# Patient Record
Sex: FEMALE | Race: WHITE | Employment: OTHER | URBAN - NONMETROPOLITAN AREA
[De-identification: names, ages, dates, MRNs, and addresses within clinical notes are randomized per-mention and may not be internally consistent; named-entity substitution may affect disease eponyms.]

---

## 2018-12-27 ENCOUNTER — PROCEDURE VISIT (OUTPATIENT)
Dept: NEUROLOGY | Age: 68
End: 2018-12-27
Payer: MEDICARE

## 2018-12-27 DIAGNOSIS — M54.89 BACK PAIN WITHOUT SCIATICA: ICD-10-CM

## 2018-12-27 DIAGNOSIS — R20.0 BILATERAL LEG NUMBNESS: Primary | ICD-10-CM

## 2018-12-27 DIAGNOSIS — G62.9 NEUROPATHY: ICD-10-CM

## 2018-12-27 PROCEDURE — 95910 NRV CNDJ TEST 7-8 STUDIES: CPT | Performed by: PSYCHIATRY & NEUROLOGY

## 2018-12-27 PROCEDURE — 95886 MUSC TEST DONE W/N TEST COMP: CPT | Performed by: PSYCHIATRY & NEUROLOGY

## 2019-03-18 ENCOUNTER — HOSPITAL ENCOUNTER (EMERGENCY)
Age: 69
Discharge: HOME | End: 2019-03-18
Attending: NURSE PRACTITIONER
Payer: MEDICARE

## 2019-03-18 VITALS
DIASTOLIC BLOOD PRESSURE: 97 MMHG | HEIGHT: 64 IN | RESPIRATION RATE: 20 BRPM | BODY MASS INDEX: 24.41 KG/M2 | TEMPERATURE: 99.4 F | OXYGEN SATURATION: 97 % | HEART RATE: 114 BPM | WEIGHT: 143 LBS | SYSTOLIC BLOOD PRESSURE: 168 MMHG

## 2019-03-18 DIAGNOSIS — J20.9 ACUTE BRONCHITIS, UNSPECIFIED ORGANISM: Primary | ICD-10-CM

## 2019-03-18 PROCEDURE — 99212 OFFICE O/P EST SF 10 MIN: CPT

## 2019-03-18 PROCEDURE — 99202 OFFICE O/P NEW SF 15 MIN: CPT | Performed by: NURSE PRACTITIONER

## 2019-03-18 RX ORDER — PREDNISONE 20 MG/1
20 TABLET ORAL 2 TIMES DAILY
Qty: 10 TABLET | Refills: 0 | Status: SHIPPED | OUTPATIENT
Start: 2019-03-18 | End: 2019-03-23

## 2019-03-18 RX ORDER — GLIMEPIRIDE 2 MG/1
2 TABLET ORAL 2 TIMES DAILY
COMMUNITY
End: 2019-10-21 | Stop reason: SDUPTHER

## 2019-03-18 RX ORDER — CEFDINIR 300 MG/1
300 CAPSULE ORAL 2 TIMES DAILY
Qty: 14 CAPSULE | Refills: 0 | Status: SHIPPED | OUTPATIENT
Start: 2019-03-18 | End: 2019-03-25

## 2019-03-18 RX ORDER — LISINOPRIL 20 MG/1
20 TABLET ORAL DAILY
COMMUNITY
End: 2019-10-21 | Stop reason: SDUPTHER

## 2019-03-18 RX ORDER — RANITIDINE 150 MG/1
150 CAPSULE ORAL DAILY
COMMUNITY
End: 2019-10-21 | Stop reason: ALTCHOICE

## 2019-03-18 RX ORDER — DEXTROMETHORPHAN HYDROBROMIDE AND PROMETHAZINE HYDROCHLORIDE 15; 6.25 MG/5ML; MG/5ML
5 SYRUP ORAL 4 TIMES DAILY PRN
Qty: 120 ML | Refills: 0 | Status: SHIPPED | OUTPATIENT
Start: 2019-03-18 | End: 2019-03-25

## 2019-03-18 ASSESSMENT — ENCOUNTER SYMPTOMS
NAUSEA: 0
CHEST TIGHTNESS: 0
VOMITING: 0
SHORTNESS OF BREATH: 0
DIARRHEA: 0
ABDOMINAL PAIN: 0
SINUS PAIN: 0
COUGH: 1
SORE THROAT: 0
SINUS PRESSURE: 0
WHEEZING: 0

## 2019-03-18 ASSESSMENT — PAIN DESCRIPTION - ORIENTATION: ORIENTATION: MID

## 2019-03-18 ASSESSMENT — PAIN SCALES - GENERAL: PAINLEVEL_OUTOF10: 3

## 2019-03-18 ASSESSMENT — PAIN DESCRIPTION - DESCRIPTORS: DESCRIPTORS: ACHING

## 2019-03-18 ASSESSMENT — PAIN DESCRIPTION - LOCATION: LOCATION: STERNUM

## 2019-03-18 ASSESSMENT — PAIN DESCRIPTION - ONSET: ONSET: ON-GOING

## 2019-03-18 ASSESSMENT — PAIN - FUNCTIONAL ASSESSMENT: PAIN_FUNCTIONAL_ASSESSMENT: PREVENTS OR INTERFERES WITH ALL ACTIVE AND SOME PASSIVE ACTIVITIES

## 2019-03-18 ASSESSMENT — PAIN DESCRIPTION - FREQUENCY: FREQUENCY: INTERMITTENT

## 2019-03-18 ASSESSMENT — PAIN DESCRIPTION - PAIN TYPE: TYPE: ACUTE PAIN

## 2019-03-18 ASSESSMENT — PAIN DESCRIPTION - PROGRESSION: CLINICAL_PROGRESSION: NOT CHANGED

## 2019-08-20 ENCOUNTER — OFFICE VISIT (OUTPATIENT)
Dept: PHYSICAL MEDICINE AND REHAB | Age: 69
End: 2019-08-20
Payer: MEDICARE

## 2019-08-20 VITALS
WEIGHT: 147 LBS | DIASTOLIC BLOOD PRESSURE: 70 MMHG | HEIGHT: 64 IN | SYSTOLIC BLOOD PRESSURE: 124 MMHG | HEART RATE: 72 BPM | BODY MASS INDEX: 25.1 KG/M2

## 2019-08-20 DIAGNOSIS — M54.5 CHRONIC BILATERAL LOW BACK PAIN, WITH SCIATICA PRESENCE UNSPECIFIED: ICD-10-CM

## 2019-08-20 DIAGNOSIS — M79.7 FIBROMYALGIA: ICD-10-CM

## 2019-08-20 DIAGNOSIS — G62.9 NEUROPATHY: Primary | ICD-10-CM

## 2019-08-20 DIAGNOSIS — G89.29 CHRONIC BILATERAL LOW BACK PAIN, WITH SCIATICA PRESENCE UNSPECIFIED: ICD-10-CM

## 2019-08-20 PROCEDURE — 99205 OFFICE O/P NEW HI 60 MIN: CPT | Performed by: NURSE PRACTITIONER

## 2019-08-20 RX ORDER — GABAPENTIN 800 MG/1
800 TABLET ORAL 2 TIMES DAILY
Qty: 60 TABLET | Refills: 1 | Status: SHIPPED | OUTPATIENT
Start: 2019-08-20 | End: 2019-09-24 | Stop reason: DRUGHIGH

## 2019-08-20 ASSESSMENT — ENCOUNTER SYMPTOMS
CHEST TIGHTNESS: 0
ABDOMINAL PAIN: 0
COUGH: 0
VOMITING: 0
DIARRHEA: 0
NAUSEA: 0
CONSTIPATION: 0
WHEEZING: 0
BACK PAIN: 0
EYE PAIN: 0
RHINORRHEA: 0
SINUS PRESSURE: 0
PHOTOPHOBIA: 0
SORE THROAT: 0
SHORTNESS OF BREATH: 0
COLOR CHANGE: 0

## 2019-08-20 NOTE — PROGRESS NOTES
well-developed and well-nourished. No distress. HENT:   Head: Normocephalic and atraumatic. Right Ear: External ear normal.   Left Ear: External ear normal.   Nose: Nose normal.   Mouth/Throat: Oropharynx is clear and moist. No oropharyngeal exudate. Eyes: Pupils are equal, round, and reactive to light. Conjunctivae and EOM are normal. Right eye exhibits no discharge. Left eye exhibits no discharge. No scleral icterus. Neck: Normal range of motion and full passive range of motion without pain. Neck supple. No muscular tenderness present. No neck rigidity. No edema, no erythema and normal range of motion present. No thyromegaly present. Cardiovascular: Normal rate, regular rhythm, normal heart sounds and intact distal pulses. Exam reveals no gallop and no friction rub. No murmur heard. Pulmonary/Chest: Effort normal and breath sounds normal. No respiratory distress. She has no wheezes. She has no rales. She exhibits no tenderness. Abdominal: Soft. Bowel sounds are normal. She exhibits no distension. There is no tenderness. There is no rebound and no guarding. Musculoskeletal: She exhibits tenderness. Right shoulder: She exhibits tenderness and bony tenderness. Left shoulder: She exhibits tenderness and bony tenderness. Right hip: She exhibits tenderness and bony tenderness. Left hip: She exhibits tenderness and bony tenderness. Right knee: She exhibits bony tenderness. Tenderness found. Left knee: Tenderness found. Right ankle: Tenderness. Left ankle: Tenderness. Cervical back: She exhibits tenderness and bony tenderness. Thoracic back: She exhibits tenderness and bony tenderness. Lumbar back: She exhibits tenderness and bony tenderness. Right lower leg: She exhibits tenderness. Left lower leg: She exhibits tenderness. Right foot: There is tenderness. Left foot: There is tenderness.

## 2019-09-24 ENCOUNTER — OFFICE VISIT (OUTPATIENT)
Dept: PHYSICAL MEDICINE AND REHAB | Age: 69
End: 2019-09-24
Payer: MEDICARE

## 2019-09-24 VITALS
HEIGHT: 64 IN | DIASTOLIC BLOOD PRESSURE: 66 MMHG | SYSTOLIC BLOOD PRESSURE: 118 MMHG | WEIGHT: 145.5 LBS | HEART RATE: 68 BPM | BODY MASS INDEX: 24.84 KG/M2

## 2019-09-24 DIAGNOSIS — G62.9 NEUROPATHY: ICD-10-CM

## 2019-09-24 DIAGNOSIS — M79.7 FIBROMYALGIA: ICD-10-CM

## 2019-09-24 DIAGNOSIS — M54.5 CHRONIC BILATERAL LOW BACK PAIN, WITH SCIATICA PRESENCE UNSPECIFIED: ICD-10-CM

## 2019-09-24 DIAGNOSIS — G62.9 NEUROPATHY: Primary | ICD-10-CM

## 2019-09-24 DIAGNOSIS — G89.29 CHRONIC BILATERAL LOW BACK PAIN, WITH SCIATICA PRESENCE UNSPECIFIED: ICD-10-CM

## 2019-09-24 PROCEDURE — 99213 OFFICE O/P EST LOW 20 MIN: CPT | Performed by: NURSE PRACTITIONER

## 2019-09-24 ASSESSMENT — ENCOUNTER SYMPTOMS
NAUSEA: 0
SORE THROAT: 0
SINUS PRESSURE: 0
CONSTIPATION: 0
BACK PAIN: 1
VOMITING: 0
ABDOMINAL PAIN: 0
WHEEZING: 0
COUGH: 0
CHEST TIGHTNESS: 0
DIARRHEA: 0
SHORTNESS OF BREATH: 0
EYE PAIN: 0
PHOTOPHOBIA: 0
COLOR CHANGE: 0
RHINORRHEA: 0

## 2019-09-24 NOTE — PROGRESS NOTES
FINDINGS:     Normal lumbar lordosis.  There is no substantial scoliosis. Feliz Simmonds is a     normal alignment of the vertebrae.          There is multilevel endplate spondylosis of the lumbar vertebrae.  Normal     disc space heights.  There is no demonstrated fracture.  Mild facet     arthropathy at L4-L5 and L5-S1.          There is atherosclerotic calcification of the abdominal aorta without a     demonstrated aneurysm.     ___________________________________          IMPRESSION:     Mild spondylosis.  Mild facet arthropathy.          EMG did not show Lumbar radiculopathy      She denies any ER visits since last visit. Pain scale with out pain medications or at its worst is 8/10. Pain scale with pain medications or at its best is 3/10. LAST UDS   8/20/2019  + THC she uses medical marijuana. She uses a vape pin every hs and brownies couple times a month. The patientis allergic to codeine and sulfa antibiotics. Past Medical History  Diana Silva  has a past medical history of Anxiety, Arthritis, Fibromyalgia, GERD (gastroesophageal reflux disease), GERD (gastroesophageal reflux disease), Hyperlipidemia, Hypertension, Hypothyroidism, Neuropathy, Osteoporosis, Type 2 diabetes mellitus without complication ([address] and Type II or unspecified type diabetes mellitus without mention of complication, not stated as uncontrolled. Past Surgical History  The patient  has a past surgical history that includes Hysterectomy; cyst removal; Cholecystectomy; Appendectomy; bladder suspension; Colonoscopy (2010, 2013); Tubal ligation; Carpal tunnel release (Right); Bladder surgery; Upper gastrointestinal endoscopy (4215,4682); and Upper gastrointestinal endoscopy.     Family History  This patient's family history includes Arthritis in her mother; Cancer (age of onset: 67) in her sister; Diabetes in her paternal aunt; Esophageal Cancer (age of onset: 61) in her brother; Esophageal Cancer (age of onset: 72) in her brother; pallor. Psychiatric: She has a normal mood and affect. Her behavior is normal. Judgment and thought content normal. Her mood appears not anxious. Her affect is not angry, not blunt, not labile and not inappropriate. Her speech is not rapid and/or pressured, not delayed, not tangential and not slurred. She is not agitated, not aggressive, not hyperactive, not slowed, not withdrawn, not actively hallucinating and not combative. Thought content is not paranoid and not delusional. Cognition and memory are not impaired. She does not express impulsivity or inappropriate judgment. She does not exhibit a depressed mood. She expresses no homicidal and no suicidal ideation. She expresses no suicidal plans and no homicidal plans. She is communicative. She exhibits normal recent memory and normal remote memory. She is attentive. Nursing note and vitals reviewed. ROSA test-  Ye[place]ans test:-  [place] test: -     Assessment:     1. Neuropathy    2. Fibromyalgia    3. Chronic bilateral low back pain, with sciatica presence unspecified            Plan:      · OARRS reviewed. Current MED: 6  · Patient was not offered naloxone for home. · Discussed long term side effects of medications, tolerance, dependency and addiction. · Previous UDS reviewed  · UDS preformed today for compliance. · Patient told can not receive any pain medications from any other source. · No evidence of abuse, diversion or aberrant behavior.  Medications and/or procedures to improve function and quality of life- patient understanding with this and that may not be pain free   Discussed with patient about safe storage of medications at home   Discussed possible weaning of medication dosing dependent on treatment/procedure results.     Testing: reviewed EMG and Lumbar XR, refuses to go to PT for Lumbar MRI to see if has spinal stenosis   Procedures: none   Discussed with patient about risks with procedure including infection, reaction to

## 2019-09-30 ENCOUNTER — TELEPHONE (OUTPATIENT)
Dept: PHYSICAL MEDICINE AND REHAB | Age: 69
End: 2019-09-30

## 2019-10-21 ENCOUNTER — OFFICE VISIT (OUTPATIENT)
Dept: FAMILY MEDICINE CLINIC | Age: 69
End: 2019-10-21
Payer: MEDICARE

## 2019-10-21 VITALS
WEIGHT: 144 LBS | SYSTOLIC BLOOD PRESSURE: 118 MMHG | HEIGHT: 64 IN | HEART RATE: 64 BPM | DIASTOLIC BLOOD PRESSURE: 72 MMHG | BODY MASS INDEX: 24.59 KG/M2

## 2019-10-21 DIAGNOSIS — B96.89 ACUTE BACTERIAL SINUSITIS: ICD-10-CM

## 2019-10-21 DIAGNOSIS — I10 ESSENTIAL HYPERTENSION: ICD-10-CM

## 2019-10-21 DIAGNOSIS — E03.9 ACQUIRED HYPOTHYROIDISM: ICD-10-CM

## 2019-10-21 DIAGNOSIS — J01.90 ACUTE BACTERIAL SINUSITIS: ICD-10-CM

## 2019-10-21 DIAGNOSIS — E11.42 TYPE 2 DIABETES MELLITUS WITH DIABETIC POLYNEUROPATHY, WITHOUT LONG-TERM CURRENT USE OF INSULIN (HCC): Primary | Chronic | ICD-10-CM

## 2019-10-21 DIAGNOSIS — Z12.31 ENCOUNTER FOR SCREENING MAMMOGRAM FOR BREAST CANCER: ICD-10-CM

## 2019-10-21 DIAGNOSIS — E55.9 VITAMIN D DEFICIENCY: ICD-10-CM

## 2019-10-21 LAB — HBA1C MFR BLD: 6.5 %

## 2019-10-21 PROCEDURE — 99214 OFFICE O/P EST MOD 30 MIN: CPT | Performed by: FAMILY MEDICINE

## 2019-10-21 PROCEDURE — 83036 HEMOGLOBIN GLYCOSYLATED A1C: CPT | Performed by: FAMILY MEDICINE

## 2019-10-21 RX ORDER — ERGOCALCIFEROL 1.25 MG/1
50000 CAPSULE ORAL WEEKLY
Qty: 16 CAPSULE | Refills: 1 | Status: SHIPPED | OUTPATIENT
Start: 2019-10-21 | End: 2020-12-30

## 2019-10-21 RX ORDER — METOPROLOL SUCCINATE 50 MG/1
50 TABLET, EXTENDED RELEASE ORAL DAILY
COMMUNITY
End: 2019-10-21 | Stop reason: SDUPTHER

## 2019-10-21 RX ORDER — LISINOPRIL 20 MG/1
20 TABLET ORAL DAILY
Qty: 90 TABLET | Refills: 1 | Status: SHIPPED | OUTPATIENT
Start: 2019-10-21 | End: 2020-05-19 | Stop reason: SDUPTHER

## 2019-10-21 RX ORDER — ERGOCALCIFEROL 1.25 MG/1
50000 CAPSULE ORAL WEEKLY
COMMUNITY
End: 2019-10-21 | Stop reason: SDUPTHER

## 2019-10-21 RX ORDER — LEVOTHYROXINE SODIUM 0.05 MG/1
50 TABLET ORAL DAILY
Qty: 90 TABLET | Refills: 1 | Status: SHIPPED | OUTPATIENT
Start: 2019-10-21 | End: 2020-05-19 | Stop reason: SDUPTHER

## 2019-10-21 RX ORDER — GLIMEPIRIDE 2 MG/1
2 TABLET ORAL 2 TIMES DAILY
Qty: 180 TABLET | Refills: 1 | Status: SHIPPED | OUTPATIENT
Start: 2019-10-21 | End: 2020-04-20 | Stop reason: SDUPTHER

## 2019-10-21 RX ORDER — CLONAZEPAM 1 MG/1
1 TABLET ORAL 3 TIMES DAILY PRN
Qty: 270 TABLET | Refills: 1 | Status: CANCELLED | OUTPATIENT
Start: 2019-10-21 | End: 2020-01-21

## 2019-10-21 RX ORDER — GABAPENTIN 600 MG/1
600 TABLET ORAL 4 TIMES DAILY
Qty: 360 TABLET | Refills: 1 | Status: SHIPPED | OUTPATIENT
Start: 2019-10-21 | End: 2020-05-19 | Stop reason: SDUPTHER

## 2019-10-21 RX ORDER — AMOXICILLIN AND CLAVULANATE POTASSIUM 875; 125 MG/1; MG/1
1 TABLET, FILM COATED ORAL 2 TIMES DAILY
Qty: 20 TABLET | Refills: 0 | Status: SHIPPED | OUTPATIENT
Start: 2019-10-21 | End: 2019-10-31

## 2019-10-21 RX ORDER — GABAPENTIN 600 MG/1
600 TABLET ORAL 4 TIMES DAILY
COMMUNITY
End: 2019-10-21 | Stop reason: SDUPTHER

## 2019-10-21 RX ORDER — METOPROLOL SUCCINATE 50 MG/1
50 TABLET, EXTENDED RELEASE ORAL DAILY
Qty: 90 TABLET | Refills: 1 | Status: SHIPPED | OUTPATIENT
Start: 2019-10-21 | End: 2020-04-07 | Stop reason: SDUPTHER

## 2019-10-21 ASSESSMENT — PATIENT HEALTH QUESTIONNAIRE - PHQ9
SUM OF ALL RESPONSES TO PHQ9 QUESTIONS 1 & 2: 0
2. FEELING DOWN, DEPRESSED OR HOPELESS: 0
1. LITTLE INTEREST OR PLEASURE IN DOING THINGS: 0
SUM OF ALL RESPONSES TO PHQ QUESTIONS 1-9: 0
SUM OF ALL RESPONSES TO PHQ QUESTIONS 1-9: 0

## 2019-10-21 ASSESSMENT — ENCOUNTER SYMPTOMS
NAUSEA: 0
COUGH: 1
SHORTNESS OF BREATH: 0
VOMITING: 0
EYE REDNESS: 0
EYE DISCHARGE: 0
RHINORRHEA: 1
SORE THROAT: 1

## 2019-11-07 ENCOUNTER — TELEPHONE (OUTPATIENT)
Dept: FAMILY MEDICINE CLINIC | Age: 69
End: 2019-11-07

## 2019-11-07 LAB
A/G RATIO: 1.5 (ref 1.5–2.5)
ALBUMIN SERPL-MCNC: 4.1 GM/DL (ref 3.5–5)
ALP BLD-CCNC: 39 IU/L (ref 39–118)
ALT SERPL-CCNC: 11 IU/L (ref 10–40)
ANION GAP SERPL CALCULATED.3IONS-SCNC: 9 MMOL/L (ref 4–12)
AST SERPL-CCNC: 14 IU/L (ref 15–41)
BILIRUB SERPL-MCNC: 0.3 MG/DL (ref 0.2–1)
BUN BLDV-MCNC: 19 MG/DL (ref 7–20)
CALCIUM SERPL-MCNC: 9.4 MG/DL (ref 8.8–10.5)
CHLORIDE BLD-SCNC: 104 MEQ/L (ref 101–111)
CHOLESTEROL/HDL RELATIVE RISK: 4.8 (ref 4–4.4)
CHOLESTEROL: 184 MG/DL
CO2: 28 MEQ/L (ref 21–32)
CREAT SERPL-MCNC: 1.19 MG/DL (ref 0.6–1.3)
CREATININE CLEARANCE: 45
CREATININE, RANDOM URINE: 380.1 MG/DL
DIRECT-LDL / HDL RISK: 3.1
GLUCOSE: 97 MG/DL (ref 70–110)
HDLC SERPL-MCNC: 38 MG/DL
LDL CHOLESTEROL DIRECT: 121 MG/DL
MICROALBUMIN UR-MCNC: 19.1 MG/L
MICROALBUMIN/CREAT UR-RTO: 5 MG/GM (ref 0–30)
POTASSIUM SERPL-SCNC: 4.9 MEQ/L (ref 3.6–5)
SODIUM BLD-SCNC: 141 MEQ/L (ref 135–145)
TOTAL PROTEIN: 6.9 G/DL (ref 6.2–8)
TRIGL SERPL-MCNC: 262 MG/DL
TSH SERPL DL<=0.05 MIU/L-ACNC: 1.58 MCIU/ML (ref 0.49–4.67)
VITAMIN D 25-HYDROXY: 52.5 NG/ML (ref 30–100)
VLDLC SERPL CALC-MCNC: 52 MG/DL

## 2019-12-03 DIAGNOSIS — F41.9 ANXIETY: Primary | ICD-10-CM

## 2019-12-03 RX ORDER — CLONAZEPAM 1 MG/1
1 TABLET ORAL 3 TIMES DAILY PRN
Qty: 270 TABLET | Refills: 0 | Status: SHIPPED | OUTPATIENT
Start: 2019-12-03 | End: 2020-04-07 | Stop reason: SDUPTHER

## 2019-12-13 RX ORDER — GABAPENTIN 800 MG/1
800 TABLET ORAL 2 TIMES DAILY
Qty: 60 TABLET | Refills: 0 | OUTPATIENT
Start: 2019-12-13 | End: 2020-01-12

## 2020-03-18 RX ORDER — SITAGLIPTIN AND METFORMIN HYDROCHLORIDE 1000; 50 MG/1; MG/1
1 TABLET, FILM COATED ORAL 2 TIMES DAILY WITH MEALS
Qty: 180 TABLET | Refills: 1 | Status: SHIPPED | OUTPATIENT
Start: 2020-03-18 | End: 2020-05-19 | Stop reason: SDUPTHER

## 2020-03-18 RX ORDER — LANSOPRAZOLE 30 MG/1
30 CAPSULE, DELAYED RELEASE ORAL DAILY
Qty: 90 CAPSULE | Refills: 1 | Status: SHIPPED | OUTPATIENT
Start: 2020-03-18 | End: 2020-05-19 | Stop reason: SDUPTHER

## 2020-04-01 ENCOUNTER — TELEPHONE (OUTPATIENT)
Dept: FAMILY MEDICINE CLINIC | Age: 70
End: 2020-04-01

## 2020-04-01 NOTE — TELEPHONE ENCOUNTER
----- Message from Anh Valerio MD sent at 3/31/2020  4:42 PM EDT -----  Please contact patient to set up Medicare AWV via [employer]. me for her and her  Tunde Mark on 4/1/2020 if possible.

## 2020-04-07 RX ORDER — METOPROLOL SUCCINATE 50 MG/1
50 TABLET, EXTENDED RELEASE ORAL DAILY
Qty: 90 TABLET | Refills: 1 | Status: SHIPPED | OUTPATIENT
Start: 2020-04-07 | End: 2020-05-19 | Stop reason: SDUPTHER

## 2020-04-07 RX ORDER — CLONAZEPAM 1 MG/1
1 TABLET ORAL 3 TIMES DAILY PRN
Qty: 270 TABLET | Refills: 0 | Status: SHIPPED | OUTPATIENT
Start: 2020-04-07 | End: 2020-05-19 | Stop reason: SDUPTHER

## 2020-04-07 NOTE — TELEPHONE ENCOUNTER
Steven Heron called requesting a refill on the following medications:  Requested Prescriptions     Pending Prescriptions Disp Refills    clonazePAM (KLONOPIN) 1 MG tablet 270 tablet 0     Sig: Take 1 tablet by mouth 3 times daily as needed for Anxiety for up to 90 days.     metoprolol succinate (TOPROL XL) 50 MG extended release tablet 90 tablet 1     Sig: Take 1 tablet by mouth daily     Pharmacy verified:  [facility]      Date of last visit: 10-21-19  Date of next visit (if applicable): 5/24/2674 (rs from 04-20)

## 2020-04-20 RX ORDER — GLIMEPIRIDE 2 MG/1
2 TABLET ORAL 2 TIMES DAILY
Qty: 180 TABLET | Refills: 1 | Status: SHIPPED | OUTPATIENT
Start: 2020-04-20 | End: 2020-05-19 | Stop reason: SDUPTHER

## 2020-05-18 ENCOUNTER — NURSE TRIAGE (OUTPATIENT)
Dept: OTHER | Facility: CLINIC | Age: 70
End: 2020-05-18

## 2020-05-18 NOTE — PROGRESS NOTES
Palpations: Abdomen is soft. Tenderness: There is no abdominal tenderness. Skin:     General: Skin is warm and dry. Findings: No erythema or rash. Neurological:      Mental Status: She is alert and oriented to person, place, and time. Psychiatric:         Behavior: Behavior normal.       Assessment/Plan:     Xu Ríos was seen today for medicare awv, generalized body aches and cough. Diagnoses and all orders for this visit:    Generalized body aches  -     COVID-19 Ambulatory; Future  -     predniSONE (DELTASONE) 20 MG tablet; Take 2 tablets by mouth daily    Dry cough  -     COVID-19 Ambulatory; Future  -     predniSONE (DELTASONE) 20 MG tablet; Take 2 tablets by mouth daily    Type 2 diabetes mellitus with diabetic polyneuropathy, without long-term current use of insulin (HCC)  -     gabapentin (NEURONTIN) 600 MG tablet; Take 1 tablet by mouth 4 times daily for 152 days. One tablet in AM, one tablet at lunch time, 2 tablets at bedtime  -     glimepiride (AMARYL) 2 MG tablet; Take 1 tablet by mouth 2 times daily    Acquired hypothyroidism  -     levothyroxine (SYNTHROID) 50 MCG tablet; Take 1 tablet by mouth Daily    Essential hypertension  -     Blood pressure is controlled so will continue on current medication.   -     lisinopril (PRINIVIL;ZESTRIL) 20 MG tablet; Take 1 tablet by mouth daily  -     metoprolol succinate (TOPROL XL) 50 MG extended release tablet; Take 1 tablet by mouth daily    Anxiety/Tremors        -     Controlled substances monitoring: possible medication side effects, risk of tolerance and/or dependence, and alternative treatments discussed, no signs of potential drug abuse or diversion identified and OARRS report reviewed today- activity consistent with treatment plan. -     clonazePAM (KLONOPIN) 1 MG tablet; Take 1 tablet by mouth 3 times daily as needed for Anxiety for up to 90 days.     Return if symptoms worsen or fail to improve, for Medicare Annual Wellness Visit in 1 year.    Electronically signed by Cammie Martinez MD on 5/19/2020 at 12:34 PM

## 2020-05-18 NOTE — PROGRESS NOTES
[address] [place] [address] [address], [address], [place]  Phone:  [phone]  Fax:  [phone]        Medicare Annual Wellness Visit    Name: Emilia Davis Date: 2020   MRN: [id number] Sex: Female   Age: 79 y.o. Ethnicity: Non-/Non    : 1950 Race: Monique Trujillo is here for Medicare AWV; Generalized Body Aches (after doing yardwork); and Cough (dry, worse at night )    Screenings for behavioral, psychosocial and functional/safety risks, and cognitive dysfunction are all negative except as indicated below. These results, as well as other patient data from the [address] form, are documented in Flowsheets linked to this Encounter. Allergies   Allergen Reactions    Codeine     Sulfa Antibiotics        Prior to Visit Medications    Medication Sig Taking? Authorizing Provider   glimepiride (AMARYL) 2 MG tablet Take 1 tablet by mouth 2 times daily Yes Nils Clifford MD   clonazePAM (KLONOPIN) 1 MG tablet Take 1 tablet by mouth 3 times daily as needed for Anxiety for up to 90 days. Yes Nils Clifford MD   metoprolol succinate (TOPROL XL) 50 MG extended release tablet Take 1 tablet by mouth daily Yes Nils Clifford MD   sitaGLIPtan-metformin (JANUMET)  MG per tablet Take 1 tablet by mouth 2 times daily (with meals) Yes Nils Clifford MD   lansoprazole (PREVACID) 30 MG delayed release capsule Take 1 capsule by mouth daily Yes Nils Clifford MD   lisinopril (PRINIVIL;ZESTRIL) 20 MG tablet Take 1 tablet by mouth daily Yes Nils Clifford MD   levothyroxine (SYNTHROID) 50 MCG tablet Take 1 tablet by mouth Daily Yes Nils Clifford MD   vitamin D (ERGOCALCIFEROL) 60396 units CAPS capsule Take 1 capsule by mouth once a week  Patient not taking: Reported on 2020  Nils Clifford MD   gabapentin (NEURONTIN) 600 MG tablet Take 1 tablet by mouth 4 times daily for 152 days.  One tablet in AM, one today and where indicated follow up appointments were made and/or referrals ordered. Positive Risk Factor Screenings with Interventions:     Fall Risk:  Timed Up and Go Test > 12 seconds? (Complete if either Fall Risk answers are Yes): no  2 or more falls in past year?: (!) yes  Fall with injury in past year?: no  Fall Risk Interventions:    · Home safety tips provided    Personalized Preventive Plan   Current Health Maintenance Status    There is no immunization history on file for this patient. Health Maintenance   Topic Date Due    Hepatitis C screen  1950    DTaP/Tdap/Td vaccine (1 - Tdap) 03/20/1969    Shingles Vaccine (1 of 2) 03/20/2000    DEXA (modify frequency per FRAX score)  03/20/2005    Pneumococcal 65+ years Vaccine (1 of 1 - PPSV23) 03/20/2015    Diabetic foot exam  07/15/2016    Annual Wellness Visit (AWV)  06/23/2019    Flu vaccine (Season Ended) 09/01/2020    A1C test (Diabetic or Prediabetic)  10/21/2020    Diabetic retinal exam  10/23/2020    Diabetic microalbuminuria test  11/07/2020    Lipid screen  11/07/2020    Potassium monitoring  11/07/2020    Creatinine monitoring  11/07/2020    Breast cancer screen  11/07/2021    Colon cancer screen colonoscopy  04/30/2025    Hepatitis A vaccine  Aged Out    Hib vaccine  Aged Out    Meningococcal (ACWY) vaccine  Aged Out     Recommendations for [place] Due: see orders and patient instructions/AVS.    Recommended screening schedule for the next 5-10 years is provided to the patient in written form: see Patient Instructions/AVS.    Steffani Goltz was seen today for medicare awv, generalized body aches and cough. Diagnoses and all orders for this visit:    Encounter for Medicare annual wellness exam        -     Routine health maintenance screening was reviewed as above. Electronically signed by Mary Anne King MD on 5/19/2020.

## 2020-05-19 ENCOUNTER — HOSPITAL ENCOUNTER (OUTPATIENT)
Age: 70
Discharge: HOME | End: 2020-05-19
Payer: MEDICARE

## 2020-05-19 ENCOUNTER — OFFICE VISIT (OUTPATIENT)
Dept: FAMILY MEDICINE CLINIC | Age: 70
End: 2020-05-19
Payer: MEDICARE

## 2020-05-19 VITALS
RESPIRATION RATE: 14 BRPM | OXYGEN SATURATION: 98 % | TEMPERATURE: 96.7 F | HEART RATE: 68 BPM | HEIGHT: 64 IN | SYSTOLIC BLOOD PRESSURE: 122 MMHG | BODY MASS INDEX: 24.48 KG/M2 | WEIGHT: 143.4 LBS | DIASTOLIC BLOOD PRESSURE: 72 MMHG

## 2020-05-19 PROCEDURE — G0444 DEPRESSION SCREEN ANNUAL: HCPCS | Performed by: FAMILY MEDICINE

## 2020-05-19 PROCEDURE — U0003 INFECTIOUS AGENT DETECTION BY NUCLEIC ACID (DNA OR RNA); SEVERE ACUTE RESPIRATORY SYNDROME CORONAVIRUS 2 (SARS-COV-2) (CORONAVIRUS DISEASE [COVID-19]), AMPLIFIED PROBE TECHNIQUE, MAKING USE OF HIGH THROUGHPUT TECHNOLOGIES AS DESCRIBED BY CMS-2020-01-R: HCPCS

## 2020-05-19 PROCEDURE — G0439 PPPS, SUBSEQ VISIT: HCPCS | Performed by: FAMILY MEDICINE

## 2020-05-19 PROCEDURE — 99213 OFFICE O/P EST LOW 20 MIN: CPT | Performed by: FAMILY MEDICINE

## 2020-05-19 PROCEDURE — 99211 OFF/OP EST MAY X REQ PHY/QHP: CPT

## 2020-05-19 PROCEDURE — U0002 COVID-19 LAB TEST NON-CDC: HCPCS

## 2020-05-19 RX ORDER — GABAPENTIN 600 MG/1
600 TABLET ORAL 4 TIMES DAILY
Qty: 360 TABLET | Refills: 1 | Status: SHIPPED | OUTPATIENT
Start: 2020-05-19 | End: 2020-12-30

## 2020-05-19 RX ORDER — GLIMEPIRIDE 2 MG/1
2 TABLET ORAL 2 TIMES DAILY
Qty: 180 TABLET | Refills: 1 | Status: SHIPPED | OUTPATIENT
Start: 2020-05-19 | End: 2020-12-30 | Stop reason: ALTCHOICE

## 2020-05-19 RX ORDER — SITAGLIPTIN AND METFORMIN HYDROCHLORIDE 1000; 50 MG/1; MG/1
1 TABLET, FILM COATED ORAL 2 TIMES DAILY WITH MEALS
Qty: 180 TABLET | Refills: 1 | Status: SHIPPED | OUTPATIENT
Start: 2020-05-19 | End: 2020-12-07

## 2020-05-19 RX ORDER — CLONAZEPAM 1 MG/1
1 TABLET ORAL 3 TIMES DAILY PRN
Qty: 270 TABLET | Refills: 0 | Status: SHIPPED | OUTPATIENT
Start: 2020-05-19 | End: 2021-01-06 | Stop reason: SDUPTHER

## 2020-05-19 RX ORDER — METOPROLOL SUCCINATE 50 MG/1
50 TABLET, EXTENDED RELEASE ORAL DAILY
Qty: 90 TABLET | Refills: 1 | Status: SHIPPED | OUTPATIENT
Start: 2020-05-19 | End: 2020-12-17 | Stop reason: SDUPTHER

## 2020-05-19 RX ORDER — LEVOTHYROXINE SODIUM 0.05 MG/1
50 TABLET ORAL DAILY
Qty: 90 TABLET | Refills: 1 | Status: SHIPPED | OUTPATIENT
Start: 2020-05-19 | End: 2020-12-15

## 2020-05-19 RX ORDER — PREDNISONE 20 MG/1
40 TABLET ORAL DAILY
Qty: 10 TABLET | Refills: 0 | Status: SHIPPED | OUTPATIENT
Start: 2020-05-19 | End: 2020-12-30

## 2020-05-19 RX ORDER — LISINOPRIL 20 MG/1
20 TABLET ORAL DAILY
Qty: 90 TABLET | Refills: 1 | Status: SHIPPED | OUTPATIENT
Start: 2020-05-19 | End: 2020-12-17 | Stop reason: SDUPTHER

## 2020-05-19 RX ORDER — LANSOPRAZOLE 30 MG/1
30 CAPSULE, DELAYED RELEASE ORAL DAILY
Qty: 90 CAPSULE | Refills: 1 | Status: SHIPPED | OUTPATIENT
Start: 2020-05-19 | End: 2021-04-12 | Stop reason: SDUPTHER

## 2020-05-19 ASSESSMENT — ENCOUNTER SYMPTOMS
NAUSEA: 0
WHEEZING: 0
EYE DISCHARGE: 0
RHINORRHEA: 0
SHORTNESS OF BREATH: 1
CHEST TIGHTNESS: 0
VOMITING: 0
COUGH: 1
EYE REDNESS: 0
COLOR CHANGE: 0

## 2020-05-19 ASSESSMENT — LIFESTYLE VARIABLES
HOW OFTEN DURING THE LAST YEAR HAVE YOU FAILED TO DO WHAT WAS NORMALLY EXPECTED FROM YOU BECAUSE OF DRINKING: 0
HOW OFTEN DURING THE LAST YEAR HAVE YOU FOUND THAT YOU WERE NOT ABLE TO STOP DRINKING ONCE YOU HAD STARTED: 0
HAVE YOU OR SOMEONE ELSE BEEN INJURED AS A RESULT OF YOUR DRINKING: 0
HOW MANY STANDARD DRINKS CONTAINING ALCOHOL DO YOU HAVE ON A TYPICAL DAY: 0
HAS A RELATIVE, FRIEND, DOCTOR, OR ANOTHER HEALTH PROFESSIONAL EXPRESSED CONCERN ABOUT YOUR DRINKING OR SUGGESTED YOU CUT DOWN: 0
HOW OFTEN DO YOU HAVE A DRINK CONTAINING ALCOHOL: 1
HOW OFTEN DURING THE LAST YEAR HAVE YOU NEEDED AN ALCOHOLIC DRINK FIRST THING IN THE MORNING TO GET YOURSELF GOING AFTER A NIGHT OF HEAVY DRINKING: 0
AUDIT TOTAL SCORE: 1
HOW OFTEN DO YOU HAVE SIX OR MORE DRINKS ON ONE OCCASION: 0
HOW OFTEN DURING THE LAST YEAR HAVE YOU HAD A FEELING OF GUILT OR REMORSE AFTER DRINKING: 0
AUDIT-C TOTAL SCORE: 1
HOW OFTEN DURING THE LAST YEAR HAVE YOU BEEN UNABLE TO REMEMBER WHAT HAPPENED THE NIGHT BEFORE BECAUSE YOU HAD BEEN DRINKING: 0

## 2020-05-19 ASSESSMENT — PATIENT HEALTH QUESTIONNAIRE - PHQ9: SUM OF ALL RESPONSES TO PHQ QUESTIONS 1-9: 10

## 2020-05-19 NOTE — PROGRESS NOTES
Oropharyngeal covid test obtained without difficulty.;  Pt tolerated well and released without complaints.

## 2020-05-19 NOTE — PATIENT INSTRUCTIONS
Personalized Preventive Plan for Farhan Harvey - 5/19/2020  Medicare offers a range of preventive health benefits. Some of the tests and screenings are paid in full while other may be subject to a deductible, co-insurance, and/or copay. Some of these benefits include a comprehensive review of your medical history including lifestyle, illnesses that may run in your family, and various assessments and screenings as appropriate. After reviewing your medical record and screening and assessments performed today your provider may have ordered immunizations, labs, imaging, and/or referrals for you. A list of these orders (if applicable) as well as your Preventive Care list are included within your After Visit Summary for your review. Other Preventive Recommendations:    · A preventive eye exam performed by an eye specialist is recommended every 1-2 years to screen for glaucoma; cataracts, macular degeneration, and other eye disorders. · A preventive dental visit is recommended every 6 months. · Try to get at least 150 minutes of exercise per week or 10,000 steps per day on a pedometer . · Order or download the FREE \"Exercise & Physical Activity: Your Everyday Guide\" from [employer]. Call 7-572.878.7986 or search [employer] online. · You need 5863-1977 mg of calcium and 3814-8694 IU of vitamin D per day. It is possible to meet your calcium requirement with diet alone, but a vitamin D supplement is usually necessary to meet this goal.  · When exposed to the sun, use a sunscreen that protects against both UVA and UVB radiation with an SPF of 30 or greater. Reapply every 2 to 3 hours or after sweating, drying off with a towel, or swimming. · Always wear a seat belt when traveling in a car. Always wear a helmet when riding a bicycle or motorcycle.

## 2020-05-21 LAB — SARS-COV-2: NOT DETECTED

## 2020-05-22 ENCOUNTER — TELEPHONE (OUTPATIENT)
Dept: FAMILY MEDICINE CLINIC | Age: 70
End: 2020-05-22

## 2020-11-03 ENCOUNTER — NURSE ONLY (OUTPATIENT)
Dept: FAMILY MEDICINE CLINIC | Age: 70
End: 2020-11-03
Payer: MEDICARE

## 2020-11-03 PROCEDURE — 90694 VACC AIIV4 NO PRSRV 0.5ML IM: CPT | Performed by: FAMILY MEDICINE

## 2020-11-03 PROCEDURE — G0008 ADMIN INFLUENZA VIRUS VAC: HCPCS | Performed by: FAMILY MEDICINE

## 2020-12-07 RX ORDER — SITAGLIPTIN AND METFORMIN HYDROCHLORIDE 1000; 50 MG/1; MG/1
1 TABLET, FILM COATED ORAL 2 TIMES DAILY WITH MEALS
Qty: 60 TABLET | Refills: 0 | Status: SHIPPED | OUTPATIENT
Start: 2020-12-07 | Stop reason: SDUPTHER

## 2020-12-07 NOTE — TELEPHONE ENCOUNTER
Yan Cruz called requesting a refill on the following medications:  Requested Prescriptions     Pending Prescriptions Disp Refills    JANUMET  MG per tablet [Pharmacy Med Name: JANUMET 50-1,000 MG TABLET] 60 tablet 0     Sig: Take 1 tablet by mouth 2 times daily (with meals)       Date of last visit: 5/19/2020  Date of next visit (if applicable):N/A  Date of last refill: 05/19/2020  [facility]

## 2020-12-14 NOTE — TELEPHONE ENCOUNTER
Vaughn  called requesting a refill on the following medications:  Requested Prescriptions     Pending Prescriptions Disp Refills    levothyroxine (SYNTHROID) 50 MCG tablet [Pharmacy Med Name: LEVOTHYROXINE 50 MCG TABLET] 90 tablet 0     Sig: Take 1 tablet by mouth Daily       Date of last visit: 5/19/2020  Date of next visit (if applicable):Visit date not found  Date of last refill: 05/19/2020  Pharmacy Name: [facility] Thanks, Dola Lesch, LPN

## 2020-12-15 RX ORDER — LEVOTHYROXINE SODIUM 0.05 MG/1
50 TABLET ORAL DAILY
Qty: 90 TABLET | Refills: 0 | Status: SHIPPED | OUTPATIENT
Start: 2020-12-15 | End: 2021-03-19

## 2020-12-17 RX ORDER — LISINOPRIL 10 MG/1
1 TABLET ORAL DAILY
COMMUNITY
Start: 2020-11-23 | End: 2020-12-17 | Stop reason: SDUPTHER

## 2020-12-17 RX ORDER — METOPROLOL SUCCINATE 50 MG/1
50 TABLET, EXTENDED RELEASE ORAL DAILY
Qty: 30 TABLET | Refills: 0 | Status: SHIPPED | OUTPATIENT
Start: 2020-12-17 | End: 2021-01-06 | Stop reason: SDUPTHER

## 2020-12-17 RX ORDER — METOPROLOL SUCCINATE 25 MG/1
1 TABLET, EXTENDED RELEASE ORAL DAILY
COMMUNITY
Start: 2020-11-11 | End: 2020-12-17 | Stop reason: SDUPTHER

## 2020-12-17 RX ORDER — LISINOPRIL 20 MG/1
20 TABLET ORAL DAILY
Qty: 30 TABLET | Refills: 0 | Status: SHIPPED | OUTPATIENT
Start: 2020-12-17 | End: 2021-01-06 | Stop reason: SDUPTHER

## 2020-12-17 RX ORDER — AMIODARONE HYDROCHLORIDE 200 MG/1
1 TABLET ORAL DAILY
COMMUNITY
Start: 2020-11-11 | End: 2020-12-17 | Stop reason: SDUPTHER

## 2020-12-17 RX ORDER — AMIODARONE HYDROCHLORIDE 200 MG/1
200 TABLET ORAL DAILY
Qty: 30 TABLET | Refills: 0 | Status: SHIPPED | OUTPATIENT
Start: 2020-12-17 | End: 2021-01-06 | Stop reason: SDUPTHER

## 2020-12-28 RX ORDER — LISINOPRIL 20 MG/1
20 TABLET ORAL DAILY
Qty: 30 TABLET | Refills: 0 | OUTPATIENT
Start: 2020-12-28

## 2020-12-28 RX ORDER — LANSOPRAZOLE 30 MG/1
30 CAPSULE, DELAYED RELEASE ORAL DAILY
Qty: 90 CAPSULE | Refills: 1 | OUTPATIENT
Start: 2020-12-28

## 2020-12-28 RX ORDER — AMIODARONE HYDROCHLORIDE 200 MG/1
200 TABLET ORAL DAILY
Qty: 30 TABLET | Refills: 0 | OUTPATIENT
Start: 2020-12-28

## 2020-12-28 RX ORDER — CLONAZEPAM 1 MG/1
1 TABLET ORAL 3 TIMES DAILY PRN
Qty: 270 TABLET | Refills: 0 | OUTPATIENT
Start: 2020-12-28 | End: 2021-03-28

## 2020-12-28 RX ORDER — GABAPENTIN 600 MG/1
600 TABLET ORAL 4 TIMES DAILY
Qty: 360 TABLET | Refills: 1 | OUTPATIENT
Start: 2020-12-28 | End: 2021-05-29

## 2020-12-28 RX ORDER — SITAGLIPTIN AND METFORMIN HYDROCHLORIDE 1000; 50 MG/1; MG/1
1 TABLET, FILM COATED ORAL 2 TIMES DAILY WITH MEALS
Qty: 60 TABLET | Refills: 0 | OUTPATIENT
Start: 2020-12-28

## 2020-12-28 ASSESSMENT — ENCOUNTER SYMPTOMS
EYE DISCHARGE: 0
NAUSEA: 0
EYE REDNESS: 0
VOMITING: 0
RHINORRHEA: 0

## 2020-12-28 NOTE — PROGRESS NOTES
[address] [place] [address] Rd, [address]  Phone:  [phone]  Fax:  [phone]         Name: Delmy Shipley  : 1950         Chief Complaint:     Chief Complaint   Patient presents with    6 Month Follow-Up     recently had pacemaker insertion  by Dr Farooq Mclaughlin Diabetes       History of Present Illness:     Eric Robertson is a 78 yo female who presents today for follow-up of T2DM, hypertension, hypothyroidism, and anxiety. She had a pacemaker placed by Dr. Yahir Teresa in September after which she's had a collapsed lung. Since that time she's had left lower chest wall/rib pain. She's had CXRs which were unremarkable. She did have sudden left-sided hearing loss and disequilibrium for which she's following with Dr. Jena Hunt at [facility]. She will be going for an MRI brain tomorrow for further evaluation. Diabetes  She has type 2 diabetes mellitus. The initial diagnosis of diabetes was made 18 years ago. Hypoglycemia symptoms include dizziness. Pertinent negatives for diabetes include no chest pain and no weakness. Risk factors for coronary artery disease include hypertension and diabetes mellitus. Current diabetic treatment includes oral agent (monotherapy). She is compliant with treatment all of the time. Her weight is stable. She is following a generally healthy diet. Hypertension  This is a chronic problem. The current episode started more than 1 year ago. Pertinent negatives include no chest pain or palpitations. Hypothyroidism  Recent symptoms: anxiety. She denies weight gain and weight loss. Patient is  taking her medication consistently on an empty stomach.     Lab Results   Component Value Date    TSHREFLEX 1.396 2020     Lab Results   Component Value Date    TSH 1.581 2019    TSH 2.380 2016    TSH 1.930 2015     Health Maintenance  Last mammogram:  At [facility]  Last colon CA screening:  With Dr. Marie Guzman  Last pneumococcal vaccine:  Never; declines      PastMedical History:     Past Medical History:   Diagnosis Date    Anxiety     Arthritis     Atrial fibrillation (HCC)     Fibromyalgia     GERD (gastroesophageal reflux disease)     Hyperlipidemia     Hypertension     Hypothyroidism     Neuropathy     arms and legs    Osteoporosis     Type 2 diabetes mellitus without complication (HCC)         Past Surgical History:     Past Surgical History:   Procedure Laterality Date    APPENDECTOMY      BLADDER SURGERY      nerve block placed per pt x 3    BLADDER SUSPENSION      CARPAL TUNNEL RELEASE Right     CHOLECYSTECTOMY      COLONOSCOPY  ,     CYST REMOVAL      HYSTERECTOMY      PACEMAKER INSERTION  2020    Dr Autumn Gomez UPPER GASTROINTESTINAL ENDOSCOPY  [id number]    UPPER GASTROINTESTINAL ENDOSCOPY          Family History:     Family History   Problem Relation Age of Onset    Arthritis Mother     Miscarriages / Stillbirths Mother     Lung Cancer Mother 80    Hearing Loss Father     Heart Disease Father     High Blood Pressure Father     Cancer Sister 67        tongue ca'    Esophageal Cancer Brother 61    Diabetes Paternal Aunt     Esophageal Cancer Brother 72    Esophageal Cancer Brother 79    Uterine Cancer Sister        Social History:     Social:    Social History     Socioeconomic History    Marital status:      Spouse name: Not on file    Number of children: Not on file    Years of education: Not on file    Highest education level: Not on file   Occupational History    Not on file   Social Needs    Financial resource strain: Not on file    Food insecurity     Worry: Not on file     Inability: Not on file    Transportation needs     Medical: Not on file     Non-medical: Not on file   Tobacco Use    Smoking status: Former Smoker     Packs/day: 0.25     Years: 4.00     Pack years: 1.00     Types: Cigarettes     Quit date: 1989     Years since quittin.0    90 days. (Patient taking differently: Take 1 mg by mouth 3 times daily as needed for Anxiety. ) 270 tablet 0    lansoprazole (PREVACID) 30 MG delayed release capsule Take 1 capsule by mouth daily 90 capsule 1     No current facility-administered medications for this visit. Review of Systems:      Review of Systems   Constitutional: Negative for fever. HENT: Positive for hearing loss. Negative for rhinorrhea. Eyes: Negative for discharge and redness. Cardiovascular: Negative for chest pain and palpitations. Gastrointestinal: Negative for nausea and vomiting. Genitourinary: Negative for dysuria and frequency. Musculoskeletal: Positive for arthralgias, back pain and myalgias. Allergic/Immunologic: Negative for environmental allergies and food allergies. Neurological: Positive for dizziness and numbness (bilateral feet). Negative for weakness. Psychiatric/Behavioral: Negative for decreased concentration and dysphoric mood. Physical Exam:     Vitals:  Blood pressure 128/68, pulse 64, temperature 97.1 °F (36.2 °C), height 5' 4\" (1.626 m), weight 142 lb (64.4 kg), SpO2 98 %. Physical Exam  Vitals signs and nursing note reviewed. Constitutional:       General: She is not in acute distress. Appearance: She is well-developed. HENT:      Head: Normocephalic and atraumatic. Nose: Nose normal.   Eyes:      Conjunctiva/sclera: Conjunctivae normal.   Neck:      Musculoskeletal: Normal range of motion and neck supple. Cardiovascular:      Rate and Rhythm: Normal rate and regular rhythm. Pulmonary:      Effort: Pulmonary effort is normal. No respiratory distress. Breath sounds: Normal breath sounds. No wheezing. Abdominal:      General: Bowel sounds are normal. There is no distension. Palpations: Abdomen is soft. Tenderness: There is no abdominal tenderness. Skin:     General: Skin is warm and dry. Findings: No erythema or rash.    Neurological:      Mental Status: She is alert and oriented to person, place, and time. Psychiatric:         Behavior: Behavior normal.       Assessment/Plan:     Yamilex Edmond was seen today for 6 month follow-up and diabetes. Diagnoses and all orders for this visit:    Type 2 diabetes mellitus with diabetic polyneuropathy, without long-term current use of insulin (HCC)  -     HbA1c has decreased from 6.5% to 5.5% so will discontinue Amaryl and continue on Janumet. A healthy low-carb diet and routine physical activity encouraged. -     POCT microalbumin  -     POCT glycosylated hemoglobin (Hb A1C)  -     Lipid Panel; Future  -     Comprehensive Metabolic Panel; Future    Essential hypertension        -     BP is controlled so will continue on current medications. Acquired hypothyroidism        -     Last TSH was WNL so will continue on current medication. Anxiety        -     Mood has been stable so will continue on Clonazpeam.    At high risk for falls        -     On the basis of positive falls risk screening, assessment and plan is as follows: home safety tips provided. Left-sided chest wall pain  -      Her left lower chest wall tenderness may still be from her pacemaker insertion/collapsed lung but will check imaging for further evaluation. -      CT CHEST WO CONTRAST;  Future      Electronically signed by Cheryle Kaiser, MD on 12/30/2020 at 9:53 AM

## 2020-12-30 ENCOUNTER — OFFICE VISIT (OUTPATIENT)
Dept: FAMILY MEDICINE CLINIC | Age: 70
End: 2020-12-30
Payer: MEDICARE

## 2020-12-30 VITALS
DIASTOLIC BLOOD PRESSURE: 68 MMHG | SYSTOLIC BLOOD PRESSURE: 128 MMHG | WEIGHT: 142 LBS | OXYGEN SATURATION: 98 % | HEIGHT: 64 IN | BODY MASS INDEX: 24.24 KG/M2 | TEMPERATURE: 97.1 F | HEART RATE: 64 BPM

## 2020-12-30 LAB
BILIRUBIN, POC: NEGATIVE
BLOOD URINE, POC: NEGATIVE
CLARITY, POC: CLEAR
COLOR, POC: YELLOW
CREATININE URINE POCT: 200
GLUCOSE URINE, POC: NEGATIVE
HBA1C MFR BLD: 5.5 %
KETONES, POC: NEGATIVE
LEUKOCYTE EST, POC: NEGATIVE
MICROALBUMIN/CREAT 24H UR: 10 MG/G{CREAT}
MICROALBUMIN/CREAT UR-RTO: 30
NITRITE, POC: POSITIVE
PH, POC: 5.5
PROTEIN, POC: NEGATIVE
SPECIFIC GRAVITY, POC: 1.03
UROBILINOGEN, POC: 0.2

## 2020-12-30 PROCEDURE — 83036 HEMOGLOBIN GLYCOSYLATED A1C: CPT | Performed by: FAMILY MEDICINE

## 2020-12-30 PROCEDURE — 81002 URINALYSIS NONAUTO W/O SCOPE: CPT | Performed by: FAMILY MEDICINE

## 2020-12-30 PROCEDURE — 99214 OFFICE O/P EST MOD 30 MIN: CPT | Performed by: FAMILY MEDICINE

## 2020-12-30 PROCEDURE — 82044 UR ALBUMIN SEMIQUANTITATIVE: CPT | Performed by: FAMILY MEDICINE

## 2020-12-30 RX ORDER — PROPAFENONE HYDROCHLORIDE 150 MG/1
150 TABLET, FILM COATED ORAL 2 TIMES DAILY
Status: ON HOLD | COMMUNITY
End: 2021-03-02

## 2020-12-30 RX ORDER — CEPHALEXIN 500 MG/1
500 CAPSULE ORAL 2 TIMES DAILY
Qty: 14 CAPSULE | Refills: 0 | Status: SHIPPED | OUTPATIENT
Start: 2020-12-30 | End: 2021-01-06

## 2020-12-30 ASSESSMENT — ENCOUNTER SYMPTOMS: BACK PAIN: 1

## 2020-12-31 LAB
A/G RATIO: 1.4 (ref 1.5–2.5)
ALBUMIN SERPL-MCNC: 4.2 GM/DL (ref 3.5–5)
ALP BLD-CCNC: 40 IU/L (ref 39–118)
ALT SERPL-CCNC: 11 IU/L (ref 10–40)
ANION GAP SERPL CALCULATED.3IONS-SCNC: 6 MMOL/L (ref 4–12)
AST SERPL-CCNC: 13 IU/L (ref 15–41)
BILIRUB SERPL-MCNC: 0.2 MG/DL (ref 0.2–1)
BUN BLDV-MCNC: 18 MG/DL (ref 7–20)
CALCIUM SERPL-MCNC: 9.1 MG/DL (ref 8.8–10.5)
CHLORIDE BLD-SCNC: 106 MEQ/L (ref 101–111)
CHOLESTEROL/HDL RELATIVE RISK: 5.1 (ref 4–4.4)
CHOLESTEROL: 246 MG/DL
CO2: 28 MEQ/L (ref 21–32)
CREAT SERPL-MCNC: 0.85 MG/DL (ref 0.6–1.3)
CREATININE CLEARANCE: >60
DIRECT-LDL / HDL RISK: 3.3
GLUCOSE: 89 MG/DL (ref 70–110)
HDLC SERPL-MCNC: 48 MG/DL
LDL CHOLESTEROL DIRECT: 159 MG/DL
POTASSIUM SERPL-SCNC: 4.8 MEQ/L (ref 3.6–5)
SODIUM BLD-SCNC: 140 MEQ/L (ref 135–145)
TOTAL PROTEIN: 7.2 G/DL (ref 6.2–8)
TRIGL SERPL-MCNC: 415 MG/DL
VLDLC SERPL CALC-MCNC: ABNORMAL MG/DL

## 2021-01-04 DIAGNOSIS — F41.9 ANXIETY: ICD-10-CM

## 2021-01-04 DIAGNOSIS — I10 ESSENTIAL HYPERTENSION: ICD-10-CM

## 2021-01-04 RX ORDER — LISINOPRIL 20 MG/1
20 TABLET ORAL DAILY
Qty: 30 TABLET | Refills: 0 | OUTPATIENT
Start: 2021-01-04

## 2021-01-04 RX ORDER — METOPROLOL SUCCINATE 50 MG/1
50 TABLET, EXTENDED RELEASE ORAL DAILY
Qty: 30 TABLET | Refills: 0 | OUTPATIENT
Start: 2021-01-04

## 2021-01-04 RX ORDER — AMIODARONE HYDROCHLORIDE 200 MG/1
200 TABLET ORAL DAILY
Qty: 30 TABLET | Refills: 0 | OUTPATIENT
Start: 2021-01-04

## 2021-01-04 RX ORDER — CLONAZEPAM 1 MG/1
1 TABLET ORAL 3 TIMES DAILY PRN
Qty: 270 TABLET | Refills: 0 | OUTPATIENT
Start: 2021-01-04 | End: 2021-04-04

## 2021-01-04 NOTE — TELEPHONE ENCOUNTER
Flaquito Palomino called requesting a refill on the following medications:  Requested Prescriptions     Pending Prescriptions Disp Refills    metoprolol succinate (TOPROL XL) 50 MG extended release tablet 30 tablet 0     Sig: Take 1 tablet by mouth daily    amiodarone (CORDARONE) 200 MG tablet 30 tablet 0     Sig: Take 1 tablet by mouth daily    clonazePAM (KLONOPIN) 1 MG tablet 270 tablet 0     Sig: Take 1 tablet by mouth 3 times daily as needed for Anxiety for up to 90 days.     lisinopril (PRINIVIL;ZESTRIL) 20 MG tablet 30 tablet 0     Sig: Take 1 tablet by mouth daily     Pharmacy verified:  [email or web address]      Date of last visit: 12/30/20  Date of next visit (if applicable): 9/97/8916

## 2021-01-04 NOTE — TELEPHONE ENCOUNTER
----- Message from Arabella Aleman MD sent at 12/31/2020  5:15 PM EST -----  Cholesterol is elevated so I recommend a healthy low fat diet, increased physical activity, and a statin.

## 2021-01-05 RX ORDER — SITAGLIPTIN AND METFORMIN HYDROCHLORIDE 1000; 50 MG/1; MG/1
1 TABLET, FILM COATED ORAL 2 TIMES DAILY WITH MEALS
Qty: 60 TABLET | Refills: 0 | Status: SHIPPED | OUTPATIENT
Start: 2021-01-05 | End: 2021-01-07

## 2021-01-06 DIAGNOSIS — F41.9 ANXIETY: ICD-10-CM

## 2021-01-06 DIAGNOSIS — I10 ESSENTIAL HYPERTENSION: ICD-10-CM

## 2021-01-06 RX ORDER — CLONAZEPAM 1 MG/1
1 TABLET ORAL 3 TIMES DAILY PRN
Qty: 270 TABLET | Refills: 0 | Status: SHIPPED | OUTPATIENT
Start: 2021-01-06 | End: 2021-04-12 | Stop reason: SDUPTHER

## 2021-01-06 RX ORDER — LISINOPRIL 20 MG/1
20 TABLET ORAL DAILY
Qty: 90 TABLET | Refills: 1 | Status: ON HOLD
Start: 2021-01-06 | End: 2021-03-03 | Stop reason: HOSPADM

## 2021-01-06 RX ORDER — AMIODARONE HYDROCHLORIDE 200 MG/1
200 TABLET ORAL DAILY
Qty: 90 TABLET | Refills: 1 | Status: SHIPPED | OUTPATIENT
Start: 2021-01-06

## 2021-01-06 RX ORDER — METOPROLOL SUCCINATE 50 MG/1
50 TABLET, EXTENDED RELEASE ORAL DAILY
Qty: 90 TABLET | Refills: 1 | Status: SHIPPED | OUTPATIENT
Start: 2021-01-06

## 2021-01-06 NOTE — TELEPHONE ENCOUNTER
Moon Robles called requesting a refill on the following medications:  Requested Prescriptions     Pending Prescriptions Disp Refills    clonazePAM (KLONOPIN) 1 MG tablet 270 tablet 0     Sig: Take 1 tablet by mouth 3 times daily as needed for Anxiety for up to 90 days.     lisinopril (PRINIVIL;ZESTRIL) 20 MG tablet 90 tablet 1     Sig: Take 1 tablet by mouth daily    metoprolol succinate (TOPROL XL) 50 MG extended release tablet 90 tablet 1     Sig: Take 1 tablet by mouth daily    amiodarone (CORDARONE) 200 MG tablet 90 tablet 1     Sig: Take 1 tablet by mouth daily       Date of last visit: 12/30/2020  Date of next visit (if applicable):6/28/2021  Date of last refill: Klonipin last filled 05/19/20 #270 x NR  Pharmacy Name: [facility] ([address] [place] [address]) Problem: Adult Inpatient Plan of Care  Goal: Plan of Care Review  Outcome: Ongoing (interventions implemented as appropriate)  Pt AAOx4, VSS.  HTN meds on board.  Denies pain, SOB, or n/v.  No complaints or issues noted.  Tolerating diet without issues.  Blood sugars checked ACHS.  Meal insulin given as scheduled along with SSI PRN.  No BM for the last few days.  PRN suppository given.  Good urine output.  Kidney function monitored on am labs.  Fall precautions in place.  Bed lowered and locked. Call bell in reach.  Non-skid socks on feet. Pt instructed to call for assistance when needed.   No acute events/falls/injuries this shift. See flowsheet for further assessment findings. Will monitor.

## 2021-01-07 RX ORDER — SITAGLIPTIN AND METFORMIN HYDROCHLORIDE 1000; 50 MG/1; MG/1
1 TABLET, FILM COATED ORAL 2 TIMES DAILY WITH MEALS
Qty: 60 TABLET | Refills: 0 | Status: SHIPPED | OUTPATIENT
Start: 2021-01-07

## 2021-01-11 ENCOUNTER — TELEPHONE (OUTPATIENT)
Dept: FAMILY MEDICINE CLINIC | Age: 71
End: 2021-01-11

## 2021-01-12 ENCOUNTER — TELEPHONE (OUTPATIENT)
Dept: FAMILY MEDICINE CLINIC | Age: 71
End: 2021-01-12

## 2021-01-12 NOTE — TELEPHONE ENCOUNTER
Patient phoned with 2 concerns:    1. Patient went to [facility] and they did not have Amiodarone Rx that was sent 01/06/21, it shows receipt confirmed on our end. Told Stefania Guerrero that I would check into it and see what they need from us. I spoke with Paula Hernandez @ [facility] who confirmed receipt of the other prescriptions but not the Amiodarone. I gave verbal order to Paula Hernandez and she will get it filled for the patient. 2.  Stefania Guerrero is concerned that with her CT being ok, she is still having the pain in her side and abdomen. Wanting to know what the next step should be to determine the cause of her discomfort.  Please advise

## 2021-01-12 NOTE — TELEPHONE ENCOUNTER
I am honestly unsure of the cause of her discomfort. Has she followed up with Dr. Enrico Friedman since her pacemaker insertion/collapsed lung?

## 2021-01-13 NOTE — TELEPHONE ENCOUNTER
Called and spoke with Ronnie Monterroso , she is upset that no source of her pain is being found. She did follow up with Dr. Treva Lyon and he says with her traumatic procedure it will take a while for the pain to subside. She says she can't afford to get 1 test at a time. Also she tells me she is looking for a new PCP because of the run a round with refilling her medications. Suggested she talk to  our  and she refused.

## 2021-01-25 ENCOUNTER — HOSPITAL ENCOUNTER (OUTPATIENT)
Dept: CT IMAGING | Age: 71
Discharge: HOME | End: 2021-01-25
Payer: MEDICARE

## 2021-01-25 DIAGNOSIS — R10.32 ABDOMINAL PAIN, LEFT LOWER QUADRANT: ICD-10-CM

## 2021-01-25 PROCEDURE — 74177 CT ABD & PELVIS W/CONTRAST: CPT

## 2021-01-25 PROCEDURE — 6360000004 HC RX CONTRAST MEDICATION: Performed by: NURSE PRACTITIONER

## 2021-01-25 RX ADMIN — IOHEXOL 50 ML: 240 INJECTION, SOLUTION INTRATHECAL; INTRAVASCULAR; INTRAVENOUS; ORAL at 10:10

## 2021-01-25 RX ADMIN — IOPAMIDOL 85 ML: 755 INJECTION, SOLUTION INTRAVENOUS at 10:10

## 2021-03-02 ENCOUNTER — HOSPITAL ENCOUNTER (OUTPATIENT)
Age: 71
Setting detail: OBSERVATION
Discharge: HOME | End: 2021-03-03
Attending: INTERNAL MEDICINE | Admitting: INTERNAL MEDICINE
Payer: MEDICARE

## 2021-03-02 ENCOUNTER — APPOINTMENT (OUTPATIENT)
Dept: CT IMAGING | Age: 71
End: 2021-03-02
Payer: MEDICARE

## 2021-03-02 DIAGNOSIS — R10.12 LEFT UPPER QUADRANT ABDOMINAL PAIN: Primary | ICD-10-CM

## 2021-03-02 DIAGNOSIS — R79.89 HIGH SERUM LACTIC ACID: ICD-10-CM

## 2021-03-02 DIAGNOSIS — R77.8 ELEVATED TROPONIN: ICD-10-CM

## 2021-03-02 DIAGNOSIS — E11.65 UNCONTROLLED TYPE 2 DIABETES MELLITUS WITH HYPERGLYCEMIA (HCC): ICD-10-CM

## 2021-03-02 PROBLEM — E87.20 LACTIC ACIDOSIS: Status: ACTIVE | Noted: 2021-03-02

## 2021-03-02 PROBLEM — R10.11 RUQ ABDOMINAL PAIN: Status: ACTIVE | Noted: 2021-03-02

## 2021-03-02 PROBLEM — N17.9 AKI (ACUTE KIDNEY INJURY) (HCC): Status: ACTIVE | Noted: 2021-03-02

## 2021-03-02 PROBLEM — E03.9 HYPOTHYROIDISM: Status: ACTIVE | Noted: 2021-03-02

## 2021-03-02 LAB
ALBUMIN SERPL-MCNC: 3.9 G/DL (ref 3.5–5.1)
ALP BLD-CCNC: 41 U/L (ref 38–126)
ALT SERPL-CCNC: 12 U/L (ref 11–66)
ANION GAP SERPL CALCULATED.3IONS-SCNC: 18 MEQ/L (ref 8–16)
AST SERPL-CCNC: 14 U/L (ref 5–40)
AVERAGE GLUCOSE: 111 MG/DL (ref 70–126)
BASOPHILS # BLD: 0.6 %
BASOPHILS ABSOLUTE: 0 THOU/MM3 (ref 0–0.1)
BILIRUB SERPL-MCNC: < 0.2 MG/DL (ref 0.3–1.2)
BILIRUBIN URINE: NEGATIVE
BLOOD, URINE: NEGATIVE
BUN BLDV-MCNC: 24 MG/DL (ref 7–22)
CALCIUM SERPL-MCNC: 9.1 MG/DL (ref 8.5–10.5)
CHARACTER, URINE: CLEAR
CHLORIDE BLD-SCNC: 102 MEQ/L (ref 98–111)
CO2: 20 MEQ/L (ref 23–33)
COLOR: YELLOW
CREAT SERPL-MCNC: 1.2 MG/DL (ref 0.4–1.2)
EKG ATRIAL RATE: 67 BPM
EKG P AXIS: 28 DEGREES
EKG P-R INTERVAL: 234 MS
EKG Q-T INTERVAL: 454 MS
EKG QRS DURATION: 78 MS
EKG QTC CALCULATION (BAZETT): 479 MS
EKG R AXIS: 39 DEGREES
EKG T AXIS: 63 DEGREES
EKG VENTRICULAR RATE: 67 BPM
EOSINOPHIL # BLD: 1.7 %
EOSINOPHILS ABSOLUTE: 0.1 THOU/MM3 (ref 0–0.4)
ERYTHROCYTE [DISTWIDTH] IN BLOOD BY AUTOMATED COUNT: 13.6 % (ref 11.5–14.5)
ERYTHROCYTE [DISTWIDTH] IN BLOOD BY AUTOMATED COUNT: 44.6 FL (ref 35–45)
GFR SERPL CREATININE-BSD FRML MDRD: 44 ML/MIN/1.73M2
GLUCOSE BLD-MCNC: 140 MG/DL (ref 70–108)
GLUCOSE BLD-MCNC: 154 MG/DL (ref 70–108)
GLUCOSE BLD-MCNC: 164 MG/DL (ref 70–108)
GLUCOSE BLD-MCNC: 241 MG/DL (ref 70–108)
GLUCOSE BLD-MCNC: 55 MG/DL (ref 70–108)
GLUCOSE BLD-MCNC: 68 MG/DL (ref 70–108)
GLUCOSE BLD-MCNC: 88 MG/DL (ref 70–108)
GLUCOSE URINE: NEGATIVE MG/DL
HBA1C MFR BLD: 5.7 % (ref 4.4–6.4)
HCT VFR BLD CALC: 38.1 % (ref 37–47)
HEMOGLOBIN: 12 GM/DL (ref 12–16)
IMMATURE GRANS (ABS): 0.02 THOU/MM3 (ref 0–0.07)
IMMATURE GRANULOCYTES: 0.2 %
KETONES, URINE: ABNORMAL
LACTIC ACID: 4.7 MMOL/L (ref 0.5–2.2)
LACTIC ACID: 7.8 MMOL/L (ref 0.5–2.2)
LEUKOCYTE ESTERASE, URINE: NEGATIVE
LIPASE: 47.2 U/L (ref 5.6–51.3)
LYMPHOCYTES # BLD: 29.6 %
LYMPHOCYTES ABSOLUTE: 2.4 THOU/MM3 (ref 1–4.8)
MCH RBC QN AUTO: 28.3 PG (ref 26–33)
MCHC RBC AUTO-ENTMCNC: 31.5 GM/DL (ref 32.2–35.5)
MCV RBC AUTO: 89.9 FL (ref 81–99)
MONOCYTES # BLD: 6.5 %
MONOCYTES ABSOLUTE: 0.5 THOU/MM3 (ref 0.4–1.3)
NITRITE, URINE: NEGATIVE
NUCLEATED RED BLOOD CELLS: 0 /100 WBC
OSMOLALITY CALCULATION: 291.4 MOSMOL/KG (ref 275–300)
PH UA: 5 (ref 5–9)
PLATELET # BLD: 221 THOU/MM3 (ref 130–400)
PMV BLD AUTO: 9.3 FL (ref 9.4–12.4)
POTASSIUM SERPL-SCNC: 4.2 MEQ/L (ref 3.5–5.2)
PROCALCITONIN: 0.07 NG/ML (ref 0.01–0.09)
PROTEIN UA: NEGATIVE
RBC # BLD: 4.24 MILL/MM3 (ref 4.2–5.4)
SEG NEUTROPHILS: 61.4 %
SEGMENTED NEUTROPHILS ABSOLUTE COUNT: 5 THOU/MM3 (ref 1.8–7.7)
SODIUM BLD-SCNC: 140 MEQ/L (ref 135–145)
SPECIFIC GRAVITY, URINE: 1.01 (ref 1–1.03)
TOTAL PROTEIN: 7.1 G/DL (ref 6.1–8)
TROPONIN T: 0.01 NG/ML
TROPONIN T: 0.01 NG/ML
TROPONIN T: < 0.01 NG/ML
TSH SERPL DL<=0.05 MIU/L-ACNC: 2.02 UIU/ML (ref 0.4–4.2)
UROBILINOGEN, URINE: 0.2 EU/DL (ref 0–1)
WBC # BLD: 8.1 THOU/MM3 (ref 4.8–10.8)

## 2021-03-02 PROCEDURE — 6370000000 HC RX 637 (ALT 250 FOR IP): Performed by: INTERNAL MEDICINE

## 2021-03-02 PROCEDURE — 83605 ASSAY OF LACTIC ACID: CPT

## 2021-03-02 PROCEDURE — 96375 TX/PRO/DX INJ NEW DRUG ADDON: CPT

## 2021-03-02 PROCEDURE — 99204 OFFICE O/P NEW MOD 45 MIN: CPT | Performed by: INTERNAL MEDICINE

## 2021-03-02 PROCEDURE — 6360000004 HC RX CONTRAST MEDICATION: Performed by: PHYSICIAN ASSISTANT

## 2021-03-02 PROCEDURE — 80053 COMPREHEN METABOLIC PANEL: CPT

## 2021-03-02 PROCEDURE — 96361 HYDRATE IV INFUSION ADD-ON: CPT

## 2021-03-02 PROCEDURE — 83690 ASSAY OF LIPASE: CPT

## 2021-03-02 PROCEDURE — 2580000003 HC RX 258

## 2021-03-02 PROCEDURE — 99285 EMERGENCY DEPT VISIT HI MDM: CPT

## 2021-03-02 PROCEDURE — 85025 COMPLETE CBC W/AUTO DIFF WBC: CPT

## 2021-03-02 PROCEDURE — 6360000002 HC RX W HCPCS: Performed by: PHYSICIAN ASSISTANT

## 2021-03-02 PROCEDURE — 2580000003 HC RX 258: Performed by: INTERNAL MEDICINE

## 2021-03-02 PROCEDURE — 81003 URINALYSIS AUTO W/O SCOPE: CPT

## 2021-03-02 PROCEDURE — 82948 REAGENT STRIP/BLOOD GLUCOSE: CPT

## 2021-03-02 PROCEDURE — 84145 PROCALCITONIN (PCT): CPT

## 2021-03-02 PROCEDURE — 84443 ASSAY THYROID STIM HORMONE: CPT

## 2021-03-02 PROCEDURE — 99220 PR INITIAL OBSERVATION CARE/DAY 70 MINUTES: CPT | Performed by: INTERNAL MEDICINE

## 2021-03-02 PROCEDURE — G0378 HOSPITAL OBSERVATION PER HR: HCPCS

## 2021-03-02 PROCEDURE — 96374 THER/PROPH/DIAG INJ IV PUSH: CPT

## 2021-03-02 PROCEDURE — 2580000003 HC RX 258: Performed by: PHYSICIAN ASSISTANT

## 2021-03-02 PROCEDURE — 74177 CT ABD & PELVIS W/CONTRAST: CPT

## 2021-03-02 PROCEDURE — 84484 ASSAY OF TROPONIN QUANT: CPT

## 2021-03-02 PROCEDURE — 83036 HEMOGLOBIN GLYCOSYLATED A1C: CPT

## 2021-03-02 PROCEDURE — 93005 ELECTROCARDIOGRAM TRACING: CPT | Performed by: PHYSICIAN ASSISTANT

## 2021-03-02 PROCEDURE — 36415 COLL VENOUS BLD VENIPUNCTURE: CPT

## 2021-03-02 RX ORDER — NICOTINE POLACRILEX 4 MG
15 LOZENGE BUCCAL PRN
Status: DISCONTINUED | OUTPATIENT
Start: 2021-03-02 | End: 2021-03-03 | Stop reason: HOSPADM

## 2021-03-02 RX ORDER — CLONAZEPAM 1 MG/1
1 TABLET ORAL 3 TIMES DAILY PRN
Status: DISCONTINUED | OUTPATIENT
Start: 2021-03-02 | End: 2021-03-03 | Stop reason: HOSPADM

## 2021-03-02 RX ORDER — PROMETHAZINE HYDROCHLORIDE 25 MG/1
12.5 TABLET ORAL EVERY 6 HOURS PRN
Status: DISCONTINUED | OUTPATIENT
Start: 2021-03-02 | End: 2021-03-03 | Stop reason: HOSPADM

## 2021-03-02 RX ORDER — SODIUM CHLORIDE 0.9 % (FLUSH) 0.9 %
10 SYRINGE (ML) INJECTION PRN
Status: DISCONTINUED | OUTPATIENT
Start: 2021-03-02 | End: 2021-03-03 | Stop reason: HOSPADM

## 2021-03-02 RX ORDER — METOPROLOL SUCCINATE 50 MG/1
50 TABLET, EXTENDED RELEASE ORAL DAILY
Status: DISCONTINUED | OUTPATIENT
Start: 2021-03-03 | End: 2021-03-03 | Stop reason: HOSPADM

## 2021-03-02 RX ORDER — 0.9 % SODIUM CHLORIDE 0.9 %
1000 INTRAVENOUS SOLUTION INTRAVENOUS ONCE
Status: COMPLETED | OUTPATIENT
Start: 2021-03-02 | End: 2021-03-02

## 2021-03-02 RX ORDER — AMIODARONE HYDROCHLORIDE 200 MG/1
200 TABLET ORAL DAILY
Status: DISCONTINUED | OUTPATIENT
Start: 2021-03-03 | End: 2021-03-03 | Stop reason: HOSPADM

## 2021-03-02 RX ORDER — SODIUM CHLORIDE 0.9 % (FLUSH) 0.9 %
10 SYRINGE (ML) INJECTION EVERY 12 HOURS SCHEDULED
Status: DISCONTINUED | OUTPATIENT
Start: 2021-03-02 | End: 2021-03-03 | Stop reason: HOSPADM

## 2021-03-02 RX ORDER — DEXTROSE MONOHYDRATE 50 MG/ML
100 INJECTION, SOLUTION INTRAVENOUS PRN
Status: DISCONTINUED | OUTPATIENT
Start: 2021-03-02 | End: 2021-03-03 | Stop reason: HOSPADM

## 2021-03-02 RX ORDER — ONDANSETRON 2 MG/ML
4 INJECTION INTRAMUSCULAR; INTRAVENOUS EVERY 6 HOURS PRN
Status: DISCONTINUED | OUTPATIENT
Start: 2021-03-02 | End: 2021-03-03 | Stop reason: HOSPADM

## 2021-03-02 RX ORDER — ACETAMINOPHEN 650 MG/1
650 SUPPOSITORY RECTAL EVERY 6 HOURS PRN
Status: DISCONTINUED | OUTPATIENT
Start: 2021-03-02 | End: 2021-03-03 | Stop reason: HOSPADM

## 2021-03-02 RX ORDER — POLYETHYLENE GLYCOL 3350 17 G/17G
17 POWDER, FOR SOLUTION ORAL DAILY
Status: DISCONTINUED | OUTPATIENT
Start: 2021-03-02 | End: 2021-03-02

## 2021-03-02 RX ORDER — POLYETHYLENE GLYCOL 3350 17 G/17G
17 POWDER, FOR SOLUTION ORAL 3 TIMES DAILY
Status: COMPLETED | OUTPATIENT
Start: 2021-03-02 | End: 2021-03-02

## 2021-03-02 RX ORDER — MAGNESIUM SULFATE IN WATER 40 MG/ML
2000 INJECTION, SOLUTION INTRAVENOUS PRN
Status: DISCONTINUED | OUTPATIENT
Start: 2021-03-02 | End: 2021-03-03 | Stop reason: HOSPADM

## 2021-03-02 RX ORDER — LEVOTHYROXINE SODIUM 0.05 MG/1
50 TABLET ORAL DAILY
Status: DISCONTINUED | OUTPATIENT
Start: 2021-03-03 | End: 2021-03-03 | Stop reason: HOSPADM

## 2021-03-02 RX ORDER — DEXTROSE MONOHYDRATE 25 G/50ML
12.5 INJECTION, SOLUTION INTRAVENOUS ONCE
Status: COMPLETED | OUTPATIENT
Start: 2021-03-02 | End: 2021-03-02

## 2021-03-02 RX ORDER — POTASSIUM CHLORIDE 20 MEQ/1
40 TABLET, EXTENDED RELEASE ORAL PRN
Status: DISCONTINUED | OUTPATIENT
Start: 2021-03-02 | End: 2021-03-03 | Stop reason: HOSPADM

## 2021-03-02 RX ORDER — PANTOPRAZOLE SODIUM 40 MG/1
40 TABLET, DELAYED RELEASE ORAL
Status: DISCONTINUED | OUTPATIENT
Start: 2021-03-03 | End: 2021-03-03 | Stop reason: HOSPADM

## 2021-03-02 RX ORDER — GABAPENTIN 600 MG/1
600 TABLET ORAL 2 TIMES DAILY
COMMUNITY

## 2021-03-02 RX ORDER — POTASSIUM CHLORIDE 7.45 MG/ML
10 INJECTION INTRAVENOUS PRN
Status: DISCONTINUED | OUTPATIENT
Start: 2021-03-02 | End: 2021-03-03 | Stop reason: HOSPADM

## 2021-03-02 RX ORDER — SODIUM CHLORIDE 9 MG/ML
INJECTION, SOLUTION INTRAVENOUS CONTINUOUS
Status: DISCONTINUED | OUTPATIENT
Start: 2021-03-02 | End: 2021-03-03 | Stop reason: HOSPADM

## 2021-03-02 RX ORDER — DEXTROSE MONOHYDRATE 25 G/50ML
INJECTION, SOLUTION INTRAVENOUS
Status: COMPLETED
Start: 2021-03-02 | End: 2021-03-02

## 2021-03-02 RX ORDER — KETOROLAC TROMETHAMINE 30 MG/ML
15 INJECTION, SOLUTION INTRAMUSCULAR; INTRAVENOUS ONCE
Status: COMPLETED | OUTPATIENT
Start: 2021-03-02 | End: 2021-03-02

## 2021-03-02 RX ORDER — ACETAMINOPHEN 325 MG/1
650 TABLET ORAL EVERY 6 HOURS PRN
Status: DISCONTINUED | OUTPATIENT
Start: 2021-03-02 | End: 2021-03-03 | Stop reason: HOSPADM

## 2021-03-02 RX ORDER — DEXTROSE MONOHYDRATE 25 G/50ML
12.5 INJECTION, SOLUTION INTRAVENOUS PRN
Status: DISCONTINUED | OUTPATIENT
Start: 2021-03-02 | End: 2021-03-03 | Stop reason: HOSPADM

## 2021-03-02 RX ADMIN — SODIUM CHLORIDE, PRESERVATIVE FREE 10 ML: 5 INJECTION INTRAVENOUS at 15:28

## 2021-03-02 RX ADMIN — POLYETHYLENE GLYCOL 3350 17 G: 17 POWDER, FOR SOLUTION ORAL at 20:50

## 2021-03-02 RX ADMIN — SODIUM CHLORIDE 1000 ML: 9 INJECTION, SOLUTION INTRAVENOUS at 09:34

## 2021-03-02 RX ADMIN — IOPAMIDOL 80 ML: 755 INJECTION, SOLUTION INTRAVENOUS at 10:56

## 2021-03-02 RX ADMIN — DEXTROSE MONOHYDRATE 12.5 G: 25 INJECTION, SOLUTION INTRAVENOUS at 13:37

## 2021-03-02 RX ADMIN — KETOROLAC TROMETHAMINE 15 MG: 30 INJECTION, SOLUTION INTRAMUSCULAR; INTRAVENOUS at 09:34

## 2021-03-02 RX ADMIN — INSULIN LISPRO 1 UNITS: 100 INJECTION, SOLUTION INTRAVENOUS; SUBCUTANEOUS at 20:52

## 2021-03-02 RX ADMIN — SODIUM CHLORIDE: 9 INJECTION, SOLUTION INTRAVENOUS at 15:27

## 2021-03-02 ASSESSMENT — ENCOUNTER SYMPTOMS
DIARRHEA: 0
NAUSEA: 1
BACK PAIN: 0
PHOTOPHOBIA: 0
RHINORRHEA: 0
CONSTIPATION: 1
SHORTNESS OF BREATH: 0
VOMITING: 0
SORE THROAT: 0
COUGH: 0
ABDOMINAL PAIN: 1

## 2021-03-02 ASSESSMENT — PAIN DESCRIPTION - ORIENTATION: ORIENTATION: LEFT

## 2021-03-02 ASSESSMENT — PAIN DESCRIPTION - ONSET: ONSET: ON-GOING

## 2021-03-02 ASSESSMENT — PAIN DESCRIPTION - PAIN TYPE
TYPE: ACUTE PAIN

## 2021-03-02 ASSESSMENT — PAIN DESCRIPTION - LOCATION
LOCATION: ABDOMEN
LOCATION: ABDOMEN

## 2021-03-02 ASSESSMENT — PAIN - FUNCTIONAL ASSESSMENT: PAIN_FUNCTIONAL_ASSESSMENT: ACTIVITIES ARE NOT PREVENTED

## 2021-03-02 ASSESSMENT — PAIN SCALES - GENERAL
PAINLEVEL_OUTOF10: 6
PAINLEVEL_OUTOF10: 7
PAINLEVEL_OUTOF10: 6
PAINLEVEL_OUTOF10: 8

## 2021-03-02 ASSESSMENT — PAIN DESCRIPTION - DESCRIPTORS: DESCRIPTORS: ACHING

## 2021-03-02 NOTE — H&P
Hospitalist H+P      Patient:  Loni Hall    Unit/Bed:07/007A  YOB: 1950  MRN: [id number]   Acct: [de-identified]     PCP: Aracelis Blakely  Date of Admission: 3/2/2021        Assessment and Plan:        1. Abdominal pain left upper quadrant: We will place on a clear liquid diet, consult gastroenterology Dr. Laura Aguilar per the patient's request.  2. Lactic acidosis possibly due to ischemic bowel, Metformin with dehydration, will start gentle IV fluid rehydration and repeat lactic acid  3. Type 2 diabetes: Without long-term current use of insulin, will hold all home medications including Metformin, place on n.p.o. low-dose insulin sliding scale, will check a hemoglobin A1c and a urine microalbumin  4. Acute kidney injury on chronic kidney disease: We will hold all nephrotoxic drugs (Altase), will start gentle fluid rehydration will monitor blood pressure may need to make changes to her medication. 5. Essential hypertension we will continue with home medication  6. Atrial fibrillation paroxysmal we will continue with amiodarone and Xarelto  7. This slightly elevated troponin we will trend    CC: Nominal pain and constipation    HPI: 80-year-old white female presents to the emergency department concern for bowel blockage. In September patient had a pacemaker placed with poor outcome of a pneumothorax. She had a chest tube for 10 days. Since then she has had left upper quadrant pain described as soreness, it is consistent worsening with bending over and performing certain activities such as sweeping. It is worse over the last few months. Her PCP performed outpatient abdominal scan 2 months ago and was showing that she was full of stool. Patient admits to being constipated having bowel movements every 3 days and she just enough to really relieve the pressure. Patient has small bowel movement last night and prior to that about 2 to 3 days. She tried an enema yesterday with no relief.   Spouse reports that the patient is scared of getting a colostomy. Patient states she is passing gas but denies vomiting, chest pain, dyspnea, fever, chills, urinary symptoms or other complaints. Past medical history includes anxiety, atrial fibrillation, fibromyalgia, GERD, hyperlipidemia, hypertension, hypothyroidism, neuropathy, type 2 diabetes    ROS (14 point review of systems completed. Pertinent positives noted. Otherwise ROS is negative) :  Abdominal pain, constipation, nausea    PMH:  Per HPI and       Diagnosis Date    Anxiety     Arthritis     Atrial fibrillation (HCC)     Fibromyalgia     GERD (gastroesophageal reflux disease)     Hyperlipidemia     Hypertension     Hypothyroidism     Neuropathy     arms and legs    Osteoporosis     Type 2 diabetes mellitus without complication (HCC)      SHX:        Procedure Laterality Date    APPENDECTOMY      BLADDER SURGERY      nerve block placed per pt x 3    BLADDER SUSPENSION      CARPAL TUNNEL RELEASE Right     CHOLECYSTECTOMY      COLONOSCOPY  2010, 2013    CYST REMOVAL      HYSTERECTOMY      PACEMAKER INSERTION  09/2020    Dr Sheryle Pollard UPPER GASTROINTESTINAL ENDOSCOPY  [id number]    UPPER GASTROINTESTINAL ENDOSCOPY       FHX:       Problem Relation Age of Onset    Arthritis Mother     Miscarriages / Stillbirths Mother     Lung Cancer Mother 80    Hearing Loss Father     Heart Disease Father     High Blood Pressure Father     Cancer Sister 67        tongue ca'    Esophageal Cancer Brother 61    Diabetes Paternal Aunt     Esophageal Cancer Brother 72    Esophageal Cancer Brother 79    Uterine Cancer Sister      SOCHX:   Social History     Socioeconomic History    Marital status:      Spouse name: None    Number of children: None    Years of education: None    Highest education level: None   Occupational History    None   Social Needs    Financial resource strain: None    Food insecurity     Worry: None Inability: None    Transportation needs     Medical: None     Non-medical: None   Tobacco Use    Smoking status: Former Smoker     Packs/day: 0.25     Years: 4.00     Pack years: 1.00     Types: Cigarettes     Quit date: 1989     Years since quittin.1    Smokeless tobacco: Never Used    Tobacco comment: 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   Substance and Sexual Activity    Alcohol use: Yes     Comment: very rarely    Drug use: No    Sexual activity: None   Lifestyle    Physical activity     Days per week: None     Minutes per session: None    Stress: None   Relationships    Social connections     Talks on phone: None     Gets together: None     Attends Druze service: None     Active member of club or organization: None     Attends meetings of clubs or organizations: None     Relationship status: None    Intimate partner violence     Fear of current or ex partner: None     Emotionally abused: None     Physically abused: None     Forced sexual activity: None   Other Topics Concern    None   Social History Narrative    None      Allergies: Codeine and Sulfa antibiotics  Medications:        Prior to Admission medications    Medication Sig Start Date End Date Taking? Authorizing Provider   JANRAHEEM  MG per tablet Take 1 tablet by mouth 2 times daily (with meals) 21   Carlos Colbert MD   clonazePAM (KLONOPIN) 1 MG tablet Take 1 tablet by mouth 3 times daily as needed for Anxiety for up to 90 days.  21  Carlos Colbert MD   lisinopril (PRINIVIL;ZESTRIL) 20 MG tablet Take 1 tablet by mouth daily 21   Carlos Colbert MD   metoprolol succinate (TOPROL XL) 50 MG extended release tablet Take 1 tablet by mouth daily 21   Carlos Colbert MD   amiodarone (CORDARONE) 200 MG tablet Take 1 tablet by mouth daily 21   Carlos Colbert MD   rivaroxaban (XARELTO) 20 MG TABS tablet Take 20 mg by mouth Daily with supper    Historical Provider, MD propafenone (RYTHMOL) 150 MG tablet Take 150 mg by mouth 2 times daily    Historical Provider, MD   levothyroxine (SYNTHROID) 50 MCG tablet Take 1 tablet by mouth Daily 12/15/20   Macarena Torres MD   JANUMET  MG per tablet Take 1 tablet by mouth 2 times daily (with meals) 12/7/20   Macarena Torres MD   gabapentin (NEURONTIN) 600 MG tablet Take 1 tablet by mouth 4 times daily for 152 days. One tablet in AM, one tablet at lunch time, 2 tablets at bedtime 5/19/20 12/30/20  Macarena Torres MD   lansoprazole (PREVACID) 30 MG delayed release capsule Take 1 capsule by mouth daily 5/19/20   Macarena Torres MD      PHYSICAL EXAM:    /63   Pulse 67   Temp 98.4 °F (36.9 °C) (Oral)   Resp 16   Ht 5' 4\" (1.626 m)   Wt 145 lb (65.8 kg)   SpO2 96%   BMI 24.89 kg/m²     General appearance:  No apparent distress, appears stated age and cooperative. HEENT:  Normal cephalic, atraumatic without obvious deformity. Pupils equal, round, and reactive to light. Extra ocular muscles intact. Conjunctivae/corneas clear. Neck: Supple, with full range of motion. no jugular venous distention. Trachea midline. no carotid bruits  Respiratory:  Normal respiratory effort. Clear to auscultation, bilaterally without Rales/Wheezes/Rhonchi. Breath sounds equal bilaterally  Cardiovascular:  Regular rate and rhythm with normal S1/S2 without murmurs, rubs or gallops. PMI non displaced  Abdomen: Soft, non-tender, non-distended with normal bowel sounds. No guarding, rebound. Musculoskeletal:  No clubbing, cyanosis or edema bilaterally. Full range of motion without deformity. Skin: Skin color, texture, turgor normal.  No rashes or lesions, or suspicious lesions. Neurologic:  Neurovascularly intact without any focal sensory/motor deficits.  Cranial nerves: II-XII intact, grossly non-focal.  Psychiatric:  Alert and oriented, thought content appropriate, normal insight  Capillary Refill: Brisk,< 2 seconds Peripheral Pulses: +2 palpable, equal bilaterally upper and lower extremities  Lymphatics: no lymphadenopathy    Data: (All radiographs, tracings, PFTs, and imaging are personally viewed and interpreted unless otherwise noted).  Creatinine 1.2 (baseline 0.85)   Potassium 4.2   Lactic acid seven-point 8 repeat after fluids was 4.7   Serum osmolality 291   Troponin 0.015  Recent Labs     03/02/21  0917   WBC 8.1   HGB 12.0   HCT 38.1         Recent Labs     03/02/21  0917      K 4.2      CO2 20*   BUN 24*   CREATININE 1.2   CALCIUM 9.1      Recent Labs     03/02/21  0917   AST 14   ALT 12   BILITOT <0.2*   ALKPHOS 41       No results for input(s): INR in the last 72 hours.  No results for input(s): Tona Journey in the last 72 hours. Radiology reports-mages reviewed in PACS  Ct Abdomen Pelvis W Iv Contrast Additional Contrast? Oral    Result Date: 3/2/2021  PROCEDURE: CT ABDOMEN PELVIS W IV CONTRAST CLINICAL INFORMATION: ABD PAIN . COMPARISON: CT scan of the abdomen and pelvis dated 25 January 2021. TECHNIQUE: Axial 5 mm CT images were obtained through the abdomen and pelvis after the administration of intravenous contrast. Coronal and sagittal reconstructions were obtained. All CT scans at this facility use dose modulation, iterative reconstruction, and/or weight-based dosing when appropriate to reduce radiation dose to as low as reasonably achievable. FINDINGS: This a calcified granuloma in the left lower lobe. .  There is a pacemaker in place. . The liver is normal. This evidence for granulomatous disease in the spleen. The adrenals and pancreas are normal. The patient is status post cholecystectomy. There is no hydronephrosis or stones of either kidney. No renal masses are noted. There is mild mucosal thickening in small bowel loops in the pelvis. There is no evidence of a small bowel obstruction. There is atherosclerotic calcification in the abdominal aorta.  There is no

## 2021-03-02 NOTE — CONSULTS
Patient seen evaluated full consult , no plan for endoscopy now, will schedule EGD and colonoscopy as put patient after stopping the Xyralto .  Advance the diet , Milalax for constipation

## 2021-03-02 NOTE — CONSULTS
[facility]    [address], [address]  [facility]  3/2/2021 4:19 PM    Pt Name:    Josue Gonzalez  MRN:     [id number]   [id number]  YOB: 1950  Admit Date:    3/2/2021  8:38 AM  Primary Care Physician:  Austin Colbert        Reason for Consult:  BINA    History:   The patient is a 79 y.o. female with hx of DM, Afib, HTN, hypothyroidism, hyperlipidemia, arthritis presented with abdominal pain and concern for bowel blockage. This has apparently been going on since September after she had a pneumothorax after pacemaker placement. Associated with constipation. In ED patient was found to have a lactic acid of 7.8. Serum creatinine was 1.2 with prior creatinine 0.8 so nephrology was consulted. CT abdomen/pelvis showed mild mucosal thickening in small bowel loops, otherwise no acute process. UA was negative except for trace ketones. Denies nsaid use. On metformin at home and Lisinopril.     Past Medical History:  Past Medical History:   Diagnosis Date    Anxiety     Arthritis     Atrial fibrillation (HCC)     Fibromyalgia     GERD (gastroesophageal reflux disease)     Hyperlipidemia     Hypertension     Hypothyroidism     Neuropathy     arms and legs    Osteoporosis     Type 2 diabetes mellitus without complication (HCC)        Past Surgical History:  Past Surgical History:   Procedure Laterality Date    APPENDECTOMY      BLADDER SURGERY      nerve block placed per pt x 3    BLADDER SUSPENSION      CARPAL TUNNEL RELEASE Right     CHOLECYSTECTOMY      COLONOSCOPY  2010, 2013    CYST REMOVAL      HYSTERECTOMY      PACEMAKER INSERTION  09/2020    Dr Jonny Rodriguez GASTROINTESTINAL ENDOSCOPY  [id number]    UPPER GASTROINTESTINAL ENDOSCOPY         Family History:  Family History   Problem Relation Age of Onset    Arthritis Mother    Cheryle Asp / Charlane Ports Mother    Anna Simple Mother 80    Hearing Loss Father     Heart Disease Father     High Blood Pressure Father     Cancer Sister 67        tongue ca'    Esophageal Cancer Brother 61    Diabetes Paternal Aunt     Esophageal Cancer Brother 72    Esophageal Cancer Brother 79    Uterine Cancer Sister        Social History:  Social History     Socioeconomic History    Marital status:      Spouse name: Not on file    Number of children: Not on file    Years of education: Not on file    Highest education level: Not on file   Occupational History    Not on file   Social Needs    Financial resource strain: Not on file    Food insecurity     Worry: Not on file     Inability: Not on file    Transportation needs     Medical: Not on file     Non-medical: Not on file   Tobacco Use    Smoking status: Former Smoker     Packs/day: 0.25     Years: 4.00     Pack years: 1.00     Types: Cigarettes     Quit date: 1989     Years since quittin.1    Smokeless tobacco: Never Used    Tobacco comment: 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   Substance and Sexual Activity    Alcohol use: Yes     Comment: very rarely    Drug use: No    Sexual activity: Not on file   Lifestyle    Physical activity     Days per week: Not on file     Minutes per session: Not on file    Stress: Not on file   Relationships    Social connections     Talks on phone: Not on file     Gets together: Not on file     Attends Yazidi service: Not on file     Active member of club or organization: Not on file     Attends meetings of clubs or organizations: Not on file     Relationship status: Not on file    Intimate partner violence     Fear of current or ex partner: Not on file     Emotionally abused: Not on file     Physically abused: Not on file     Forced sexual activity: Not on file   Other Topics Concern    Not on file   Social History Narrative    Not on file       Home Meds:  Prior to Admission medications    Medication Sig Start Date End Date Taking? Authorizing Provider   gabapentin (NEURONTIN) 600 MG tablet Take 600 mg by mouth 2 times daily. Supposed to be 600mg QID. Patient takes 0.5 tablet BID   Yes Historical Provider, MD VALENZUELA  MG per tablet Take 1 tablet by mouth 2 times daily (with meals) 1/7/21  Yes Galen Ba MD   lisinopril (PRINIVIL;ZESTRIL) 20 MG tablet Take 1 tablet by mouth daily 1/6/21  Yes Galen Ba MD   metoprolol succinate (TOPROL XL) 50 MG extended release tablet Take 1 tablet by mouth daily 1/6/21  Yes Galen Ba MD   amiodarone (CORDARONE) 200 MG tablet Take 1 tablet by mouth daily 1/6/21  Yes Galen Ba MD   rivaroxaban (XARELTO) 20 MG TABS tablet Take 20 mg by mouth daily (with breakfast)    Yes Historical Provider, MD   levothyroxine (SYNTHROID) 50 MCG tablet Take 1 tablet by mouth Daily 12/15/20  Yes Galen Ba MD   lansoprazole (PREVACID) 30 MG delayed release capsule Take 1 capsule by mouth daily 5/19/20  Yes Galen Ba MD   clonazePAM (KLONOPIN) 1 MG tablet Take 1 tablet by mouth 3 times daily as needed for Anxiety for up to 90 days. 1/6/21 4/6/21  MD NICOLAS Puckett  MG per tablet Take 1 tablet by mouth 2 times daily (with meals) 12/7/20   Galen Ba MD   gabapentin (NEURONTIN) 600 MG tablet Take 1 tablet by mouth 4 times daily for 152 days.  One tablet in AM, one tablet at lunch time, 2 tablets at bedtime 5/19/20 12/30/20  Galen Ba MD       Review of Systems:  Constitutional: no fever or chills  Head: No headaches  Eyes: no blurry vision, no discharge  Ears: no ear pain or hearing changes  Nose: no runny nose or epistaxis  Respiratory: no shortness of breath or cough or sputum production  Cardiovascular: no chest pain, no edema  GI: abdominal pain, constipation  : denies any hematuria, no flank pain  Skin: no rash  Musculoskeletal: no joint pain, moves all ext  Neuro: no tremor, no slurred speech  Psychiatric: stable mood, no depression or insomnia    Current Meds:  Infusion:    dextrose      sodium chloride 100 mL/hr at 03/02/21 1527     Meds:    [START ON 3/3/2021] amiodarone  200 mg Oral Daily    [START ON 3/3/2021] pantoprazole  40 mg Oral QAM AC    [START ON 3/3/2021] levothyroxine  50 mcg Oral Daily    [START ON 3/3/2021] metoprolol succinate  50 mg Oral Daily    sodium chloride flush  10 mL Intravenous 2 times per day    polyethylene glycol  17 g Oral Daily    insulin lispro  0-6 Units Subcutaneous Q4H    [START ON 3/3/2021] rivaroxaban  15 mg Oral Daily with breakfast     Meds prn: clonazePAM, glucose, dextrose, glucagon (rDNA), dextrose, sodium chloride flush, promethazine **OR** ondansetron, acetaminophen **OR** acetaminophen, magnesium sulfate, potassium chloride **OR** potassium alternative oral replacement **OR** potassium chloride     Allergies/Intolerances: ALLERGIES: Adhesive tape, Codeine, and Sulfa antibiotics    24HR INTAKE/OUTPUT:      Intake/Output Summary (Last 24 hours) at 3/2/2021 1619  Last data filed at 3/2/2021 1550  Gross per 24 hour   Intake 30 ml   Output --   Net 30 ml     No intake/output data recorded. I/O this shift:  In: 30 [I.V.:30]  Out: -   Admission weight: 145 lb (65.8 kg)  Wt Readings from Last 3 Encounters:   03/02/21 145 lb (65.8 kg)   12/30/20 142 lb (64.4 kg)   05/19/20 143 lb 6.4 oz (65 kg)     Body mass index is 24.89 kg/m². Physical Examination:  VITALS:  BP (!) 120/59   Pulse 63   Temp 97.5 °F (36.4 °C) (Oral)   Resp 18   Ht 5' 4\" (1.626 m)   Wt 145 lb (65.8 kg)   SpO2 95%   BMI 24.89 kg/m²   Weight:   Wt Readings from Last 3 Encounters:   03/02/21 145 lb (65.8 kg)   12/30/20 142 lb (64.4 kg)   05/19/20 143 lb 6.4 oz (65 kg)     Constitutional and General Appearance: awake, alert, no distress  Eyes: no icteric sclera, no pallor conjunctiva, no discharge seen from either eye  Ears and Nose: normal external appearance of left and right ear and nose.   No active drainage from nose. Oral: moist oral mucus membranes  Neck: No jugular venous distention, appears symmetric, good ROM  Lungs: Air entry B/L, no crackles or rales, no use of accessory muscles  Heart: regular rate, S1, S2  Extremities: no LE edema  GI: soft, non-tender, no guarding  Skin: no rash seen on exposed extremities  Musculo: moves all extremities  Neuro: no slurred speech, no facial drooping, no asterixis  Psychiatric: Awake, alert, Oriented    Lab Data  CBC:   Recent Labs     03/02/21 0917   WBC 8.1   HGB 12.0   HCT 38.1        BMP:  Recent Labs     03/02/21 0917      K 4.2      CO2 20*   BUN 24*   CREATININE 1.2   GLUCOSE 241*   CALCIUM 9.1     PTH: @PTH@  TSH:   Recent Labs     03/02/21 0917   TSH 2.020     HgBa1c:   Recent Labs     03/02/21  1322   LABA1C 5.7     Hepatic:   Recent Labs     03/02/21 0917   LABALBU 3.9   AST 14   ALT 12   BILITOT <0.2*   ALKPHOS 41     ABGs: No results found for: PHART, PO2ART, [id number]  Troponin: No results for input(s): TROPONINI in the last 72 hours. BNP: No results for input(s): BNP in the last 72 hours. Old labs reviewed. Impression and Plan:  1. Mild BINA likely from volume depletion, anticipate improvement but she did get IV contrast so will monitor for contrast nephropathy. Hold lisinopril. Hold metformin. Continue IV fluids. Avoid nephrotoxic agents and hypotension. CT scan showed no hydronephrosis. UA is bland  2. Abd pain, constipation  3. Lactic acidosis  4. Afib s/p pacemaker  5. HTN; stable      Thank you for allowing me to participate in the care of this patient. Please feel free to call me if you have any questions.      Electronically signed by Aram Talley DO on 3/2/21 at 4:19 PM EST    [facility]

## 2021-03-02 NOTE — ED NOTES
Patient blood glucose and vitals stable. Patient transferred to 8B room 28 nurse notified.       Lakhwinder Jo  03/02/21 [id number]

## 2021-03-02 NOTE — ED TRIAGE NOTES
Pt presents to ER with abdominal pain that has gotten worse the last few days. Pt has history of bowel blockages and is concerned she has another one. Last bowel movement was yesterday but she states it was a small. Pain is rated 8 out of 10.

## 2021-03-03 VITALS
HEART RATE: 60 BPM | HEIGHT: 64 IN | BODY MASS INDEX: 24.75 KG/M2 | WEIGHT: 145 LBS | OXYGEN SATURATION: 98 % | RESPIRATION RATE: 18 BRPM | TEMPERATURE: 98.2 F | DIASTOLIC BLOOD PRESSURE: 65 MMHG | SYSTOLIC BLOOD PRESSURE: 135 MMHG

## 2021-03-03 LAB
ANION GAP SERPL CALCULATED.3IONS-SCNC: 6 MEQ/L (ref 8–16)
BASOPHILS # BLD: 0.4 %
BASOPHILS ABSOLUTE: 0 THOU/MM3 (ref 0–0.1)
BUN BLDV-MCNC: 23 MG/DL (ref 7–22)
CALCIUM SERPL-MCNC: 8.2 MG/DL (ref 8.5–10.5)
CHLORIDE BLD-SCNC: 106 MEQ/L (ref 98–111)
CO2: 26 MEQ/L (ref 23–33)
CREAT SERPL-MCNC: 1.3 MG/DL (ref 0.4–1.2)
EOSINOPHIL # BLD: 1 %
EOSINOPHILS ABSOLUTE: 0.1 THOU/MM3 (ref 0–0.4)
ERYTHROCYTE [DISTWIDTH] IN BLOOD BY AUTOMATED COUNT: 13.8 % (ref 11.5–14.5)
ERYTHROCYTE [DISTWIDTH] IN BLOOD BY AUTOMATED COUNT: 44.6 FL (ref 35–45)
GFR SERPL CREATININE-BSD FRML MDRD: 40 ML/MIN/1.73M2
GLUCOSE BLD-MCNC: 131 MG/DL (ref 70–108)
GLUCOSE BLD-MCNC: 81 MG/DL (ref 70–108)
HCT VFR BLD CALC: 33.6 % (ref 37–47)
HEMOGLOBIN: 10.7 GM/DL (ref 12–16)
IMMATURE GRANS (ABS): 0.01 THOU/MM3 (ref 0–0.07)
IMMATURE GRANULOCYTES: 0.1 %
LACTIC ACID: 1.9 MMOL/L (ref 0.5–2.2)
LYMPHOCYTES # BLD: 35.9 %
LYMPHOCYTES ABSOLUTE: 2.4 THOU/MM3 (ref 1–4.8)
MCH RBC QN AUTO: 28.4 PG (ref 26–33)
MCHC RBC AUTO-ENTMCNC: 31.8 GM/DL (ref 32.2–35.5)
MCV RBC AUTO: 89.1 FL (ref 81–99)
MONOCYTES # BLD: 8.4 %
MONOCYTES ABSOLUTE: 0.6 THOU/MM3 (ref 0.4–1.3)
NUCLEATED RED BLOOD CELLS: 0 /100 WBC
PHOSPHORUS: 3.6 MG/DL (ref 2.4–4.7)
PLATELET # BLD: 192 THOU/MM3 (ref 130–400)
PMV BLD AUTO: 9.4 FL (ref 9.4–12.4)
POTASSIUM REFLEX MAGNESIUM: 4.5 MEQ/L (ref 3.5–5.2)
RBC # BLD: 3.77 MILL/MM3 (ref 4.2–5.4)
SEG NEUTROPHILS: 54.2 %
SEGMENTED NEUTROPHILS ABSOLUTE COUNT: 3.7 THOU/MM3 (ref 1.8–7.7)
SODIUM BLD-SCNC: 138 MEQ/L (ref 135–145)
TROPONIN T: < 0.01 NG/ML
WBC # BLD: 6.8 THOU/MM3 (ref 4.8–10.8)

## 2021-03-03 PROCEDURE — 6370000000 HC RX 637 (ALT 250 FOR IP): Performed by: INTERNAL MEDICINE

## 2021-03-03 PROCEDURE — 99217 PR OBSERVATION CARE DISCHARGE MANAGEMENT: CPT | Performed by: NURSE PRACTITIONER

## 2021-03-03 PROCEDURE — 84484 ASSAY OF TROPONIN QUANT: CPT

## 2021-03-03 PROCEDURE — 82948 REAGENT STRIP/BLOOD GLUCOSE: CPT

## 2021-03-03 PROCEDURE — 80048 BASIC METABOLIC PNL TOTAL CA: CPT

## 2021-03-03 PROCEDURE — G0378 HOSPITAL OBSERVATION PER HR: HCPCS

## 2021-03-03 PROCEDURE — 84100 ASSAY OF PHOSPHORUS: CPT

## 2021-03-03 PROCEDURE — 85025 COMPLETE CBC W/AUTO DIFF WBC: CPT

## 2021-03-03 PROCEDURE — 83605 ASSAY OF LACTIC ACID: CPT

## 2021-03-03 PROCEDURE — 36415 COLL VENOUS BLD VENIPUNCTURE: CPT

## 2021-03-03 PROCEDURE — 2580000003 HC RX 258: Performed by: INTERNAL MEDICINE

## 2021-03-03 RX ORDER — POLYETHYLENE GLYCOL 3350 17 G/17G
17 POWDER, FOR SOLUTION ORAL DAILY
Qty: 510 G | Refills: 0 | Status: SHIPPED | OUTPATIENT
Start: 2021-03-04 | End: 2021-04-03

## 2021-03-03 RX ORDER — POLYETHYLENE GLYCOL 3350 17 G/17G
17 POWDER, FOR SOLUTION ORAL DAILY
Status: DISCONTINUED | OUTPATIENT
Start: 2021-03-03 | End: 2021-03-03 | Stop reason: HOSPADM

## 2021-03-03 RX ADMIN — METOPROLOL SUCCINATE 50 MG: 50 TABLET, FILM COATED, EXTENDED RELEASE ORAL at 07:45

## 2021-03-03 RX ADMIN — LEVOTHYROXINE SODIUM 50 MCG: 0.05 TABLET ORAL at 06:28

## 2021-03-03 RX ADMIN — RIVAROXABAN 15 MG: 15 TABLET, FILM COATED ORAL at 07:45

## 2021-03-03 RX ADMIN — SODIUM CHLORIDE: 9 INJECTION, SOLUTION INTRAVENOUS at 03:45

## 2021-03-03 RX ADMIN — AMIODARONE HYDROCHLORIDE 200 MG: 200 TABLET ORAL at 07:45

## 2021-03-03 RX ADMIN — PANTOPRAZOLE SODIUM 40 MG: 40 TABLET, DELAYED RELEASE ORAL at 06:28

## 2021-03-03 ASSESSMENT — PAIN DESCRIPTION - LOCATION: LOCATION: ABDOMEN

## 2021-03-03 ASSESSMENT — PAIN DESCRIPTION - ORIENTATION: ORIENTATION: LEFT;ANTERIOR

## 2021-03-03 ASSESSMENT — PAIN - FUNCTIONAL ASSESSMENT: PAIN_FUNCTIONAL_ASSESSMENT: ACTIVITIES ARE NOT PREVENTED

## 2021-03-03 ASSESSMENT — PAIN SCALES - GENERAL
PAINLEVEL_OUTOF10: 4
PAINLEVEL_OUTOF10: 0
PAINLEVEL_OUTOF10: 0

## 2021-03-03 NOTE — CONSULTS
[address] [place] [address]                                  CONSULTATION    PATIENT NAME: Sandra Michael                   :        1950  MED REC NO:   [id number]                           ROOM:       0028  ACCOUNT NO:   [de-identified]                           ADMIT DATE: 2021  PROVIDER:     YVETTE Lua DATE:  2021    HISTORY OF PRESENT ILLNESS:  The patient is known to the practice,  66-year-old female, seen in GI consult for evaluation of abdominal  discomfort, more in the left upper quadrant. The pain is severe. Pain  since 2020. She states she had pacemaker placement with some  complications of pneumothorax on the left side with chest tube  placement. Since then, she is having pain in the left upper quadrant. She said that the pain is not related to GI tract at this time. Does  not exacerbate with meal.  Admits to heartburn. She has no dysphagia or  odynophagia. She has no regurgitation. No loss of weight. Had more  constipation, which she takes MiraLax, doing fine. She has no blood in  the stool, no mucus in the stool. The pain, she said was less when she is stretching herself up, more when  she is bending herself. This is exacerbated with sneezing and deep  cough. More in the left upper quadrant. Pain had been also when she  touches her left flank area at the lower quadrant left ribcage. Certainly exacerbates with cough and sneezing . She has imaging studies  Excess stool in the area. She was given MiraLax, able to move her  bowel. Usually, she is constipated. Appetite is fine at this time. Imaging studies done in the past also showed constipation. She has  constipation with enema with no relief. She has tried having bowel  movement, and she is going to have a colonoscopy as out patient .   She  is due for colonoscopy as her last colonoscopy done was more than five  years ago. PAST MEDICAL HISTORY:  Significant for anxiety, arthritis, AFib,  pacemaker placement, hyperlipidemia, gastric reflux, hypertension,  hypothyroidism, neuropathy - more in lower extremities, osteoporosis,  diabetes. PAST SURGICAL HISTORY:  Significant for appendectomy, bladder surgery,  bladder suspension, carpal tunnel release, cholecystectomy, cyst  removal, hysterectomy, pacemaker placement, left pneumothorax with chest  tube placement after pacemaker placement, upper endoscopy, and  colonoscopy. SOCIAL HISTORY:  Quit smoking more than 30 years ago. No smokeless  tobacco.  Alcohol, not very heavy. No drug abuse. FAMILY HISTORY:  Arthritis, cancer, diabetes, history of cancer in more  than a member in the family, coronary artery disease. ALLERGIES:  ADHESIVE TAPE, SULFA ANTIBIOTIC, and intolerance to CODEINE. REVIEW OF SYSTEMS:  She is having no fever, no chills. Cough is  nonproductive. Constipation, polyuria. No dysuria. Weak lower  extremity arthritis pain. MEDICATIONS:  She is on amiodarone, Protonix, Synthroid, Toprol-XL. PHYSICAL EXAMINATION:  GENERAL:  Pleasant, appeared comfortable at the time of evaluation. VITAL SIGNS:  Blood pressure 104/59, pulse 97, respirations 18,  afebrile. HEENT:  Head is atraumatic. Sclerae anicteric. Pupils are round,  reactive to light and accommodation with normal extraocular muscular  movement. Conjunctivae normal.  Oral cavity, no lesions seen. NECK:  Supple. CHEST:  Good air entry bilaterally with no crepitations, no rales. CARDIOVASCULAR:  S1, S2. No murmur. ABDOMEN:  Soft, nontender. There is no rebound or guarding. No  organomegaly. With deep palpation,  left upper quadrant tenderness. She has more flank tenderness. She has tenderness over left side of the  ribcage. EXTREMITIES:  No clubbing. No cyanosis. LABORATORY DATA:  White blood cell is 8.1, H and H is 12 and 38.1, MCV  is 90.   ALT and AST are normal.  Sodium and potassium are normal.  BUN  and creatinine are normal.  Lactic acid 4.9. Lipase is normal.  INR  2.74. IMAGING STUDY:  CT scan of abdomen and pelvis showed mild mucosal  thickening in the small bowel loop and in the pelvic area status post  cholecystectomy. Granulomatous disease in the spleen. Calcified  granuloma at the left lung base. Pacemaker in place and features of  atherosclerosis. No other acute pathology seen. Other CT scans,  moderate retained stool. Her previous endoscopy findings, her upper endoscopy with dilation done  in 09/2017 due to dysphagia, suppository induced diarrhea. Last colonoscopy done in 2015, right diverticulosis, past history of  polyp. IMPRESSION:  1. Left upper quadrant discomfort, abdominal pain, GI related, can be  related to chest tube placement in the past, scarring of the area. 2.  Gastric reflux, dysphagia, stable at this time. 3.  History of cervical cancer. 4.  History of polyps, due for colonoscopy. She was sent a letter last  seen to recall colonoscopy, did not respond to us. PLAN:  1. MiraLax to help with the constipation. 2.  Upper endoscopy and colonoscopy, both to be done as an outpatient,  possibly proceed with dilations. 3.  Follow up in the GI clinic after discharge.         Li Almonte M.D.    D: 03/03/2021 1:20:37       T: 03/03/2021 4:52:06     [email or web address]  Job#: [id number]     Doc#: [id number]    CC:

## 2021-03-03 NOTE — DISCHARGE SUMMARY
Hospital Medicine Discharge Summary      Patient Identification:   Brandin Mccoy   : 1950  MRN: [id number]   Account: [de-identified]      Patient's PCP: Tania Hunt    Admit Date: 3/2/2021     Discharge Date: 3/3/2021      Admitting Physician: Yvan Lemus DO     Discharge Physician: Jessica Silva, APRN - CNP     Discharge Diagnoses:  1. LUQ abd pain  2. Lactic acidosis, resolved  3. DM, type 2  4. BINA superimposed on CKD  5. Essential HTN  6. Paroxysmal A-fib  7. Elevated troponin, resolved    The patient was seen and examined on day of discharge and this discharge summary is in conjunction with any daily progress note from day of discharge. Hospital Course: Brandin Mccoy is a 79 y.o. female admitted to [address] [place] [address] on 3/2/2021 for nominal pain and constipation. Pt presented to the ED d/t concern for bowel blockage. Pt was admitted in September patient had a pacemaker placed with poor outcome of a pneumothorax requiring chest tube x10 days. Since then pt has had LUQ pain described as soreness, it is gradually worsening exacerbated by bending over and performing certain activities such as sweeping. Her PCP performed abdominal scan 2 months ago showing she was full of stool. Patient endorses constipation w/ BM every 3 days just enough to relieve the pressure. No relief at home w/ enema. Pt admitted w/ GI consult who recommended outpatient EGD/colonoscopy (holding for [address] [place] [address] washout) and Miralax for further treatment. Pt was also consulted to nephrology d/t BINA on CKD, who okay'd to discharge w/ outpatient f/u and discontinuation of lisinopril. Of note, pt troponin in ED was slightly elevated, pt asymptomatic, and serial troponin trended down.         Exam:     Vitals:  Vitals:    21 2030 21 0009 21 0345 21 0738   BP: 118/61 (!) 104/59 118/61 135/65   Pulse: 67 97 67 60   Resp: 18 18 18 18   Temp: 97.7 °F (36.5 °C) 98.4 °F (36.9 °C) 97.7 °F (36.5 °C) 98.2 °F (36.8 °C)   TempSrc: Oral Oral Oral Oral   SpO2: 99% 97% 98% 98%   Weight:       Height:         Weight: Weight: 145 lb (65.8 kg)     24 hour intake/output:    Intake/Output Summary (Last 24 hours) at 3/3/2021 1801  Last data filed at 3/3/2021 0347  Gross per 24 hour   Intake 1168 ml   Output 814 ml   Net 354 ml         General appearance:  No apparent distress, appears stated age and cooperative. HEENT:  Normal cephalic, atraumatic without obvious deformity. Pupils equal, round, and reactive to light. Extra ocular muscles intact. Conjunctivae/corneas clear. Neck: Supple, with full range of motion. No jugular venous distention. Trachea midline. Respiratory:  Unlabored respiratory effort. Clear to auscultation, bilaterally without Rales/Wheezes/Rhonchi. Cardiovascular:  Regular rate and rhythm with S1/S2 without murmurs, rubs or gallops. Abdomen: Soft, LUQ TTP, non-distended with active bowel sounds. Musculoskeletal:  No clubbing, cyanosis or edema bilaterally. Full range of motion without deformity. Skin: Skin pink, non-tenting. No rashes or lesions. Neurologic:  Neurovascularly intact without any focal sensory/motor deficits. Cranial nerves: II-XII intact, grossly non-focal.  Psychiatric:  Alert and oriented, thought content appropriate  Capillary Refill: Brisk,< 3 seconds   Peripheral Pulses: +2 palpable, equal bilaterally       Labs:  For convenience and continuity at follow-up the following most recent labs are provided:      CBC:    Lab Results   Component Value Date    WBC 6.8 03/03/2021    HGB 10.7 03/03/2021    HCT 33.6 03/03/2021     03/03/2021       Renal:    Lab Results   Component Value Date     03/03/2021    K 4.5 03/03/2021     03/03/2021    CO2 26 03/03/2021    BUN 23 03/03/2021    CREATININE 1.3 03/03/2021    CALCIUM 8.2 03/03/2021    PHOS 3.6 03/03/2021         Significant Diagnostic Studies    Radiology:   CT ABDOMEN PELVIS W IV CONTRAST Additional Contrast? Oral   Final Result       1. Mild mucosal thickening in small bowel loops in the pelvis. 2. Status post cholecystectomy and hysterectomy. 3. Evidence for granulomatous disease in the spleen. 4. Small calcified granuloma at the left lung base. 5. Pacemaker in place. 6. Atherosclerotic calcification in the abdominal aorta. 7. Otherwise negative CT scan of the abdomen and pelvis. .               **This report has been created using voice recognition software. It may contain minor errors which are inherent in voice recognition technology. **      Final report electronically signed by DR Kike Haider on 3/2/2021 11:32 AM             Consults:     IP CONSULT TO GI  IP CONSULT TO NEPHROLOGY  IP CONSULT TO GI    Disposition: Home  Condition at Discharge: Stable    Code Status:  Prior     Patient Instructions:    Discharge lab work: BMP in 1 week prior to nephrology appt. Activity: activity as tolerated  Diet: No diet orders on file      Follow-up visits:   Judson Irving  [facility] [facility]  [phone]    Go on 3/15/2021  Post hospital follow-up. Arrive at 10:30am    Jared Garcia DO  [address]  [facility] [address]  [phone]    Go on 3/17/2021  Post hospital follow-up. Arrive at 10:20am    Mario Alberto Martines MD  [address] [place] [address]. [address]   [phone]    Schedule an appointment as soon as possible for a visit  Call and make an appointment, need to be seen for outpatient EGD and colonoscopy. Discharge Medications:      Richard Ayala   Home Medication Instructions IAB:[id number]    Printed on:03/03/21 5349   Medication Information                      amiodarone (CORDARONE) 200 MG tablet  Take 1 tablet by mouth daily             clonazePAM (KLONOPIN) 1 MG tablet  Take 1 tablet by mouth 3 times daily as needed for Anxiety for up to 90 days. gabapentin (NEURONTIN) 600 MG tablet  Take 1 tablet by mouth 4 times daily for 152 days.  One tablet in AM, one tablet at lunch time, 2 tablets at bedtime             gabapentin (NEURONTIN) 600 MG tablet  Take 600 mg by mouth 2 times daily. Supposed to be 600mg QID. Patient takes 0.5 tablet BID             JANUMET  MG per tablet  Take 1 tablet by mouth 2 times daily (with meals)             lansoprazole (PREVACID) 30 MG delayed release capsule  Take 1 capsule by mouth daily             levothyroxine (SYNTHROID) 50 MCG tablet  Take 1 tablet by mouth Daily             metoprolol succinate (TOPROL XL) 50 MG extended release tablet  Take 1 tablet by mouth daily             polyethylene glycol (GLYCOLAX) 17 GM/SCOOP powder  Take 17 g by mouth daily             rivaroxaban (XARELTO) 20 MG TABS tablet  Take 20 mg by mouth daily (with breakfast)                  Time Spent on discharge is more than 32 minutes in the examination, evaluation, counseling and review of medications and discharge plan. Signed: Thank you Nadene Boeck for the opportunity to be involved in this patient's care.     Electronically signed by GUICHO Mahan CNP on 3/3/2021 at 6:01 PM

## 2021-03-03 NOTE — PROGRESS NOTES
Renal Progress Note  [facility]    Patient :  Dimitrios Rodríguez; 79 y.o. MRN# [id number]  Location:  8B-28/028-A  Attending:  Tatum Lewis, *  Admit Date:  3/2/2021   Hospital Day: 0      Subjective:     Nephrology is following the patient for BINA. Patient seen and examined. Still has some abdominal discomfort but feeling better. Had a BM this AM.  She wants to go home. Outpatient Medications:     Medications Prior to Admission: gabapentin (NEURONTIN) 600 MG tablet, Take 600 mg by mouth 2 times daily. Supposed to be 600mg QID. Patient takes 0.5 tablet BID  JANUMET  MG per tablet, Take 1 tablet by mouth 2 times daily (with meals)  lisinopril (PRINIVIL;ZESTRIL) 20 MG tablet, Take 1 tablet by mouth daily  metoprolol succinate (TOPROL XL) 50 MG extended release tablet, Take 1 tablet by mouth daily  amiodarone (CORDARONE) 200 MG tablet, Take 1 tablet by mouth daily  rivaroxaban (XARELTO) 20 MG TABS tablet, Take 20 mg by mouth daily (with breakfast)   levothyroxine (SYNTHROID) 50 MCG tablet, Take 1 tablet by mouth Daily  lansoprazole (PREVACID) 30 MG delayed release capsule, Take 1 capsule by mouth daily  clonazePAM (KLONOPIN) 1 MG tablet, Take 1 tablet by mouth 3 times daily as needed for Anxiety for up to 90 days. [DISCONTINUED] propafenone (RYTHMOL) 150 MG tablet, Take 150 mg by mouth 2 times daily  gabapentin (NEURONTIN) 600 MG tablet, Take 1 tablet by mouth 4 times daily for 152 days.  One tablet in AM, one tablet at lunch time, 2 tablets at bedtime    Current Medications:     Scheduled Meds:    amiodarone  200 mg Oral Daily    pantoprazole  40 mg Oral QAM AC    levothyroxine  50 mcg Oral Daily    metoprolol succinate  50 mg Oral Daily    sodium chloride flush  10 mL Intravenous 2 times per day    insulin lispro  0-6 Units Subcutaneous Q4H    rivaroxaban  15 mg Oral Daily with breakfast     Continuous Infusions:    dextrose      sodium chloride 100 mL/hr at 03/03/21 0345     PRN Meds:  clonazePAM, glucose, dextrose, glucagon (rDNA), dextrose, sodium chloride flush, promethazine **OR** ondansetron, acetaminophen **OR** acetaminophen, magnesium sulfate, potassium chloride **OR** potassium alternative oral replacement **OR** potassium chloride    Input/Output:       I/O last 3 completed shifts: In: 1198 [I.V.:1198]  Out: 814 [Urine:814]. Patient Vitals for the past 96 hrs (Last 3 readings):   Weight   21 0838 145 lb (65.8 kg)       Vital Signs:   Temperature:  Temp: 98.2 °F (36.8 °C)  TMax:   Temp (24hrs), Av.9 °F (36.6 °C), Min:97.4 °F (36.3 °C), Max:98.4 °F (36.9 °C)    Respirations:  Resp: 18  Pulse:   Pulse: 60  BP:    BP: 135/65  BP Range: Systolic (11EZM), NSF:197 , Min:104 , SIS:683       Diastolic (59BGG), BTM:73, Min:51, Max:76      Physical Examination:     General:  Awake, alert, no acute distress  HEENT: NC/AT/ MMM  Chest:               Clear B/L no W/R/R  Cardiac:  S1 S2   Abdomen: Soft, mild tenderness left lower quadrant  Neuro:  No facial droop, No Asterixis  SKIN:  No rashes, good skin turgor. Extremities:  No edema, no cyanosis    Labs:       Recent Labs     21   WBC 8.1 6.8   RBC 4.24 3.77*   HGB 12.0 10.7*   HCT 38.1 33.6*   MCV 89.9 89.1   MCH 28.3 28.4   MCHC 31.5* 31.8*    192   MPV 9.3* 9.4      BMP:   Recent Labs     21    138   K 4.2 4.5    106   CO2 20* 26   BUN 24* 23*   CREATININE 1.2 1.3*   GLUCOSE 241* 81   CALCIUM 9.1 8.2*      Phosphorus:     Recent Labs     21   PHOS 3.6     Magnesium:  No results for input(s): MG in the last 72 hours.   Albumin:    Recent Labs     21  0917   LABALBU 3.9     BNP:      Lab Results   Component Value Date    BNP 46 2020     CHRISTY:    No results found for: CHRISTY  SPEP:  Lab Results   Component Value Date    PROT 7.1 2021     UPEP:   No results found for: LABPE  C3:   No results found for: C3  C4:   No results found for: C4  MPO ANCA:   No results found for: MPO  PR3 ANCA:   No results found for: PR3  Anti-GBM:   No results found for: GBMABIGG  Hep BsAg:       No results found for: HEPBSAG  Hep C AB:        No results found for: HEPCAB    Urinalysis/Chemistries:      Lab Results   Component Value Date    NITRU NEGATIVE 03/02/2021    COLORU YELLOW 03/02/2021    PHUR 5.0 03/02/2021    LABCAST NONE SEEN 11/04/2015    WBCUA 2-4 08/16/2016    RBCUA 0-2 08/16/2016    MUCUS NONE SEEN 11/04/2015    YEAST NONE SEEN 08/16/2016    BACTERIA NONE 08/16/2016    CLARITYU clear 12/30/2020    SPECGRAV 1.030 12/30/2020    SPECGRAV >=1.030 11/04/2015    LEUKOCYTESUR NEGATIVE 03/02/2021    UROBILINOGEN 0.2 03/02/2021    BILIRUBINUR NEGATIVE 03/02/2021    BILIRUBINUR negative 12/30/2020    BLOODU NEGATIVE 03/02/2021    GLUCOSEU NEGATIVE 03/02/2021    KETUA TRACE 03/02/2021    AMORPHOUS URATES 11/04/2015     Urine Sodium:   No results found for: LAURENT  Urine Potassium:  No results found for: KUR  Urine Chloride:  No results found for: CLUR  Urine Osmolarity: No results found for: OSMOU  Urine Protein:   No components found for: TOTALPROTEIN, URINE   Urine Creatinine:     Lab Results   Component Value Date    LABCREA 204.0 08/16/2016     Urine Eosinophils:  No components found for: UEOS        Impression and Plan:  1. BINA, mild likely some volume depletion compounded by contrast nephropathy. She is making good urine output and lytes are stable. If patient is discharged today she can be followed as outpatient. Will hold lisinopril on DC until creatinine back to her baseline. Repeat a bmp in 1 week and I will see her in the office in 2 weeks  2. Lactic acidosis resolved  3. HTN: Bps are stable  4. Abd pain  5. Constipation, improving  6. PAfib        Please don't hesitate to call with any questions.   Electronically signed by Steffany Rojas DO on 3/3/2021 at 7:55 AM

## 2021-03-03 NOTE — PLAN OF CARE
Problem: Falls - Risk of:  Goal: Will remain free from falls  Description: Will remain free from falls  3/3/2021 1013 by Sony Pendleton RN  Outcome: Ongoing  3/3/2021 0609 by Elton Allen RN  Outcome: Ongoing  Note: Fall precaution in place. Bed alarm/chair alarm. Bed locked in lowest position. Fall band applied if applicable. Call light and overhead table within reach. Will continue to monitor. Goal: Absence of physical injury  Description: Absence of physical injury  3/3/2021 1013 by Sony Pendleton RN  Outcome: Ongoing  3/3/2021 0609 by Elton Allen RN  Outcome: Ongoing  Note: Absence of physical injury. Problem: Pain:  Goal: Pain level will decrease  Description: Pain level will decrease  3/3/2021 1013 by Sony Pendleton RN  Outcome: Ongoing  3/3/2021 0609 by Elton Allen RN  Outcome: Ongoing  Note: Pt denies pain on my shift. Non pharmaceutical interventions like ice and repositioning offered before pain medications. Will continue to assess. Goal: Control of acute pain  Description: Control of acute pain  3/3/2021 1013 by Sony Pendleton RN  Outcome: Ongoing  3/3/2021 0609 by Elton Allen RN  Outcome: Ongoing  Note: Pt denies pain on my shift. Non pharmaceutical interventions like ice and repositioning offered before pain medications. Will continue to assess. Goal: Control of chronic pain  Description: Control of chronic pain  3/3/2021 1013 by Sony Pendleton RN  Outcome: Ongoing  3/3/2021 0609 by Elton Allen RN  Outcome: Ongoing  Note: Pt denies pain on my shift. Non pharmaceutical interventions like ice and repositioning offered before pain medications. Will continue to assess. Problem: Activity:  Goal: Risk for activity intolerance will decrease  Description: Risk for activity intolerance will decrease  3/3/2021 1013 by Sony Pendleton RN  Outcome: Ongoing  3/3/2021 0609 by lEton Allen RN  Outcome: Ongoing  Note: Patient indepedent     Problem:  Bowel/Gastric:  Goal: Bowel function will improve  Description: Bowel function will improve  3/3/2021 1013 by Anel Almendarez RN  Outcome: Ongoing  3/3/2021 0609 by Richard White RN  Outcome: Ongoing  Note: Pt able to have a bowel movement. Problem: Fluid Volume:  Goal: Maintenance of adequate hydration will improve  Description: Maintenance of adequate hydration will improve  3/3/2021 1013 by Anel Almendarez RN  Outcome: Ongoing  3/3/2021 0609 by Richard White RN  Outcome: Ongoing  Note: Pt is getting IV maintenance fluids on my shift.

## 2021-03-19 DIAGNOSIS — E03.9 ACQUIRED HYPOTHYROIDISM: ICD-10-CM

## 2021-03-19 RX ORDER — LEVOTHYROXINE SODIUM 0.05 MG/1
50 TABLET ORAL DAILY
Qty: 90 TABLET | Refills: 0 | Status: SHIPPED | OUTPATIENT
Start: 2021-03-19

## 2021-03-19 NOTE — TELEPHONE ENCOUNTER
Leah Gomes called requesting a refill on the following medications:  Requested Prescriptions     Pending Prescriptions Disp Refills    levothyroxine (SYNTHROID) 50 MCG tablet [Pharmacy Med Name: LEVOTHYROXINE 50 MCG TABLET] 90 tablet 0     Sig: Take 1 tablet by mouth Daily       Date of last visit: 12/30/2020  Date of next visit (if applicable):6/28/2021  Date of last refill: 12/15/20  Pharmacy Name: Flor Brooks LPN

## 2021-03-22 NOTE — TELEPHONE ENCOUNTER
Arnold Sureshly calls wondering if she could just come into the building for a pre procedure COVID test. The order is in the computer.

## 2021-03-26 ENCOUNTER — HOSPITAL ENCOUNTER (OUTPATIENT)
Age: 71
Discharge: HOME | End: 2021-03-26
Payer: MEDICARE

## 2021-03-26 DIAGNOSIS — Z79.01 CHRONIC ANTICOAGULATION: ICD-10-CM

## 2021-03-26 DIAGNOSIS — D64.9 NORMOCYTIC NORMOCHROMIC ANEMIA: ICD-10-CM

## 2021-03-26 DIAGNOSIS — K59.09 CHRONIC CONSTIPATION: ICD-10-CM

## 2021-03-26 DIAGNOSIS — Z12.11 ENCOUNTER FOR COLONOSCOPY DUE TO HISTORY OF COLONIC POLYP: ICD-10-CM

## 2021-03-26 DIAGNOSIS — G89.29 CHRONIC LUQ PAIN: ICD-10-CM

## 2021-03-26 DIAGNOSIS — R10.12 CHRONIC LUQ PAIN: ICD-10-CM

## 2021-03-26 DIAGNOSIS — K21.9 GASTROESOPHAGEAL REFLUX DISEASE, UNSPECIFIED WHETHER ESOPHAGITIS PRESENT: ICD-10-CM

## 2021-03-26 DIAGNOSIS — Z86.010 ENCOUNTER FOR COLONOSCOPY DUE TO HISTORY OF COLONIC POLYP: ICD-10-CM

## 2021-03-26 DIAGNOSIS — I48.20 CHRONIC ATRIAL FIBRILLATION (HCC): ICD-10-CM

## 2021-03-26 PROCEDURE — U0003 INFECTIOUS AGENT DETECTION BY NUCLEIC ACID (DNA OR RNA); SEVERE ACUTE RESPIRATORY SYNDROME CORONAVIRUS 2 (SARS-COV-2) (CORONAVIRUS DISEASE [COVID-19]), AMPLIFIED PROBE TECHNIQUE, MAKING USE OF HIGH THROUGHPUT TECHNOLOGIES AS DESCRIBED BY CMS-2020-01-R: HCPCS

## 2021-03-28 LAB — SARS-COV-2: NOT DETECTED

## 2021-04-12 DIAGNOSIS — F41.9 ANXIETY: ICD-10-CM

## 2021-04-12 RX ORDER — LANSOPRAZOLE 30 MG/1
30 CAPSULE, DELAYED RELEASE ORAL DAILY
Qty: 90 CAPSULE | Refills: 1 | Status: SHIPPED | OUTPATIENT
Start: 2021-04-12

## 2021-04-12 RX ORDER — CLONAZEPAM 1 MG/1
1 TABLET ORAL 3 TIMES DAILY PRN
Qty: 270 TABLET | Refills: 0 | Status: SHIPPED | OUTPATIENT
Start: 2021-04-12 | End: 2021-07-11

## 2022-12-31 ENCOUNTER — HOSPITAL ENCOUNTER (INPATIENT)
Age: 72
LOS: 2 days | Discharge: HOME | DRG: 310 | End: 2023-01-02
Attending: INTERNAL MEDICINE | Admitting: INTERNAL MEDICINE
Payer: MEDICARE

## 2022-12-31 ENCOUNTER — APPOINTMENT (OUTPATIENT)
Dept: GENERAL RADIOLOGY | Age: 72
DRG: 310 | End: 2022-12-31
Payer: MEDICARE

## 2022-12-31 DIAGNOSIS — I48.91 ATRIAL FIBRILLATION WITH RVR (HCC): Primary | ICD-10-CM

## 2022-12-31 DIAGNOSIS — E11.65 UNCONTROLLED TYPE 2 DIABETES MELLITUS WITH HYPERGLYCEMIA (HCC): ICD-10-CM

## 2022-12-31 DIAGNOSIS — F41.1 ANXIETY STATE: ICD-10-CM

## 2022-12-31 DIAGNOSIS — R06.02 SHORTNESS OF BREATH: ICD-10-CM

## 2022-12-31 LAB
ALBUMIN SERPL-MCNC: 4 G/DL (ref 3.5–5.1)
ALP BLD-CCNC: 59 U/L (ref 38–126)
ALT SERPL-CCNC: 11 U/L (ref 11–66)
ANION GAP SERPL CALCULATED.3IONS-SCNC: 16 MEQ/L (ref 8–16)
AST SERPL-CCNC: 13 U/L (ref 5–40)
BASOPHILS # BLD: 0.2 %
BASOPHILS ABSOLUTE: 0 THOU/MM3 (ref 0–0.1)
BILIRUB SERPL-MCNC: 0.4 MG/DL (ref 0.3–1.2)
BUN BLDV-MCNC: 23 MG/DL (ref 7–22)
CALCIUM SERPL-MCNC: 9.6 MG/DL (ref 8.5–10.5)
CHLORIDE BLD-SCNC: 99 MEQ/L (ref 98–111)
CO2: 20 MEQ/L (ref 23–33)
CREAT SERPL-MCNC: 1.2 MG/DL (ref 0.4–1.2)
EOSINOPHIL # BLD: 0.4 %
EOSINOPHILS ABSOLUTE: 0.1 THOU/MM3 (ref 0–0.4)
ERYTHROCYTE [DISTWIDTH] IN BLOOD BY AUTOMATED COUNT: 13.7 % (ref 11.5–14.5)
ERYTHROCYTE [DISTWIDTH] IN BLOOD BY AUTOMATED COUNT: 43.3 FL (ref 35–45)
GFR SERPL CREATININE-BSD FRML MDRD: 48 ML/MIN/1.73M2
GLUCOSE BLD-MCNC: 298 MG/DL (ref 70–108)
GLUCOSE BLD-MCNC: 513 MG/DL (ref 70–108)
HCT VFR BLD CALC: 41.6 % (ref 37–47)
HEMOGLOBIN: 13.5 GM/DL (ref 12–16)
IMMATURE GRANS (ABS): 0.09 THOU/MM3 (ref 0–0.07)
IMMATURE GRANULOCYTES: 0.6 %
INFLUENZA A: NOT DETECTED
INFLUENZA B: NOT DETECTED
LYMPHOCYTES # BLD: 24.7 %
LYMPHOCYTES ABSOLUTE: 3.9 THOU/MM3 (ref 1–4.8)
MAGNESIUM: 2 MG/DL (ref 1.6–2.4)
MCH RBC QN AUTO: 28 PG (ref 26–33)
MCHC RBC AUTO-ENTMCNC: 32.5 GM/DL (ref 32.2–35.5)
MCV RBC AUTO: 86.3 FL (ref 81–99)
MONOCYTES # BLD: 5.9 %
MONOCYTES ABSOLUTE: 0.9 THOU/MM3 (ref 0.4–1.3)
NUCLEATED RED BLOOD CELLS: 0 /100 WBC
OSMOLALITY CALCULATION: 296.8 MOSMOL/KG (ref 275–300)
PLATELET # BLD: 328 THOU/MM3 (ref 130–400)
PMV BLD AUTO: 9.5 FL (ref 9.4–12.4)
POTASSIUM SERPL-SCNC: 4.4 MEQ/L (ref 3.5–5.2)
PRO-BNP: 349.7 PG/ML (ref 0–124)
RBC # BLD: 4.82 MILL/MM3 (ref 4.2–5.4)
SARS-COV-2 RNA, RT PCR: NOT DETECTED
SEG NEUTROPHILS: 68.2 %
SEGMENTED NEUTROPHILS ABSOLUTE COUNT: 10.7 THOU/MM3 (ref 1.8–7.7)
SODIUM BLD-SCNC: 135 MEQ/L (ref 135–145)
TOTAL PROTEIN: 7.4 G/DL (ref 6.1–8)
TROPONIN T: < 0.01 NG/ML
TSH SERPL DL<=0.05 MIU/L-ACNC: 2.4 UIU/ML (ref 0.4–4.2)
WBC # BLD: 15.7 THOU/MM3 (ref 4.8–10.8)

## 2022-12-31 PROCEDURE — 84443 ASSAY THYROID STIM HORMONE: CPT

## 2022-12-31 PROCEDURE — 99285 EMERGENCY DEPT VISIT HI MDM: CPT

## 2022-12-31 PROCEDURE — 80053 COMPREHEN METABOLIC PANEL: CPT

## 2022-12-31 PROCEDURE — 87636 SARSCOV2 & INF A&B AMP PRB: CPT

## 2022-12-31 PROCEDURE — 96365 THER/PROPH/DIAG IV INF INIT: CPT

## 2022-12-31 PROCEDURE — 82948 REAGENT STRIP/BLOOD GLUCOSE: CPT

## 2022-12-31 PROCEDURE — 2580000003 HC RX 258: Performed by: PHYSICIAN ASSISTANT

## 2022-12-31 PROCEDURE — 71045 X-RAY EXAM CHEST 1 VIEW: CPT

## 2022-12-31 PROCEDURE — 84484 ASSAY OF TROPONIN QUANT: CPT

## 2022-12-31 PROCEDURE — 2500000003 HC RX 250 WO HCPCS: Performed by: PHYSICIAN ASSISTANT

## 2022-12-31 PROCEDURE — 85025 COMPLETE CBC W/AUTO DIFF WBC: CPT

## 2022-12-31 PROCEDURE — 2140000000 HC CCU INTERMEDIATE R&B

## 2022-12-31 PROCEDURE — 83735 ASSAY OF MAGNESIUM: CPT

## 2022-12-31 PROCEDURE — 83880 ASSAY OF NATRIURETIC PEPTIDE: CPT

## 2022-12-31 PROCEDURE — 36415 COLL VENOUS BLD VENIPUNCTURE: CPT

## 2022-12-31 PROCEDURE — 93005 ELECTROCARDIOGRAM TRACING: CPT | Performed by: PHYSICIAN ASSISTANT

## 2022-12-31 RX ORDER — ACETAMINOPHEN 650 MG/1
650 SUPPOSITORY RECTAL EVERY 6 HOURS PRN
Status: DISCONTINUED | OUTPATIENT
Start: 2022-12-31 | End: 2023-01-02 | Stop reason: HOSPADM

## 2022-12-31 RX ORDER — METOPROLOL SUCCINATE 50 MG/1
50 TABLET, EXTENDED RELEASE ORAL DAILY
Status: DISCONTINUED | OUTPATIENT
Start: 2023-01-01 | End: 2023-01-01

## 2022-12-31 RX ORDER — SODIUM CHLORIDE 9 MG/ML
INJECTION, SOLUTION INTRAVENOUS PRN
Status: DISCONTINUED | OUTPATIENT
Start: 2022-12-31 | End: 2023-01-02 | Stop reason: HOSPADM

## 2022-12-31 RX ORDER — POLYETHYLENE GLYCOL 3350 17 G/17G
17 POWDER, FOR SOLUTION ORAL DAILY PRN
Status: DISCONTINUED | OUTPATIENT
Start: 2022-12-31 | End: 2023-01-02 | Stop reason: HOSPADM

## 2022-12-31 RX ORDER — SODIUM CHLORIDE 0.9 % (FLUSH) 0.9 %
5-40 SYRINGE (ML) INJECTION EVERY 12 HOURS SCHEDULED
Status: DISCONTINUED | OUTPATIENT
Start: 2022-12-31 | End: 2023-01-02 | Stop reason: HOSPADM

## 2022-12-31 RX ORDER — ONDANSETRON 4 MG/1
4 TABLET, ORALLY DISINTEGRATING ORAL EVERY 8 HOURS PRN
Status: DISCONTINUED | OUTPATIENT
Start: 2022-12-31 | End: 2023-01-02 | Stop reason: HOSPADM

## 2022-12-31 RX ORDER — SODIUM CHLORIDE 0.9 % (FLUSH) 0.9 %
5-40 SYRINGE (ML) INJECTION PRN
Status: DISCONTINUED | OUTPATIENT
Start: 2022-12-31 | End: 2023-01-02 | Stop reason: HOSPADM

## 2022-12-31 RX ORDER — ACETAMINOPHEN 325 MG/1
650 TABLET ORAL EVERY 6 HOURS PRN
Status: DISCONTINUED | OUTPATIENT
Start: 2022-12-31 | End: 2023-01-02 | Stop reason: HOSPADM

## 2022-12-31 RX ORDER — LEVOTHYROXINE SODIUM 0.05 MG/1
50 TABLET ORAL DAILY
Status: DISCONTINUED | OUTPATIENT
Start: 2023-01-01 | End: 2023-01-02 | Stop reason: HOSPADM

## 2022-12-31 RX ORDER — ONDANSETRON 2 MG/ML
4 INJECTION INTRAMUSCULAR; INTRAVENOUS EVERY 6 HOURS PRN
Status: DISCONTINUED | OUTPATIENT
Start: 2022-12-31 | End: 2023-01-02 | Stop reason: HOSPADM

## 2022-12-31 RX ORDER — GABAPENTIN 600 MG/1
600 TABLET ORAL 2 TIMES DAILY
Status: DISCONTINUED | OUTPATIENT
Start: 2023-01-01 | End: 2023-01-01 | Stop reason: SDUPTHER

## 2022-12-31 RX ORDER — DILTIAZEM HYDROCHLORIDE 5 MG/ML
10 INJECTION INTRAVENOUS ONCE
Status: COMPLETED | OUTPATIENT
Start: 2022-12-31 | End: 2022-12-31

## 2022-12-31 RX ORDER — PANTOPRAZOLE SODIUM 40 MG/1
40 TABLET, DELAYED RELEASE ORAL
Status: DISCONTINUED | OUTPATIENT
Start: 2023-01-01 | End: 2023-01-02 | Stop reason: HOSPADM

## 2022-12-31 RX ADMIN — DILTIAZEM HYDROCHLORIDE 10 MG: 5 INJECTION INTRAVENOUS at 19:54

## 2022-12-31 RX ADMIN — DILTIAZEM HYDROCHLORIDE 5 MG/HR: 5 INJECTION, SOLUTION INTRAVENOUS at 20:00

## 2022-12-31 ASSESSMENT — PAIN - FUNCTIONAL ASSESSMENT
PAIN_FUNCTIONAL_ASSESSMENT: NONE - DENIES PAIN
PAIN_FUNCTIONAL_ASSESSMENT: NONE - DENIES PAIN

## 2022-12-31 ASSESSMENT — LIFESTYLE VARIABLES
HOW MANY STANDARD DRINKS CONTAINING ALCOHOL DO YOU HAVE ON A TYPICAL DAY: PATIENT DOES NOT DRINK
HOW OFTEN DO YOU HAVE A DRINK CONTAINING ALCOHOL: NEVER

## 2023-01-01 PROBLEM — E11.65 UNCONTROLLED TYPE 2 DIABETES MELLITUS WITH HYPERGLYCEMIA (HCC): Status: ACTIVE | Noted: 2023-01-01

## 2023-01-01 LAB
ANION GAP SERPL CALCULATED.3IONS-SCNC: 9 MEQ/L (ref 8–16)
AVERAGE GLUCOSE: 201 MG/DL (ref 70–126)
BASOPHILS # BLD: 0.3 %
BASOPHILS ABSOLUTE: 0 THOU/MM3 (ref 0–0.1)
BUN BLDV-MCNC: 19 MG/DL (ref 7–22)
CALCIUM SERPL-MCNC: 9.2 MG/DL (ref 8.5–10.5)
CHLORIDE BLD-SCNC: 103 MEQ/L (ref 98–111)
CO2: 26 MEQ/L (ref 23–33)
CREAT SERPL-MCNC: 0.7 MG/DL (ref 0.4–1.2)
EKG ATRIAL RATE: 80 BPM
EKG P AXIS: 44 DEGREES
EKG P-R INTERVAL: 204 MS
EKG Q-T INTERVAL: 272 MS
EKG Q-T INTERVAL: 360 MS
EKG QRS DURATION: 64 MS
EKG QRS DURATION: 72 MS
EKG QTC CALCULATION (BAZETT): 415 MS
EKG QTC CALCULATION (BAZETT): 456 MS
EKG R AXIS: 21 DEGREES
EKG R AXIS: 78 DEGREES
EKG T AXIS: 50 DEGREES
EKG T AXIS: 88 DEGREES
EKG VENTRICULAR RATE: 169 BPM
EKG VENTRICULAR RATE: 80 BPM
EOSINOPHIL # BLD: 0.6 %
EOSINOPHILS ABSOLUTE: 0.1 THOU/MM3 (ref 0–0.4)
ERYTHROCYTE [DISTWIDTH] IN BLOOD BY AUTOMATED COUNT: 13.6 % (ref 11.5–14.5)
ERYTHROCYTE [DISTWIDTH] IN BLOOD BY AUTOMATED COUNT: 42.3 FL (ref 35–45)
GFR SERPL CREATININE-BSD FRML MDRD: > 60 ML/MIN/1.73M2
GLUCOSE BLD-MCNC: 116 MG/DL (ref 70–108)
GLUCOSE BLD-MCNC: 176 MG/DL (ref 70–108)
GLUCOSE BLD-MCNC: 193 MG/DL (ref 70–108)
GLUCOSE BLD-MCNC: 319 MG/DL (ref 70–108)
HBA1C MFR BLD: 8.7 % (ref 4.4–6.4)
HCT VFR BLD CALC: 33.3 % (ref 37–47)
HEMOGLOBIN: 11 GM/DL (ref 12–16)
IMMATURE GRANS (ABS): 0.06 THOU/MM3 (ref 0–0.07)
IMMATURE GRANULOCYTES: 0.5 %
LYMPHOCYTES # BLD: 27.3 %
LYMPHOCYTES ABSOLUTE: 3.2 THOU/MM3 (ref 1–4.8)
MCH RBC QN AUTO: 28 PG (ref 26–33)
MCHC RBC AUTO-ENTMCNC: 33 GM/DL (ref 32.2–35.5)
MCV RBC AUTO: 84.7 FL (ref 81–99)
MONOCYTES # BLD: 6.2 %
MONOCYTES ABSOLUTE: 0.7 THOU/MM3 (ref 0.4–1.3)
NUCLEATED RED BLOOD CELLS: 0 /100 WBC
OSMOLALITY CALCULATION: 283.2 MOSMOL/KG (ref 275–300)
PLATELET # BLD: 235 THOU/MM3 (ref 130–400)
PMV BLD AUTO: 9.2 FL (ref 9.4–12.4)
POTASSIUM REFLEX MAGNESIUM: 4.5 MEQ/L (ref 3.5–5.2)
RBC # BLD: 3.93 MILL/MM3 (ref 4.2–5.4)
SEG NEUTROPHILS: 65.1 %
SEGMENTED NEUTROPHILS ABSOLUTE COUNT: 7.7 THOU/MM3 (ref 1.8–7.7)
SODIUM BLD-SCNC: 138 MEQ/L (ref 135–145)
WBC # BLD: 11.9 THOU/MM3 (ref 4.8–10.8)

## 2023-01-01 PROCEDURE — 80048 BASIC METABOLIC PNL TOTAL CA: CPT

## 2023-01-01 PROCEDURE — 6370000000 HC RX 637 (ALT 250 FOR IP): Performed by: PHYSICIAN ASSISTANT

## 2023-01-01 PROCEDURE — 85025 COMPLETE CBC W/AUTO DIFF WBC: CPT

## 2023-01-01 PROCEDURE — 2580000003 HC RX 258: Performed by: STUDENT IN AN ORGANIZED HEALTH CARE EDUCATION/TRAINING PROGRAM

## 2023-01-01 PROCEDURE — 6370000000 HC RX 637 (ALT 250 FOR IP): Performed by: STUDENT IN AN ORGANIZED HEALTH CARE EDUCATION/TRAINING PROGRAM

## 2023-01-01 PROCEDURE — 82948 REAGENT STRIP/BLOOD GLUCOSE: CPT

## 2023-01-01 PROCEDURE — 2140000000 HC CCU INTERMEDIATE R&B

## 2023-01-01 PROCEDURE — 83036 HEMOGLOBIN GLYCOSYLATED A1C: CPT

## 2023-01-01 PROCEDURE — 36415 COLL VENOUS BLD VENIPUNCTURE: CPT

## 2023-01-01 PROCEDURE — 6370000000 HC RX 637 (ALT 250 FOR IP): Performed by: INTERNAL MEDICINE

## 2023-01-01 RX ORDER — GABAPENTIN 300 MG/1
600 CAPSULE ORAL 2 TIMES DAILY
Status: DISCONTINUED | OUTPATIENT
Start: 2023-01-01 | End: 2023-01-02 | Stop reason: HOSPADM

## 2023-01-01 RX ORDER — GABAPENTIN 400 MG/1
400 CAPSULE ORAL
Status: DISCONTINUED | OUTPATIENT
Start: 2023-01-01 | End: 2023-01-02 | Stop reason: HOSPADM

## 2023-01-01 RX ORDER — SODIUM CHLORIDE 9 MG/ML
INJECTION, SOLUTION INTRAVENOUS CONTINUOUS
Status: DISCONTINUED | OUTPATIENT
Start: 2023-01-01 | End: 2023-01-01

## 2023-01-01 RX ORDER — METOPROLOL SUCCINATE 50 MG/1
50 TABLET, EXTENDED RELEASE ORAL 2 TIMES DAILY
Status: DISCONTINUED | OUTPATIENT
Start: 2023-01-01 | End: 2023-01-02 | Stop reason: HOSPADM

## 2023-01-01 RX ORDER — GABAPENTIN 400 MG/1
1200 CAPSULE ORAL
Status: DISCONTINUED | OUTPATIENT
Start: 2023-01-01 | End: 2023-01-01

## 2023-01-01 RX ORDER — ALOGLIPTIN 12.5 MG/1
12.5 TABLET, FILM COATED ORAL DAILY
Status: DISCONTINUED | OUTPATIENT
Start: 2023-01-01 | End: 2023-01-02 | Stop reason: HOSPADM

## 2023-01-01 RX ORDER — INSULIN LISPRO 100 [IU]/ML
0-8 INJECTION, SOLUTION INTRAVENOUS; SUBCUTANEOUS
Status: DISCONTINUED | OUTPATIENT
Start: 2023-01-01 | End: 2023-01-02 | Stop reason: HOSPADM

## 2023-01-01 RX ORDER — DEXTROSE MONOHYDRATE 100 MG/ML
INJECTION, SOLUTION INTRAVENOUS CONTINUOUS PRN
Status: DISCONTINUED | OUTPATIENT
Start: 2023-01-01 | End: 2023-01-02 | Stop reason: HOSPADM

## 2023-01-01 RX ORDER — GABAPENTIN 400 MG/1
400 CAPSULE ORAL
Status: DISCONTINUED | OUTPATIENT
Start: 2023-01-02 | End: 2023-01-01

## 2023-01-01 RX ORDER — INSULIN LISPRO 100 [IU]/ML
0-4 INJECTION, SOLUTION INTRAVENOUS; SUBCUTANEOUS NIGHTLY
Status: DISCONTINUED | OUTPATIENT
Start: 2023-01-01 | End: 2023-01-02 | Stop reason: HOSPADM

## 2023-01-01 RX ADMIN — SODIUM CHLORIDE, PRESERVATIVE FREE 10 ML: 5 INJECTION INTRAVENOUS at 09:43

## 2023-01-01 RX ADMIN — RIVAROXABAN 15 MG: 15 TABLET, FILM COATED ORAL at 09:43

## 2023-01-01 RX ADMIN — LEVOTHYROXINE SODIUM 50 MCG: 0.05 TABLET ORAL at 05:47

## 2023-01-01 RX ADMIN — METOPROLOL SUCCINATE 50 MG: 50 TABLET, EXTENDED RELEASE ORAL at 09:43

## 2023-01-01 RX ADMIN — SODIUM CHLORIDE: 9 INJECTION, SOLUTION INTRAVENOUS at 04:49

## 2023-01-01 RX ADMIN — GABAPENTIN 600 MG: 600 TABLET, FILM COATED ORAL at 09:42

## 2023-01-01 RX ADMIN — METFORMIN HYDROCHLORIDE 1000 MG: 500 TABLET ORAL at 17:31

## 2023-01-01 RX ADMIN — SODIUM CHLORIDE, PRESERVATIVE FREE 10 ML: 5 INJECTION INTRAVENOUS at 20:30

## 2023-01-01 RX ADMIN — INSULIN LISPRO 6 UNITS: 100 INJECTION, SOLUTION INTRAVENOUS; SUBCUTANEOUS at 12:33

## 2023-01-01 RX ADMIN — METFORMIN HYDROCHLORIDE 1000 MG: 500 TABLET ORAL at 12:33

## 2023-01-01 RX ADMIN — GABAPENTIN 400 MG: 400 CAPSULE ORAL at 22:57

## 2023-01-01 RX ADMIN — PANTOPRAZOLE SODIUM 40 MG: 40 TABLET, DELAYED RELEASE ORAL at 05:47

## 2023-01-01 RX ADMIN — ALOGLIPTIN 12.5 MG: 12.5 TABLET, FILM COATED ORAL at 12:33

## 2023-01-01 RX ADMIN — METOPROLOL SUCCINATE 50 MG: 50 TABLET, EXTENDED RELEASE ORAL at 20:29

## 2023-01-01 ASSESSMENT — PAIN SCALES - GENERAL: PAINLEVEL_OUTOF10: 0

## 2023-01-01 NOTE — ED NOTES
ED to inpatient nurses report    Chief Complaint   Patient presents with    Anxiety    Depression      Present to ED from home  LOC: alert and orientated to name, place, date  Vital signs   Vitals:    12/31/22 1850 12/31/22 1941 12/31/22 2040 12/31/22 2105   BP: (!) 136/99 (!) 121/90 (!) 109/90 129/71   Pulse: (!) 123 (!) 129 (!) 112 (!) 103   Resp: 18 18 18 17   Temp: 97.7 °F (36.5 °C)      TempSrc: Oral      SpO2: 99% 98% 99% 98%      Oxygen Baseline 0L    Current needs required none Bipap/Cpap No  LDAs:   Peripheral IV 12/31/22 Right Antecubital (Active)     Mobility: Independent  Pending ED orders: none  Present condition: stable    C-SSRS Risk of Suicide: Low Risk    Preferred Language: [place]     Electronically signed by Luther Green RN on 12/31/2022 at 9:06 PM       Luther Green RN  12/31/22 2106

## 2023-01-01 NOTE — ED PROVIDER NOTES
[facility] EMERGENCY DEPT      CHIEF COMPLAINT       Chief Complaint   Patient presents with    Anxiety    Depression       Nurses Notes reviewed and I agree except as noted in the HPI. HISTORY OF PRESENT ILLNESS    Giuseppe Ureña is a 67 y.o. female who presents for palpitations. Patient reports a history of A. fib. For the past few nights patient has been experiencing palpitations with chest pressure, dyspnea, lightheadedness, and anxiety. Symptoms were more intense tonight. After she had dinner with a friend, she had her friend feel her pulse, which her friend felt it was very fast.  Patient felt \"panicky. \"  Because her symptoms were more frequent and more intense, patient decided to come to the ER. Patient's cardiologist is through [facility], however she has not been happy with her care. In October 2020 patient received a pacemaker as she was told her heart was stopping. This was after she wore 2 heart monitors. About a year ago patient received a COVID-vaccine and started to experience headaches and unsteady gait. She saw a neurologist who spoke with her cardiologist about her amiodarone. Her amiodarone was stopped due to a possible etiology of her symptoms. Her symptoms did not improve however she was never put back on her amiodarone. Currently patient takes metoprolol XL and Xarelto. Patient denies fever, recent illness, URI symptoms, UTI symptoms, or other complaints. REVIEW OF SYSTEMS     Review of Systems   Constitutional:  Negative for appetite change, chills, diaphoresis and fever. HENT:  Negative for congestion, rhinorrhea and sore throat. Eyes:  Negative for photophobia and visual disturbance. Respiratory:  Positive for chest tightness (Pressure) and shortness of breath. Negative for cough. Cardiovascular:  Positive for palpitations. Negative for chest pain. Gastrointestinal:  Negative for abdominal pain, diarrhea, nausea and vomiting.    Genitourinary: Negative for decreased urine volume, difficulty urinating, dysuria and frequency. Musculoskeletal:  Negative for gait problem. Skin:  Negative for rash. Neurological:  Positive for weakness and light-headedness. Negative for dizziness and numbness. Psychiatric/Behavioral:  Positive for dysphoric mood. Negative for confusion and suicidal ideas. The patient is nervous/anxious. PAST MEDICAL HISTORY    has a past medical history of Anxiety, Arthritis, Atrial fibrillation (HCC), Fibromyalgia, GERD (gastroesophageal reflux disease), Hyperlipidemia, Hypertension, Hypothyroidism, Neuropathy, Osteoporosis, and Type 2 diabetes mellitus without complication ([facility] [address] SURGICAL HISTORY      has a past surgical history that includes Hysterectomy; cyst removal; Cholecystectomy; Appendectomy; bladder suspension; Colonoscopy (2010, 2013); Tubal ligation; Carpal tunnel release (Right); Bladder surgery; Upper gastrointestinal endoscopy (6546,5712); Upper gastrointestinal endoscopy; and Pacemaker insertion (09/2020). CURRENT MEDICATIONS       Previous Medications    AMIODARONE (CORDARONE) 200 MG TABLET    Take 1 tablet by mouth daily    CLONAZEPAM (KLONOPIN) 1 MG TABLET    Take 1 tablet by mouth 3 times daily as needed for Anxiety for up to 90 days. GABAPENTIN (NEURONTIN) 600 MG TABLET    Take 1 tablet by mouth 4 times daily for 152 days. One tablet in AM, one tablet at lunch time, 2 tablets at bedtime    GABAPENTIN (NEURONTIN) 600 MG TABLET    Take 600 mg by mouth 2 times daily. Supposed to be 600mg QID.   Patient takes 0.5 tablet BID    JANUMET  MG PER TABLET    Take 1 tablet by mouth 2 times daily (with meals)    LANSOPRAZOLE (PREVACID) 30 MG DELAYED RELEASE CAPSULE    Take 1 capsule by mouth daily    LEVOTHYROXINE (SYNTHROID) 50 MCG TABLET    Take 1 tablet by mouth Daily    METOPROLOL SUCCINATE (TOPROL XL) 50 MG EXTENDED RELEASE TABLET    Take 1 tablet by mouth daily    RIVAROXABAN (XARELTO) 20 MG TABS TABLET    Take 1 tablet by mouth daily (with breakfast)       ALLERGIES     is allergic to adhesive tape, codeine, and sulfa antibiotics. FAMILY HISTORY     She indicated that her mother is . She indicated that her father is . She indicated that only one of her three sisters is alive. She indicated that all of her three brothers are . She indicated that the status of her paternal aunt is unknown.   family history includes Arthritis in her mother; Cancer (age of onset: 67) in her sister; Diabetes in her paternal aunt; Hearing Loss in her father; Heart Disease in her father; High Blood Pressure in her father; Lung Cancer (age of onset: 80) in her mother; Clarance [place] in her mother; Other in her brother, brother, and brother; Aracelis Fix in her sister; Uterine Cancer in her sister. SOCIAL HISTORY    reports that she quit smoking about 34 years ago. Her smoking use included cigarettes. She has a 1.00 pack-year smoking history. She has never used smokeless tobacco. She reports current alcohol use. She reports that she does not use drugs. PHYSICAL EXAM     INITIAL VITALS:  oral temperature is 97.7 °F (36.5 °C). Her blood pressure is 121/90 (abnormal) and her pulse is 129 (abnormal). Her respiration is 18 and oxygen saturation is 98%. Physical Exam  Constitutional:       Appearance: Normal appearance. She is well-developed. She is not ill-appearing or diaphoretic. HENT:      Head: Normocephalic and atraumatic. Right Ear: Hearing normal.      Left Ear: Hearing normal.      Nose: Nose normal. No rhinorrhea. Mouth/Throat:      Lips: Pink. Mouth: Mucous membranes are moist.      Pharynx: Oropharynx is clear. Eyes:      General: Lids are normal. No scleral icterus. Extraocular Movements: Extraocular movements intact. Conjunctiva/sclera: Conjunctivae normal.      Pupils: Pupils are equal, round, and reactive to light.    Neck:      Trachea: Trachea normal.   Cardiovascular:      Rate and Rhythm: Tachycardia present. Rhythm irregularly irregular. Heart sounds: Normal heart sounds. No murmur heard. Pulmonary:      Effort: Pulmonary effort is normal.      Breath sounds: Normal breath sounds and air entry. No decreased breath sounds, wheezing or rhonchi. Abdominal:      General: There is no distension. Palpations: Abdomen is soft. Tenderness: There is no abdominal tenderness. Musculoskeletal:      Cervical back: Normal range of motion and neck supple. No rigidity. Comments: Well perfused; movement normal as observed; no signs of DVT   Lymphadenopathy:      Cervical: No cervical adenopathy. Skin:     General: Skin is warm and dry. Findings: No rash. Neurological:      General: No focal deficit present. Mental Status: She is alert. Sensory: Sensation is intact. Motor: Motor function is intact. Gait: Gait is intact. Psychiatric:         Mood and Affect: Mood is anxious. Speech: Speech normal.         Behavior: Behavior is cooperative. Thought Content: Thought content normal. Thought content does not include suicidal ideation. DIFFERENTIAL DIAGNOSIS:   Including but not limited to: A. fib with RVR, anxiety, electrolyte abnormality, UTI, COVID infection, CHF, ischemia    DIAGNOSTIC RESULTS     EKG: All EKG's are [facility] Phys[facility] who either signs or Co-signs this chart in the absence of a cardiologist.  Ventricular rate 169 bpm  QRS duration 72 ms  QTc interval 456 ms  P-R-T axes  *, 78, 88  Atrial fibrillation with RVR. Nonspecific ST abnormality. No STEMI    RADIOLOGY: non-plain film images(s) such as CT,Ultrasound and MRI are read by the radiologist.  Plain radiographic images are visualized and preliminarily interpreted by the emergency physician unless otherwise stated below. XR CHEST PORTABLE   Final Result   1. Normal heart size.  Permanent dual-chamber pacemaker. Evidence for old, healed granulomatous disease. 2. No acute findings. No infiltrates or effusions are seen            **This report has been created using voice recognition software. It may contain minor errors which are inherent in voice recognition technology. **      Final report electronically signed by Dr. Milka Beltre on 12/31/2022 7:35 PM          LABS:   Labs Reviewed   CBC WITH AUTO DIFFERENTIAL - Abnormal; Notable for the following components:       Result Value    WBC 15.7 (*)     Segs Absolute 10.7 (*)     Immature Grans (Abs) 0.09 (*)     All other components within normal limits   COMPREHENSIVE METABOLIC PANEL - Abnormal; Notable for the following components:    Glucose 513 (*)     BUN 23 (*)     CO2 20 (*)     All other components within normal limits   BRAIN NATRIURETIC PEPTIDE - Abnormal; Notable for the following components:    Pro-.7 (*)     All other components within normal limits   GLOMERULAR FILTRATION RATE, ESTIMATED - Abnormal; Notable for the following components:    Est, Glom Filt Rate 48 (*)     All other components within normal limits   COVID-19 & INFLUENZA COMBO   TROPONIN   TSH WITH REFLEX   MAGNESIUM   ANION GAP   OSMOLALITY   URINALYSIS WITH REFLEX TO CULTURE       EMERGENCY DEPARTMENT COURSE:   Vitals:    Vitals:    12/31/22 1850 12/31/22 1941   BP: (!) 136/99 (!) 121/90   Pulse: (!) 123 (!) 129   Resp: 18 18   Temp: 97.7 °F (36.5 °C)    TempSrc: Oral    SpO2: 99% 98%       MDM:  The patient was seen by me in the emergency room for palpitations, chest pressure, dyspnea, and anxiety. Physical exam revealed a pleasant but anxious 66-year-old female with an irregular tachycardia heart rhythm. Lungs were clear to auscultation bilaterally and patient was in no distress. Vital signs reviewed and noted stable although tachycardic. Old records were reviewed. Appropriate laboratory and imaging studies were ordered and reviewed upon completion.     Pertinent findings: Blood sugar 513, GFR 48, EKG showing A. fib with RVR at 169    Interventions: Cardizem 10 mg bolus, Cardizem drip. I discussed this patient with my attending physician, Dr. Marcela Silverman, who aided in medical decision making. Reexamination: The patient has been observed. The patient remained stable in the ER with no respiratory distress noted. On reexam, respiratory effort remained normal.  The patient was nontoxic appearing with good vital signs. Patient stated she felt improved. Results were discussed with the patient as well as desire for admission and they were amenable. Yamel Aviles PA-C of our hospitalists' service was consulted and graciously accepted admission. The patient was admitted to the hospital in fair condition. CRITICAL CARE:   During my workup of this patient, it did become clear to me the critical nature of this patient's illness which did require my constant attention, and a critical care time 30 minutes was given    CONSULTS:  2027: Yamel Aviles PA-C (hospitalist)    PROCEDURES:  None    FINAL IMPRESSION      1. Atrial fibrillation with RVR ([address]    2. Uncontrolled type 2 diabetes mellitus with hyperglycemia ([address]    3. Anxiety state          DISPOSITION/PLAN     1. Atrial fibrillation with RVR ([address]    2. Uncontrolled type 2 diabetes mellitus with hyperglycemia ([address]    3.  Anxiety state    Admission      (Please note that portions of this note were completed with a voice recognition program.  Efforts were made to edit the dictations but occasionally words are mis-transcribed.)    Mango Denney PA-C 12/31/22 8:28 PM    CHRIST Collins PA-C  12/31/22 2031

## 2023-01-01 NOTE — ED NOTES
Patient assisted to the restroom. Medication titrated per MAR. Patient resting in bed. Respirations easy and unlabored. No distress noted. Call light within reach.      Gabriella Calvo RN  12/31/22 2046

## 2023-01-01 NOTE — PROGRESS NOTES
Assessment and Plan:        Paroxysmal afib; has had it several yr. Amio stopped apparently due to headaches:  consult EP. Pt seems interested in ablation; will boost beta blocker  Dm- restart home meds        CC:  palpitations  HPI:  pt with hx paroxysmal afib, pacer, presented with afib; converted with cardizem. Mildly sxatic.  ROS (12 point review of systems completed. Pertinent positives noted. Otherwise ROS is negative) :   PMH:  Per HPI  SHX:  . Nonsmoker.   Rare etoh  FHX: cad  Allergies: See above    Medications:     dextrose      sodium chloride        insulin lispro  0-8 Units SubCUTAneous TID WC    insulin lispro  0-4 Units SubCUTAneous Nightly    [START ON 1/2/2023] rivaroxaban  20 mg Oral Daily with breakfast    alogliptin  12.5 mg Oral Daily    metFORMIN  1,000 mg Oral BID WC    gabapentin  600 mg Oral BID    gabapentin  1,200 mg Oral QHS    metoprolol succinate  50 mg Oral BID    sodium chloride flush  5-40 mL IntraVENous 2 times per day    gabapentin  600 mg Oral BID    pantoprazole  40 mg Oral QAM AC    levothyroxine  50 mcg Oral Daily       Vital Signs:   BP (!) 153/73   Pulse 89   Temp 97.4 °F (36.3 °C) (Oral)   Resp 18   Ht 5' 4\" (1.626 m)   Wt 136 lb (61.7 kg)   SpO2 99%   BMI 23.34 kg/m²      Intake/Output Summary (Last 24 hours) at 1/1/2023 1120  Last data filed at 1/1/2023 1111  Gross per 24 hour   Intake 480.6 ml   Output --   Net 480.6 ml        Physical Examination: General appearance - alert, well appearing, and in no distress  Mental status - alert, oriented to person, place, and time  Neck - supple, no significant adenopathy, no JVD, or carotid bruits  Chest - clear to auscultation, no wheezes, rales or rhonchi, symmetric air entry  Heart - normal rate, regular rhythm, normal S1, S2, no murmurs, rubs, clicks or gallops  Abdomen - soft, nontender, nondistended, no masses or organomegaly  Neurological - alert, oriented, normal speech, no focal findings or movement disorder noted  Musculoskeletal - no joint tenderness, deformity or swelling  Extremities - peripheral pulses normal, no pedal edema, no clubbing or cyanosis  Skin - normal coloration and turgor, no rashes, no suspicious skin lesions noted    Data: (All radiographs, tracings, PFTs, and imaging are personally viewed and interpreted unless otherwise noted).    Reviewed ekg, labs      Electronically signed by Joy Stack MD on 1/1/2023 at 11:20 AM

## 2023-01-01 NOTE — H&P
History & Physical       Patient: Brett Prather  YOB: 1950    MRN: [id number]     Acct: [de-identified]    PCP: No primary care provider on file. Date of Admission: 12/31/2022    Date of Service: Patient seen / examined on 01/01/23 and admitted to Inpatient with expected LOS greater than two midnights due to medical therapy. ASSESSMENT / PLAN:  Atrial Fibrillation w/ RVR  ECG reviewed. Started on Cardizem bolus + drip with conversion to NSR shortly after. Cardizem discontinued. Metoprolol resumed. BP stable. [id number] score of 4. Xarelto resumed. Maintain telemetry. Patient would like [facility] Cardiology referral at discharge. Primary HTN  Metoprolol resumed with hold parameters    Hypothyroidism  TSH 2.400. Synthroid resumed    Type 2 DM  . Patient has been out of Janumet for the past few days. HgbA1c (pending). Janumet held. Medium dose corrective algorithm. Hypoglycemic protocol. POC glucose. Neuropathy  Gabapentin resumed    GERD  PPI resumed    BRAXTON  Not on CPAP      Chief Complaint:  Palpitations    History of Present Illness:  67 y.o. female with an Hx of HTN, PAF, DM2, hypothyroidism, neuropathy, GERD and BRAXTON who presented to [address] [place] [address] for evaluation of palpitations, shortness of breath and chest tightness. Symptoms started last night. Progressively worsening. This prompted patient to come to the ED for evaluation. Patient has a history of atrial fibrillation. She is not happy with her current cardiologist and would like to see cardiology at [facility]. Denies chest pain, lightheadedness, dizziness, leg swelling, syncope or LOC.       Past Medical History:    Past Medical History:   Diagnosis Date    Anxiety     Arthritis     Atrial fibrillation (HCC)     Fibromyalgia     GERD (gastroesophageal reflux disease)     Hyperlipidemia     Hypertension     Hypothyroidism     Neuropathy     arms and legs    Osteoporosis     Type 2 diabetes mellitus without complication [facility])      Past Surgical History:    Past Surgical History:   Procedure Laterality Date    APPENDECTOMY      BLADDER SURGERY      nerve block placed per pt x 3    BLADDER SUSPENSION      CARPAL TUNNEL RELEASE Right     CHOLECYSTECTOMY      COLONOSCOPY  2010, 2013    CYST REMOVAL      HYSTERECTOMY (CERVIX STATUS UNKNOWN)      PACEMAKER INSERTION  09/2020    Dr Maury Durán ENDOSCOPY  [id number]    UPPER GASTROINTESTINAL ENDOSCOPY        Medications Prior to Admission:   No current facility-administered medications on file prior to encounter. Current Outpatient Medications on File Prior to Encounter   Medication Sig Dispense Refill    clonazePAM (KLONOPIN) 1 MG tablet Take 1 tablet by mouth 3 times daily as needed for Anxiety for up to 90 days. (Patient taking differently: Take 1 mg by mouth 3 times daily as needed for Anxiety. One in AM two at night.) 270 tablet 0    lansoprazole (PREVACID) 30 MG delayed release capsule Take 1 capsule by mouth daily 90 capsule 1    rivaroxaban (XARELTO) 20 MG TABS tablet Take 1 tablet by mouth daily (with breakfast) 30 tablet 5    levothyroxine (SYNTHROID) 50 MCG tablet Take 1 tablet by mouth Daily 90 tablet 0    gabapentin (NEURONTIN) 600 MG tablet Take 600 mg by mouth 2 times daily. Supposed to be 600mg QID. Patient takes 0.5 tablet BID      JANUMET  MG per tablet Take 1 tablet by mouth 2 times daily (with meals) 60 tablet 0    metoprolol succinate (TOPROL XL) 50 MG extended release tablet Take 1 tablet by mouth daily 90 tablet 1    amiodarone (CORDARONE) 200 MG tablet Take 1 tablet by mouth daily (Patient not taking: Reported on 12/31/2022) 90 tablet 1    [DISCONTINUED] JANUMET  MG per tablet Take 1 tablet by mouth 2 times daily (with meals) 60 tablet 0    gabapentin (NEURONTIN) 600 MG tablet Take 1 tablet by mouth 4 times daily for 152 days.  One tablet in AM, one tablet at lunch time, 2 tablets at bedtime 360 tablet 1     Allergies:   Adhesive tape, Codeine, and Sulfa antibiotics    Social History:   Social History     Socioeconomic History    Marital status:      Spouse name: Not on file    Number of children: Not on file    Years of education: Not on file    Highest education level: Not on file   Occupational History    Not on file   Tobacco Use    Smoking status: Former     Packs/day: 0.25     Years: 4.00     Pack years: 1.00     Types: Cigarettes     Quit date: 1989     Years since quittin.0    Smokeless tobacco: Never    Tobacco comments:     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   Vaping Use    Vaping Use: Never used   Substance and Sexual Activity    Alcohol use: Yes     Comment: very rarely    Drug use: No    Sexual activity: Not on file   Other Topics Concern    Not on file   Social History Narrative    Not on file     Social Determinants of Health     Financial Resource Strain: Not on file   Food Insecurity: Not on file   Transportation Needs: Not on file   Physical Activity: Not on file   Stress: Not on file   Social Connections: Not on file   Intimate Partner Violence: Not on file   Housing Stability: Not on file     Family History:    Family History   Problem Relation Age of Onset    Arthritis Mother     Miscarriages / Stillbirths Mother     Kavita Allan Mother 80    Hearing Loss Father     Heart Disease Father     High Blood Pressure Father     Cancer Sister 67        throat cancer    Stomach Cancer Sister     Other Brother         throat cancer    Diabetes Paternal Aunt     Other Brother         throat cancer    Other Brother         throat cancer    Uterine Cancer Sister      REVIEW OF SYSTEMS:  A 14-point ROS was obtained and negative, with the exception of pertinent positives as listed below:    Positive for palpitations, shortness of breath and chest pressure.     PHYSICAL EXAM:  Vitals:    22 2200 22 2215 22 2230 22 2300   BP: 132/80 128/78 130/76 126/80   Pulse: 80 86 76 68   Resp:    20   Temp:    98 °F (36.7 °C)   TempSrc:    Oral   SpO2:    97%   Weight:       Height:         General appearance: Alert / ill-appearing. Cooperative. NAD. HEENT:  Normocephalic / atraumatic. PERRL. EOM intact. Conjunctivae appear normal.  Neck: Supple. No JVD. Respiratory: Normal respiratory effort on RA. CTAB. No wheezes / rales / rhonchi. Cardiovascular: RRR. Normal S1/S2. No murmurs / rubs / gallops. Abdomen: Soft / non-tender / non-distended. BS present. Musculoskeletal: No cyanosis or edema. Skin: Warm / dry. Normal turgor. Neurologic: A/O x 3. Speech normal. Answers questions appropriately. CN intact. No obvious focal neurologic deficits. Psychiatric: Thought content / judgment / insight appear appropriate. Capillary refill: Brisk bilaterally. Peripheral pulses: +2 bilaterally.     Labs:   Results for orders placed or performed during the hospital encounter of 12/31/22   COVID-19 & Influenza Combo    Specimen: Nasopharyngeal Swab   Result Value Ref Range    SARS-CoV-2 RNA, RT PCR NOT DETECTED NOT DETECTED    INFLUENZA A NOT DETECTED NOT DETECTED    INFLUENZA B NOT DETECTED NOT DETECTED   CBC with Auto Differential   Result Value Ref Range    WBC 15.7 (H) 4.8 - 10.8 thou/mm3    RBC 4.82 4.20 - 5.40 mill/mm3    Hemoglobin 13.5 12.0 - 16.0 gm/dl    Hematocrit 41.6 37.0 - 47.0 %    MCV 86.3 81.0 - 99.0 fL    MCH 28.0 26.0 - 33.0 pg    MCHC 32.5 32.2 - 35.5 gm/dl    RDW-CV 13.7 11.5 - 14.5 %    RDW-SD 43.3 35.0 - 45.0 fL    Platelets 119 332 - 906 thou/mm3    MPV 9.5 9.4 - 12.4 fL    Seg Neutrophils 68.2 %    Lymphocytes 24.7 %    Monocytes 5.9 %    Eosinophils 0.4 %    Basophils 0.2 %    Immature Granulocytes 0.6 %    Segs Absolute 10.7 (H) 1.8 - 7.7 thou/mm3    Lymphocytes Absolute 3.9 1.0 - 4.8 thou/mm3    Monocytes Absolute 0.9 0.4 - 1.3 thou/mm3    Eosinophils Absolute 0.1 0.0 - 0.4 thou/mm3    Basophils Absolute 0.0 0.0 - 0.1 thou/mm3    Immature Grans (Abs) 0.09 (H) 0.00 - 0.07 thou/mm3    nRBC 0 /100 wbc   Comprehensive Metabolic Panel   Result Value Ref Range    Glucose 513 (HH) 70 - 108 mg/dL    Creatinine 1.2 0.4 - 1.2 mg/dL    BUN 23 (H) 7 - 22 mg/dL    Sodium 135 135 - 145 meq/L    Potassium 4.4 3.5 - 5.2 meq/L    Chloride 99 98 - 111 meq/L    CO2 20 (L) 23 - 33 meq/L    Calcium 9.6 8.5 - 10.5 mg/dL    AST 13 5 - 40 U/L    Alkaline Phosphatase 59 38 - 126 U/L    Total Protein 7.4 6.1 - 8.0 g/dL    Albumin 4.0 3.5 - 5.1 g/dL    Total Bilirubin 0.4 0.3 - 1.2 mg/dL    ALT 11 11 - 66 U/L   Troponin   Result Value Ref Range    Troponin T < 0.010 ng/ml   Brain Natriuretic Peptide   Result Value Ref Range    Pro-.7 (H) 0.0 - 124.0 pg/mL   TSH with Reflex   Result Value Ref Range    TSH 2.400 0.400 - 4.200 uIU/mL   Magnesium   Result Value Ref Range    Magnesium 2.0 1.6 - 2.4 mg/dL   Anion Gap   Result Value Ref Range    Anion Gap 16.0 8.0 - 16.0 meq/L   Glomerular Filtration Rate, Estimated   Result Value Ref Range    Est, Glom Filt Rate 48 (A) >60 ml/min/1.73m2   Osmolality   Result Value Ref Range    Osmolality Calc 296.8 275.0 - 300.0 mOsmol/kg   POCT Glucose   Result Value Ref Range    POC Glucose 298 (H) 70 - 108 mg/dl   EKG Irregular Heart Beat   Result Value Ref Range    Ventricular Rate 169 BPM    QRS Duration 72 ms    Q-T Interval 272 ms    QTc Calculation (Bazett) 456 ms    R Axis 78 degrees    T Axis 88 degrees   EKG 12 Lead   Result Value Ref Range    Ventricular Rate 80 BPM    Atrial Rate 80 BPM    P-R Interval 204 ms    QRS Duration 64 ms    Q-T Interval 360 ms    QTc Calculation (Bazett) 415 ms    P Axis 44 degrees    R Axis 21 degrees    T Axis 50 degrees       Radiology:     CXR:   XR CHEST PORTABLE    Result Date: 12/31/2022  PROCEDURE: XR CHEST PORTABLE CLINICAL INFORMATION: chest pressure COMPARISON: No prior study. TECHNIQUE: A single mobile view of the chest was obtained. 1. Normal heart size. Permanent dual-chamber pacemaker.  Evidence for old, healed granulomatous disease. 2. No acute findings. No infiltrates or effusions are seen **This report has been created using voice recognition software. It may contain minor errors which are inherent in voice recognition technology. ** Final report electronically signed by Dr. Enrique Brock on 12/31/2022 7:35 PM    FEN/GI/DVT:  IVF: NS @ 100 mL/hr  Electrolytes: Monitor and replace per protocols  Diet: Diabetic  GI PPX: On PPI for GERD  DVT Prophylaxis: No prophylaxis/Already on anticoagulation: Xarelto    CODE STATUS:  Full    Thank you No primary care provider on file. for the opportunity to be involved in this patient's care.     Electronically signed by Betzaida Cabrera MD on 1/1/2023 at 3:45 AM

## 2023-01-01 NOTE — ED NOTES
Patient resting in bed. Respirations easy and unlabored. No distress noted. Call light within reach.        Clay Lion RN  12/31/22 [id number]

## 2023-01-01 NOTE — CONSULTS
[address] [facility]  Dept: [phone]  Cardiac Electrophysiology: Inpatient Consultation Note  Patient's demographics:  Date:   1/1/2023  Patient name:              Stephanie Renner  YOB: 1950  Sex:    female   MRN:   [id number]    Primary Care Physician:  No primary care provider on file. Consulting Physician:  Veda Reyes MD    Reason for Consultation:  Atrial fibrillation, evaluation for catheter ablation. Clinical Summary:  72/F was diagnosed to have atrial fibrillation in 2018 when she started to have recurrent episodes of palpitations associated with dizziness. She spontaneously converted to sinus rhythm. Evaluation at that time including cardiac stress test and echocardiogram were unremarkable. She was started on rivaroxaban for stroke prevention and metoprolol. She started having more frequent episodes, 3-4 times a week and was started on amiodarone which was discontinued in 2020 because of headaches and sinus bradycardia, requiring pacemaker implantation. Does not recall having been tried on any other antiarrhythmic therapy. Catheter ablation was recommended but she decided not to pursue. She follows Dr. Allen Dunham at [facility].  The patient was doing well yesterday and started having palpitations and shortness of breath. This lasted for several hours before she presented to the emergency room. She was started on intravenous Cardizem and spontaneously converted to sinus rhythm. Routine laboratory work-up including TSH within normal limits. Doing well at this time. Reports palpitations 2-3 times every 10 to 15 days lasting for hours, longest 8 hours. No syncope, orthopnea, lower extremity swelling.  Medcial Hx: PAF dx 2015 on metoprolol and Xeralto, intolerant to amiodarone (headache), SSS/DCPM (9/2020), HTN, HPL, untreated BRAXTON, DM2 with sensroy neuropathy, hypothyroidism, GERD, fibromyalgia and osteoarthritis. Review of systems:  Constitutional: Negative for chills and fever  HENT: Negative for congestion, sinus pressure, sneezing and sore throat. Eyes: Negative for pain, discharge, redness and itching. Respiratory: Negative for apnea, cough  Gastrointestinal: Negative for blood in stool, constipation, diarrhea   Endocrine: Negative for cold intolerance, heat intolerance, polydipsia. Genitourinary: Negative for dysuria, enuresis, flank pain and hematuria. Musculoskeletal: Negative for arthralgias, joint swelling and neck pain. Neurological: Negative for numbness and headaches. Psychiatric/Behavioral: Negative for agitation, confusion, decreased concentration and dysphoric mood.       Past Medical History[de-identified]  Past Medical History:   Diagnosis Date    Anxiety     Arthritis     Atrial fibrillation (HCC)     Fibromyalgia     GERD (gastroesophageal reflux disease)     Hyperlipidemia     Hypertension     Hypothyroidism     Neuropathy     arms and legs    Osteoporosis     Type 2 diabetes mellitus without complication ([facility] [address]        Past Surgical History:  Past Surgical History:   Procedure Laterality Date    APPENDECTOMY      BLADDER SURGERY      nerve block placed per pt x 3    BLADDER SUSPENSION      CARPAL TUNNEL RELEASE Right     CHOLECYSTECTOMY      COLONOSCOPY  2010, 2013    CYST REMOVAL      HYSTERECTOMY (CERVIX STATUS UNKNOWN)      PACEMAKER INSERTION  09/2020    Dr Ulysses Miller GASTROINTESTINAL ENDOSCOPY  [phone]    UPPER GASTROINTESTINAL ENDOSCOPY         Family History:  Family History   Problem Relation Age of Onset    Arthritis Mother     Miscarriages / [employer] Mother     Lung Cancer Mother 80    Hearing Loss Father     Heart Disease Father     High Blood Pressure Father     Cancer Sister 67        throat cancer    Stomach Cancer Sister     Other Brother         throat cancer    Diabetes Paternal Aunt     Other Brother         throat cancer Other Brother         throat cancer    Uterine Cancer Sister         Social History:  Social History     Socioeconomic History    Marital status:      Spouse name: None    Number of children: None    Years of education: None    Highest education level: None   Tobacco Use    Smoking status: Former     Packs/day: 0.25     Years: 4.00     Pack years: 1.00     Types: Cigarettes     Quit date: 1989     Years since quittin.0    Smokeless tobacco: Never    Tobacco comments:     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   Vaping Use    Vaping Use: Never used   Substance and Sexual Activity    Alcohol use: Yes     Comment: very rarely    Drug use: No        Allergies:   Allergies   Allergen Reactions    Adhesive Tape Other (See Comments)     redness    Codeine     Sulfa Antibiotics         Medications:  Current Facility-Administered Medications   Medication Dose Route Frequency Provider Last Rate Last Admin    insulin lispro (HUMALOG) injection vial 0-8 Units  0-8 Units SubCUTAneous TID LISA Fritz MD        insulin lispro (HUMALOG) injection vial 0-4 Units  0-4 Units SubCUTAneous Nightly Dary Fritz MD        glucose chewable tablet 16 g  4 tablet Oral PRN Dary Fritz MD        dextrose bolus 10% 125 mL  125 mL IntraVENous PRN Dary Fritz MD        Or    dextrose bolus 10% 250 mL  250 mL IntraVENous PRN Dary Fritz MD        glucagon (rDNA) injection 1 mg  1 mg SubCUTAneous PRN Dary Fritz MD        dextrose 10 % infusion   IntraVENous Continuous PRN MD Misha Springer ON 2023] rivaroxaban (XARELTO) tablet 20 mg  20 mg Oral Daily with breakfast Juarez Roblero MD        alogliptin (NESINA) tablet 12.5 mg  12.5 mg Oral Daily Juarez Roblero MD        metFORMIN (GLUCOPHAGE) tablet 1,000 mg  1,000 mg Oral BID  Juarez Roblero MD        gabapentin (NEURONTIN) capsule 600 mg  600 mg Oral BID Juarez Roblero MD        gabapentin (NEURONTIN) capsule 1,200 mg  1,200 mg Oral QHS Juarez Roblero MD metoprolol succinate (TOPROL XL) extended release tablet 50 mg  50 mg Oral BID Sahara Francois MD        sodium chloride flush 0.9 % injection 5-40 mL  5-40 mL IntraVENous 2 times per day Corwin Guillermo MD   10 mL at 01/01/23 0943    sodium chloride flush 0.9 % injection 5-40 mL  5-40 mL IntraVENous PRN Corwin Guillermo MD        0.9 % sodium chloride infusion   IntraVENous PRN Corwin Guillermo MD        ondansetron (ZOFRAN-ODT) disintegrating tablet 4 mg  4 mg Oral Q8H PRN Corwin Guillermo MD        Or    ondansetron [facility]) injection 4 mg  4 mg IntraVENous Q6H PRN Corwin Guillermo MD        polyethylene glycol (GLYCOLAX) packet 17 g  17 g Oral Daily PRN Corwin Guillermo MD        acetaminophen (TYLENOL) tablet 650 mg  650 mg Oral Q6H PRN Corwin Guillermo MD        Or    acetaminophen (TYLENOL) suppository 650 mg  650 mg Rectal Q6H PRN Corwin Guillermo MD        pantoprazole (PROTONIX) tablet 40 mg  40 mg Oral QAM AC Corwin Guillermo MD   40 mg at 01/01/23 0547    levothyroxine (SYNTHROID) tablet 50 mcg  50 mcg Oral Daily Corwin Guillermo MD   50 mcg at 01/01/23 0547       Physical Examination:  BP (!) 153/73   Pulse 89   Temp 97.4 °F (36.3 °C) (Oral)   Resp 18   Ht 5' 4\" (1.626 m)   Wt 136 lb (61.7 kg)   SpO2 99%   BMI 23.34 kg/m²     Intake/Output Summary (Last 24 hours) at 1/1/2023 1205  Last data filed at 1/1/2023 1111  Gross per 24 hour   Intake 480.6 ml   Output --   Net 480.6 ml     Patient Vitals for the past 96 hrs (Last 3 readings):   Weight   12/31/22 2145 136 lb (61.7 kg)     GENERAL: Alert and oriented. No distress. EYES: No pallor or icterus. ENT: No cyanosis. No thyromegaly or cervical LAP. VESSELS: No jugular venous distension or carotid bruits. HEART: Normal S1/S2. No murmur, rub or gallop. LUNGS: Clear to auscultation. ABDOMEN: Soft and non-tender. EXTREMITIES: No lower extremity edema. Feet are warm.    NEUROLOGICAL: Grossly normal.     Laboratory And Diagnostic Data  I have personally reviewed and interpreted the results of the following diagnostic testing    Lab Results   Component Value Date    WBC 11.9 (H) 01/01/2023    HGB 11.0 (L) 01/01/2023    HCT 33.3 (L) 01/01/2023     01/01/2023    CHOL 246 (H) 12/31/2020    TRIG 415 (H) 12/31/2020    HDL 48 12/31/2020    LDLDIRECT 159 (H) 12/31/2020    ALT 11 12/31/2022    AST 13 12/31/2022     01/01/2023    K 4.5 01/01/2023     01/01/2023    CREATININE 0.7 01/01/2023    BUN 19 01/01/2023    CO2 26 01/01/2023    TSH 2.400 12/31/2022    INR 2.73 (H) 09/07/2020    LABA1C 8.7 (H) 01/01/2023    LABMICR 19.1 11/07/2019       Echocardiogram Pending  ECG AF with RVR (169 BPM) and normal QTc and QRS. Stress test NA    Impression:  Paroxysmal symptomatic atrial fibrillation. [id number] score 4 age, sex, HTN and DM). HTN, controlled. DM, on combination therapy. BRAXTON not on CPAP (intolerance). Assessment And Recommendations: The patient has symptomatic atrial fibrillation which is affecting the quality of her life as well. Unfortunately, she has been intolerant to amiodarone. Discussed the importance of lifestyle modification including treatment of sleep apnea with the patient. Options for rhythm control discussed including reinitiation of antiarrhythmic therapy versus  catheter ablation. The risks and benefits of both approaches discussed in details. The patient wants to discuss with her children before making a decision. We will request the medical records from [facility] especially echocardiogram and stress test.  Continue amiodarone and metoprolol in the meantime. Questions answered. Thank you for allowing me to participate in the care of your patient. Please call me if you have any questions. **This report has been created using voice recognition software. It may contain minor errors which are inherent in voice recognition technology. **       Electronically signed by Benita Gonzalez MD, MRCPEarlene Riding on 1/1/2023 at 12:05 PM

## 2023-01-01 NOTE — PLAN OF CARE
Problem: Discharge Planning  Goal: Discharge to home or other facility with appropriate resources  Outcome: Progressing  Flowsheets  Taken 12/31/2022 2218 by Casimiro Cabrera RN  Discharge to home or other facility with appropriate resources:   Identify barriers to discharge with patient and caregiver   Identify discharge learning needs (meds, wound care, etc)   Refer to discharge planning if patient needs post-hospital services based on physician order or complex needs related to functional status, cognitive ability or social support system   Arrange for needed discharge resources and transportation as appropriate   Arrange for interpreters to assist at discharge as needed  Taken 12/31/2022 2145 by Casimiro Cabrera RN  Discharge to home or other facility with appropriate resources:   Identify barriers to discharge with patient and caregiver   Arrange for needed discharge resources and transportation as appropriate   Identify discharge learning needs (meds, wound care, etc)     Problem: Safety - Adult  Goal: Free from fall injury  Outcome: Progressing  Flowsheets (Taken 1/1/2023 1120)  Free From Fall Injury:   Instruct family/caregiver on patient safety   Based on caregiver fall risk screen, instruct family/caregiver to ask for assistance with transferring infant if caregiver noted to have fall risk factors     Problem: ABCDS Injury Assessment  Goal: Absence of physical injury  Outcome: Progressing  Flowsheets (Taken 1/1/2023 1120)  Absence of Physical Injury: Implement safety measures based on patient assessment     Problem: Cardiovascular - Adult  Goal: Maintains optimal cardiac output and hemodynamic stability  Outcome: Progressing  Flowsheets (Taken 1/1/2023 1120)  Maintains optimal cardiac output and hemodynamic stability:   Monitor blood pressure and heart rate   Monitor urine output and notify Licensed Independent Practitioner for values outside of normal range   Assess for signs of decreased cardiac output   Administer fluid and/or volume expanders as ordered   Administer vasoactive medications as ordered  Goal: Absence of cardiac dysrhythmias or at baseline  Outcome: Progressing  Flowsheets (Taken 1/1/2023 1120)  Absence of cardiac dysrhythmias or at baseline:   Monitor cardiac rate and rhythm   Assess for signs of decreased cardiac output   Administer antiarrhythmia medication and electrolyte replacement as ordered  Goal: Maintain normal heart rhythm  Outcome: Progressing  Goal: Cardiovascular alteration will improve  Description: Cardiovascular alteration will improve  Outcome: Progressing   Care plan reviewed with patient. Patient verbalizes understanding of the care plan and contributed to goal setting.

## 2023-01-02 VITALS
WEIGHT: 142.86 LBS | OXYGEN SATURATION: 97 % | HEIGHT: 64 IN | SYSTOLIC BLOOD PRESSURE: 133 MMHG | RESPIRATION RATE: 16 BRPM | TEMPERATURE: 97.7 F | BODY MASS INDEX: 24.39 KG/M2 | HEART RATE: 73 BPM | DIASTOLIC BLOOD PRESSURE: 65 MMHG

## 2023-01-02 LAB
GLUCOSE BLD-MCNC: 187 MG/DL (ref 70–108)
GLUCOSE BLD-MCNC: 229 MG/DL (ref 70–108)

## 2023-01-02 PROCEDURE — 6370000000 HC RX 637 (ALT 250 FOR IP): Performed by: STUDENT IN AN ORGANIZED HEALTH CARE EDUCATION/TRAINING PROGRAM

## 2023-01-02 PROCEDURE — 2580000003 HC RX 258: Performed by: STUDENT IN AN ORGANIZED HEALTH CARE EDUCATION/TRAINING PROGRAM

## 2023-01-02 PROCEDURE — 6370000000 HC RX 637 (ALT 250 FOR IP): Performed by: INTERNAL MEDICINE

## 2023-01-02 PROCEDURE — 82948 REAGENT STRIP/BLOOD GLUCOSE: CPT

## 2023-01-02 RX ORDER — ALBUTEROL SULFATE 90 UG/1
2 AEROSOL, METERED RESPIRATORY (INHALATION) PRN
OUTPATIENT
Start: 2023-01-02 | End: 2023-01-02

## 2023-01-02 RX ORDER — METOPROLOL TARTRATE 5 MG/5ML
5 INJECTION INTRAVENOUS EVERY 5 MIN PRN
OUTPATIENT
Start: 2023-01-02 | End: 2023-01-02

## 2023-01-02 RX ORDER — NITROGLYCERIN 0.4 MG/1
0.4 TABLET SUBLINGUAL EVERY 5 MIN PRN
OUTPATIENT
Start: 2023-01-02 | End: 2023-01-02

## 2023-01-02 RX ORDER — SODIUM CHLORIDE 9 MG/ML
500 INJECTION, SOLUTION INTRAVENOUS CONTINUOUS PRN
OUTPATIENT
Start: 2023-01-02 | End: 2023-01-02

## 2023-01-02 RX ORDER — ATROPINE SULFATE 0.1 MG/ML
0.5 INJECTION INTRAVENOUS EVERY 5 MIN PRN
OUTPATIENT
Start: 2023-01-02 | End: 2023-01-02

## 2023-01-02 RX ORDER — METOPROLOL SUCCINATE 50 MG/1
50 TABLET, EXTENDED RELEASE ORAL 2 TIMES DAILY
Qty: 30 TABLET | Refills: 3 | Status: SHIPPED | OUTPATIENT
Start: 2023-01-02

## 2023-01-02 RX ORDER — SODIUM CHLORIDE 0.9 % (FLUSH) 0.9 %
5-40 SYRINGE (ML) INJECTION PRN
OUTPATIENT
Start: 2023-01-02 | End: 2023-01-02

## 2023-01-02 RX ORDER — PANTOPRAZOLE SODIUM 40 MG/1
40 TABLET, DELAYED RELEASE ORAL
Qty: 30 TABLET | Refills: 3 | Status: SHIPPED | OUTPATIENT
Start: 2023-01-03

## 2023-01-02 RX ORDER — AMINOPHYLLINE DIHYDRATE 25 MG/ML
50 INJECTION, SOLUTION INTRAVENOUS PRN
OUTPATIENT
Start: 2023-01-02 | End: 2023-01-02

## 2023-01-02 RX ADMIN — ALOGLIPTIN 12.5 MG: 12.5 TABLET, FILM COATED ORAL at 09:04

## 2023-01-02 RX ADMIN — GABAPENTIN 600 MG: 300 CAPSULE ORAL at 09:03

## 2023-01-02 RX ADMIN — METFORMIN HYDROCHLORIDE 1000 MG: 500 TABLET ORAL at 09:03

## 2023-01-02 RX ADMIN — METOPROLOL SUCCINATE 50 MG: 50 TABLET, EXTENDED RELEASE ORAL at 09:03

## 2023-01-02 RX ADMIN — RIVAROXABAN 20 MG: 20 TABLET, FILM COATED ORAL at 09:03

## 2023-01-02 RX ADMIN — PANTOPRAZOLE SODIUM 40 MG: 40 TABLET, DELAYED RELEASE ORAL at 06:21

## 2023-01-02 RX ADMIN — INSULIN LISPRO 2 UNITS: 100 INJECTION, SOLUTION INTRAVENOUS; SUBCUTANEOUS at 12:41

## 2023-01-02 RX ADMIN — SODIUM CHLORIDE, PRESERVATIVE FREE 10 ML: 5 INJECTION INTRAVENOUS at 09:05

## 2023-01-02 RX ADMIN — LEVOTHYROXINE SODIUM 50 MCG: 0.05 TABLET ORAL at 06:21

## 2023-01-02 NOTE — PROGRESS NOTES
Cardiology Progress Note      Patient:  Radha Vargas  YOB: 1950  MRN: [id number]   Acct: [de-identified]  Admit Date:  12/31/2022  Primary Cardiologist:  Dr. Raghavendra Collier Cardiologist:  Brittani Bill MD    Rationale for Cardiology consult: Atrial fibrillation, evaluation for catheter ablation    HPI/PMH: Per Dr. Ewa Zimmerman note of 1/1/23;  72/F was diagnosed to have atrial fibrillation in 2018 when she started to have recurrent episodes of palpitations associated with dizziness. She spontaneously converted to sinus rhythm. Evaluation at that time including cardiac stress test and echocardiogram were unremarkable. She was started on rivaroxaban for stroke prevention and metoprolol. She started having more frequent episodes, 3-4 times a week and was started on amiodarone which was discontinued in 2020 because of headaches and sinus bradycardia, requiring pacemaker implantation. Does not recall having been tried on any other antiarrhythmic therapy. Catheter ablation was recommended but she decided not to pursue. She follows Dr. Xenia Guerrero at [facility].  The patient was doing well yesterday and started having palpitations and shortness of breath. This lasted for several hours before she presented to the emergency room. She was started on intravenous Cardizem and spontaneously converted to sinus rhythm. Routine laboratory work-up including TSH within normal limits. Doing well at this time. Reports palpitations 2-3 times every 10 to 15 days lasting for hours, longest 8 hours. No syncope, orthopnea, lower extremity swelling. Medcial Hx: PAF dx 2015 on metoprolol and Xeralto, intolerant to amiodarone (headache), SSS/DCPM (9/2020), HTN, HPL, untreated BRAXTON, DM2 with sensroy neuropathy, hypothyroidism, GERD, fibromyalgia and osteoarthritis. Chief Complaint: \"Feel fine - want to go home\".      Subjective (Events in last 24 hours):   Now in SR 70 - 80's  VSS  No chest discomfort or dyspnea  No lightheadedness or dizziness; no syncope or near syncope  Was having intermittent balance issues at home when in afib with rapid HR; none now  Olivia Hoang deal of stress since lost spouse suddenly to massive MI in 4/2022  Willing to have OP echo and lexiscan stress (last done at [facility] prior to PPM placement); unable to walk uphill on treadmill - has had mobility issues for some time - would not tolerate treadmill test  States wants to pursue ablation  Currently tolerating meds      Objective:   BP (!) 103/54   Pulse 75   Temp 98.5 °F (36.9 °C) (Oral)   Resp 16   Ht 5' 4\" (1.626 m)   Wt 142 lb 13.7 oz (64.8 kg)   SpO2 98%   BMI 24.52 kg/m²        TELEMETRY: SR 70 - 80's, isolated PACs    Physical Exam:  General Appearance: alert and oriented to person, place and time, in no acute distress, resting in bed with son at bedside  Cardiovascular: normal rate, regular rhythm, normal S1 and S2, (+) murmurs, rubs, clicks, or gallops, distal pulses intact, no JVD  Pulmonary/Chest: clear to auscultation bilaterally- no wheezes, rales or rhonchi, normal air movement, no respiratory distress, RA  Abdomen: soft, non-tender, non-distended, normal bowel sounds  Extremities: no cyanosis, clubbing or edema, pulses 2+  Skin: warm and dry, no rash or erythema  Head: normocephalic and atraumatic  Eyes: pupils equal, round, and reactive to light  Neck: supple and non-tender without mass, no thyromegaly   Musculoskeletal: normal range of motion, no joint swelling, deformity or tenderness  Neurological: alert, oriented, normal speech, no focal findings or movement disorder noted    Medications:    insulin lispro  0-8 Units SubCUTAneous TID WC    insulin lispro  0-4 Units SubCUTAneous Nightly    rivaroxaban  20 mg Oral Daily with breakfast    alogliptin  12.5 mg Oral Daily    metFORMIN  1,000 mg Oral BID WC    gabapentin  600 mg Oral BID    metoprolol succinate  50 mg Oral BID    gabapentin  400 mg Oral QHS    sodium chloride flush  5-40 mL IntraVENous 2 times per day    pantoprazole  40 mg Oral QAM AC    levothyroxine  50 mcg Oral Daily      dextrose      sodium chloride       glucose, 4 tablet, PRN  dextrose bolus, 125 mL, PRN   Or  dextrose bolus, 250 mL, PRN  glucagon (rDNA), 1 mg, PRN  dextrose, , Continuous PRN  sodium chloride flush, 5-40 mL, PRN  sodium chloride, , PRN  ondansetron, 4 mg, Q8H PRN   Or  ondansetron, 4 mg, Q6H PRN  polyethylene glycol, 17 g, Daily PRN  acetaminophen, 650 mg, Q6H PRN   Or  acetaminophen, 650 mg, Q6H PRN        Diagnostics:  EK22  EKG 12 Lead  Order: [id number]  Status: Final result    Visible to patient: Yes (not seen)    Next appt: None    0 Result Notes  Component Ref Range & Units 22 2247 22 1859 3/2/21 0930 12 1402   Ventricular Rate BPM 80  169  67  104    Atrial Rate BPM 80   67  104    P-R Interval ms 204   234  164    QRS Duration ms 64  72  78  74    Q-T Interval ms 360  272  454  366    QTc Calculation (Bazett) ms 415  456  479  481    P Axis degrees 44   28  49    R Axis degrees 21  78  39  44    T Axis degrees 50  88  63  60    Resulting Agency  [address] [facility] STR MUSE [facility] STR MUSE [facility] STR MUSE [address] [facility] MUSE           Narrative & Impression    Normal sinus rhythm  Normal ECG  When compared with ECG of 31-DEC-2022 18:59,  Sinus rhythm has replaced Atrial fibrillation  Ventricular rate has decreased BY  89 BPM  Nonspecific T wave abnormality no longer evident in Lateral leads  Confirmed by Helen Briceno ([id number]) on 2023 1:35:00 PM           Echo: LM - records pending      Stress: LM - records pending    Left Heart Cath: none    Lab Data:    Cardiac Enzymes:  No results for input(s): CKTOTAL, CKMB, CKMBINDEX, TROPONINI in the last 72 hours.     CBC:   Lab Results   Component Value Date/Time    WBC 11.9 2023 03:38 AM    RBC 3.93 2023 03:38 AM    HGB 11.0 2023 03:38 AM    HCT 33.3 2023 03:38 AM     2023 03:38 AM       CMP:    Lab Results   Component Value Date/Time     01/01/2023 03:38 AM    K 4.5 01/01/2023 03:38 AM     01/01/2023 03:38 AM    CO2 26 01/01/2023 03:38 AM    BUN 19 01/01/2023 03:38 AM    CREATININE 0.7 01/01/2023 03:38 AM    AGRATIO 1.4 12/31/2020 10:11 AM    LABGLOM >60 01/01/2023 03:38 AM    GLUCOSE 193 01/01/2023 03:38 AM    GLUCOSE 89 12/31/2020 10:11 AM    CALCIUM 9.2 01/01/2023 03:38 AM       Hepatic Function Panel:    Lab Results   Component Value Date/Time    ALKPHOS 59 12/31/2022 07:10 PM    ALT 11 12/31/2022 07:10 PM    AST 13 12/31/2022 07:10 PM    PROT 7.4 12/31/2022 07:10 PM    BILITOT 0.4 12/31/2022 07:10 PM    LABALBU 4.0 12/31/2022 07:10 PM       Magnesium:    Lab Results   Component Value Date/Time    MG 2.0 12/31/2022 07:10 PM       PT/INR:    Lab Results   Component Value Date/Time    PROTIME 31.1 09/07/2020 12:50 PM    INR 2.73 09/07/2020 12:50 PM       HgBA1c:    Lab Results   Component Value Date/Time    LABA1C 8.7 01/01/2023 03:38 AM       FLP:    Lab Results   Component Value Date/Time    TRIG 415 12/31/2020 10:11 AM    HDL 48 12/31/2020 10:11 AM    LDLCALC 106 08/16/2016 10:29 AM    LDLDIRECT 159 12/31/2020 10:11 AM       TSH:    Lab Results   Component Value Date/Time    TSH 2.400 12/31/2022 07:10 PM         Assessment/Plan:  Paroxysmal atrial fibrillation (known since 2015)  Presented with palpitations and SOB  IV cardizem ED - converted to [address] [place] [address] in SR; VSS  Xarelto and metoprolol  (Toprol XL QD PTA - now BID - continue)  Intolerance to Amiodarone 2020 (headaches)  Ablation previously offerred - decided against  TSH normal  SSS/DCPM 9/2020  HTN  HLD  Untreated BRAXTON  Treatment recommended  DM II  Hypothyroidism  GERD  Fibromyalgia  OA    Patient wants to pursue ablation. Follow-up in office with Dr. Giles Billy in 2 weeks  Echo and stress recommended per Dr. Giles Billy. No anginal sx or evidence of decompensated HF. Will schedule as OP to be performed to have information prior to ablation. Patient wishes to follow with . [facility]. Obtain all records from [facility] cardiology - echo, stress, PPM insertion, event monitor records please   Discussed sx precipitating seeking re-evaluation in ED with patient and son - verbalizes understanding. Ambulate in espinosa - if remains clinically stable and in SR then may discharge to home from cardiology standpoint.        Electronically signed by GUICHO Graham CNP on 1/2/2023 at 6:43 AM

## 2023-01-02 NOTE — PLAN OF CARE
Problem: Discharge Planning  Goal: Discharge to home or other facility with appropriate resources  1/2/2023 0032 by Walter Gann RN  Outcome: Progressing  Flowsheets (Taken 1/2/2023 0032)  Discharge to home or other facility with appropriate resources:   Identify barriers to discharge with patient and caregiver   Identify discharge learning needs (meds, wound care, etc)  1/1/2023 1120 by Shonna Chappell RN  Outcome: Progressing  Flowsheets  Taken 12/31/2022 2218 by Shyla Garza RN  Discharge to home or other facility with appropriate resources:   Identify barriers to discharge with patient and caregiver   Identify discharge learning needs (meds, wound care, etc)   Refer to discharge planning if patient needs post-hospital services based on physician order or complex needs related to functional status, cognitive ability or social support system   Arrange for needed discharge resources and transportation as appropriate   Arrange for interpreters to assist at discharge as needed  Taken 12/31/2022 2145 by Shyla Garza RN  Discharge to home or other facility with appropriate resources:   Identify barriers to discharge with patient and caregiver   Arrange for needed discharge resources and transportation as appropriate   Identify discharge learning needs (meds, wound care, etc)     Problem: Safety - Adult  Goal: Free from fall injury  1/2/2023 0032 by Walter Gann RN  Outcome: Progressing  1/1/2023 1120 by Shonna Chappell RN  Outcome: Progressing  Flowsheets (Taken 1/1/2023 1120)  Free From Fall Injury:   Instruct family/caregiver on patient safety   Based on caregiver fall risk screen, instruct family/caregiver to ask for assistance with transferring infant if caregiver noted to have fall risk factors     Problem: ABCDS Injury Assessment  Goal: Absence of physical injury  1/2/2023 0032 by Walter Gann RN  Outcome: Progressing  1/1/2023 1120 by Shonna Chappell RN  Outcome: Progressing  Flowsheets (Taken 1/1/2023 1120)  Absence of Physical Injury: Implement safety measures based on patient assessment     Problem: Cardiovascular - Adult  Goal: Maintains optimal cardiac output and hemodynamic stability  1/2/2023 0032 by Cm Currie RN  Outcome: Progressing  Flowsheets (Taken 1/2/2023 0032)  Maintains optimal cardiac output and hemodynamic stability:   Monitor blood pressure and heart rate   Monitor urine output and notify Licensed Independent Practitioner for values outside of normal range   Assess for signs of decreased cardiac output  1/1/2023 1120 by Sarah Palacio RN  Outcome: Progressing  Flowsheets (Taken 1/1/2023 1120)  Maintains optimal cardiac output and hemodynamic stability:   Monitor blood pressure and heart rate   Monitor urine output and notify Licensed Independent Practitioner for values outside of normal range   Assess for signs of decreased cardiac output   Administer fluid and/or volume expanders as ordered   Administer vasoactive medications as ordered  Goal: Absence of cardiac dysrhythmias or at baseline  1/2/2023 0032 by Cm Currie RN  Outcome: Progressing  Flowsheets (Taken 1/2/2023 0032)  Absence of cardiac dysrhythmias or at baseline:   Monitor cardiac rate and rhythm   Assess for signs of decreased cardiac output  1/1/2023 1120 by Sarah Palacio RN  Outcome: Progressing  Flowsheets (Taken 1/1/2023 1120)  Absence of cardiac dysrhythmias or at baseline:   Monitor cardiac rate and rhythm   Assess for signs of decreased cardiac output   Administer antiarrhythmia medication and electrolyte replacement as ordered  Goal: Maintain normal heart rhythm  1/2/2023 0032 by Cm Currie RN  Outcome: Progressing  1/1/2023 1120 by Sarah Palacio RN  Outcome: Progressing  Goal: Cardiovascular alteration will improve  Description: Cardiovascular alteration will improve  1/2/2023 0032 by Cm Currie RN  Outcome: Progressing  1/1/2023 1120 by Sarah Palacio RN  Outcome: Progressing     Care plan reviewed with patient. Patient verbalizes understanding of the care plan and contributed to goal setting.

## 2023-01-02 NOTE — PROGRESS NOTES
Discussed discharge summary with patient. Instructed patient about medications & follow up appointments. Instructed patient to call tomorrow to make follow up appointments as offices are closed today. Instructed patient to  medications outpatient as [address] [place] [address] is closed today. Patient denies any additional questions. Patient was discharged with all belongings. No distress noted. Patient discharged to home. Taken down to the vehicle by RN per wheelchair.

## 2023-01-02 NOTE — DISCHARGE INSTRUCTIONS
Follow-up with Dr. Valentin Mace in office in 2 weeks - patient wants to pursue afib ablation. Schedule for OP echo and lexiscan stress test in next 1 - 2 weeks as ordered.

## 2023-01-03 ENCOUNTER — TELEPHONE (OUTPATIENT)
Dept: CARDIOLOGY CLINIC | Age: 73
End: 2023-01-03

## 2023-01-03 NOTE — TELEPHONE ENCOUNTER
Dr Valentin Mace consulted while inpatient. Saw 1/1/23. Poss afib ablation. Need to call patient in a week or so to see if she has made a decision    Dr Valentin Mace, hospital records from [facility] below.

## 2023-01-05 ENCOUNTER — HOSPITAL ENCOUNTER (OUTPATIENT)
Dept: NON INVASIVE DIAGNOSTICS | Age: 73
Discharge: HOME | End: 2023-01-05
Payer: MEDICARE

## 2023-01-05 DIAGNOSIS — I48.91 ATRIAL FIBRILLATION WITH RVR (HCC): ICD-10-CM

## 2023-01-05 DIAGNOSIS — R06.02 SHORTNESS OF BREATH: ICD-10-CM

## 2023-01-05 PROCEDURE — 93017 CV STRESS TEST TRACING ONLY: CPT | Performed by: INTERNAL MEDICINE

## 2023-01-05 PROCEDURE — 78452 HT MUSCLE IMAGE SPECT MULT: CPT | Performed by: INTERNAL MEDICINE

## 2023-01-05 PROCEDURE — A9500 TC99M SESTAMIBI: HCPCS | Performed by: INTERNAL MEDICINE

## 2023-01-05 PROCEDURE — 3430000000 HC RX DIAGNOSTIC RADIOPHARMACEUTICAL: Performed by: INTERNAL MEDICINE

## 2023-01-05 PROCEDURE — 6360000002 HC RX W HCPCS

## 2023-01-05 PROCEDURE — 93306 TTE W/DOPPLER COMPLETE: CPT

## 2023-01-05 RX ORDER — TECHNETIUM TC-99M SESTAMIBI 1 MG/10ML
10 INJECTION INTRAVENOUS
Status: COMPLETED | OUTPATIENT
Start: 2023-01-05 | End: 2023-01-05

## 2023-01-05 RX ORDER — TECHNETIUM TC-99M SESTAMIBI 1 MG/10ML
31 INJECTION INTRAVENOUS
Status: COMPLETED | OUTPATIENT
Start: 2023-01-05 | End: 2023-01-05

## 2023-01-05 RX ADMIN — Medication 10 MILLICURIE: at 09:10

## 2023-01-05 RX ADMIN — Medication 31 MILLICURIE: at 10:10

## 2023-01-06 NOTE — DISCHARGE SUMMARY
Hospital Medicine Discharge Summary      Patient Identification:   Mere Reynoso   : 1950  MRN: [id number]   Account: [de-identified]   Patient's PCP: Sabrina Gutierrez    Admit Date: 2022   Discharge Date: 2023      Admitting Physician: Malu Major MD  Discharge Physician: Matt Garner DO       Discharge Diagnoses:  Paroxysmal AF with RVR: converted in ED after Cardizem bolus. Increased dose of Toprol. Follow-up with Dr. Valentin Mace in 2 weeks for ablation consideration. Avoid amio due to intolerance  SSS s/p PPM  HTN  HLD  Hypothyroidism: normal tsh  BRAXTON untreated: Reports rash with mask use, advised to follow-up with pulmonary for alternative options. NIDDM2  GERD  Former tobacco    Hospital Course: Mere Reynoso is a 67 y.o. female with PMHx PAF on eliquis, SSS s/p ppm, braxton not on treatment, HTN, NIDDM2 admitted to [address] [place] [address] on 2022 for AF with rvr. She reports symptoms of palpitations and feeling unwell when her heart rate is elevated and atrial fibrillation. While in the ED, the patient received a bolus of Cardizem and had spontaneous conversion of her rhythm into sinus. Cardiology was consulted and she was seen by EP. Due to intolerances, she is no longer able to take amiodarone. Her Toprol was titrated to 50 mg twice daily and she remained in sinus during her stay. Thyroid studies were within normal limits. She is able to ambulate in the hallways with no symptoms of dizziness or chest pain/palpitations and was stable for discharge home. Case was discussed with cardiology, she will pursue an outpatient echocardiogram and stress test and see Dr. Valentin Mace in 2 weeks. He will consider ablation at that time. Records from [facility] will be sent to cardiology clinic for review. The patient was seen and examined on day of discharge and this discharge summary is in conjunction with any daily progress note from day of discharge.     The patient is discharged in stable condition. Exam:   Vitals:  Vitals:    01/01/23 2309 01/02/23 0336 01/02/23 0859 01/02/23 1125   BP: 115/67 (!) 103/54 133/63 133/65   Pulse: 71 75 68 73   Resp: 16 16 16 16   Temp: 97.7 °F (36.5 °C) 98.5 °F (36.9 °C) 97.7 °F (36.5 °C) 97.7 °F (36.5 °C)   TempSrc: Oral Oral Oral Oral   SpO2: 96% 98% 97% 97%   Weight:  142 lb 13.7 oz (64.8 kg)     Height:         Weight: Weight: 142 lb 13.7 oz (64.8 kg)     General appearance: No apparent distress, well developed, appears stated age. Eyes:  Pupils equal, round, and reactive to light. Conjunctivae/corneas clear. HENT: Head normal in appearance. External nares normal.  Oral mucosa moist without lesions. Hearing grossly intact. Neck: Supple, with full range of motion. Trachea midline. No gross JVD appreciated. Respiratory:  Normal respiratory effort. Clear to auscultation, bilaterally without rales or wheezes or rhonchi. Cardiovascular: Normal rate, regular rhythm with normal S1/S2 without murmurs. No lower extremity edema. Abdomen: Soft, non-tender, non-distended with normal bowel sounds. Musculoskeletal: There is no joint swelling or tenderness. Normal tone. No abnormal movements. Skin: Warm and dry. No rashes or lesions. Neurologic:  No focal sensory/motor deficits in the upper and lower extremities. Cranial nerves:  grossly non-focal 2-12. Psychiatric: Alert and oriented, normal insight and thought content. Significant Diagnostic Studies    Labs:  For convenience and continuity at follow-up the following most recent labs are provided:  CBC:    Lab Results   Component Value Date/Time    WBC 11.9 01/01/2023 03:38 AM    HGB 11.0 01/01/2023 03:38 AM    HCT 33.3 01/01/2023 03:38 AM     01/01/2023 03:38 AM     Renal:    Lab Results   Component Value Date/Time     01/01/2023 03:38 AM    K 4.5 01/01/2023 03:38 AM     01/01/2023 03:38 AM    CO2 26 01/01/2023 03:38 AM    BUN 19 01/01/2023 03:38 AM CREATININE 0.7 01/01/2023 03:38 AM    CALCIUM 9.2 01/01/2023 03:38 AM    PHOS 3.6 03/03/2021 01:18 AM       Radiology:   XR CHEST PORTABLE   Final Result   1. Normal heart size. Permanent dual-chamber pacemaker. Evidence for old, healed granulomatous disease. 2. No acute findings. No infiltrates or effusions are seen            **This report has been created using voice recognition software. It may contain minor errors which are inherent in voice recognition technology. **      Final report electronically signed by Dr. Ra Adler on 12/31/2022 7:35 PM             Consults:   IP CONSULT TO SOCIAL WORK  IP CONSULT TO ELECTROPHYSIOLOGY      Disposition: Home  Condition at Discharge: Stable    Code Status:  Prior full code    Patient Instructions:    Discharge lab work: Not applicable  Activity: activity as tolerated  Diet: No diet orders on file      Follow-up visits:   Karen Tripp MD  [facility] [address]  [phone]    Schedule an appointment as soon as possible for a visit in 2 week(s)  [facility]    [facility]  [address]  [phone]    Schedule an appointment as soon as possible for a visit in 1 week(s)  please call to make an appointment for 7-10- days         Discharge Medications:        Medication List        START taking these medications      pantoprazole 40 MG tablet  Commonly known as: PROTONIX  Take 1 tablet by mouth every morning (before breakfast)  Replaces: lansoprazole 30 MG delayed release capsule            CHANGE how you take these medications      clonazePAM 1 MG tablet  Commonly known as: KLONOPIN  Take 1 tablet by mouth 3 times daily as needed for Anxiety for up to 90 days. What changed: additional instructions     gabapentin 600 MG tablet  Commonly known as: NEURONTIN  Take 1 tablet by mouth 4 times daily for 152 days.  One tablet in AM, one tablet at lunch time, 2 tablets at bedtime  What changed: Another medication with the same name was removed. Continue taking this medication, and follow the directions you see here. metoprolol succinate 50 MG extended release tablet  Commonly known as: TOPROL XL  Take 1 tablet by mouth in the morning and at bedtime  What changed: when to take this            CONTINUE taking these medications      Janumet  MG per tablet  Generic drug: sitaGLIPtan-metFORMIN  Take 1 tablet by mouth 2 times daily (with meals)     levothyroxine 50 MCG tablet  Commonly known as: SYNTHROID  Take 1 tablet by mouth Daily     rivaroxaban 20 MG Tabs tablet  Commonly known as: XARELTO  Take 1 tablet by mouth daily (with breakfast)            STOP taking these medications      amiodarone 200 MG tablet  Commonly known as: CORDARONE     lansoprazole 30 MG delayed release capsule  Commonly known as: PREVACID  Replaced by: pantoprazole 40 MG tablet               Where to Get Your Medications        These medications were sent to [facility]  [facility] [address]      Phone: [phone]   metoprolol succinate 50 MG extended release tablet  pantoprazole 40 MG tablet       Information about where to get these medications is not yet available    Ask your nurse or doctor about these medications  rivaroxaban 20 MG Tabs tablet              Time Spent on discharge is 45 minutes in the examination, evaluation, counseling and review of medications and discharge plan. Thank you Aretha Snyder for the opportunity to be involved in this patient's care.       Signed:    Electronically signed by Jeremy Moore DO on 1/5/23 at 8:19 PM EST

## 2023-01-11 ENCOUNTER — TELEPHONE (OUTPATIENT)
Dept: CARDIOTHORACIC SURGERY | Age: 73
End: 2023-01-11

## 2023-01-11 NOTE — TELEPHONE ENCOUNTER
Pt called the office and said she is s/p afib ablation and she feels like she is back in afib and she is wanting to know if dr Landon Castorena will give her some medications to calm this down

## 2023-01-11 NOTE — TELEPHONE ENCOUNTER
Pt called the office back and she said it went away so she does not want to come into the office anymore. She said she will call our office back if it comes back . I did explain to the pt that if she has rvr then we need to slow the heart rate down . pt verbalizes understanding

## 2023-01-11 NOTE — TELEPHONE ENCOUNTER
I did call pt back and left her a message to call our office/ I spoke to dr Tarun Carmen and he said to bring her into the office for an ekg visit with the nurse    Sabino for pt to call this office back

## 2023-01-18 ENCOUNTER — OFFICE VISIT (OUTPATIENT)
Dept: CARDIOLOGY CLINIC | Age: 73
End: 2023-01-18
Payer: MEDICARE

## 2023-01-18 ENCOUNTER — NURSE ONLY (OUTPATIENT)
Dept: CARDIOLOGY CLINIC | Age: 73
End: 2023-01-18
Payer: MEDICARE

## 2023-01-18 VITALS
DIASTOLIC BLOOD PRESSURE: 74 MMHG | SYSTOLIC BLOOD PRESSURE: 126 MMHG | OXYGEN SATURATION: 97 % | HEIGHT: 64 IN | HEART RATE: 91 BPM | WEIGHT: 135 LBS | BODY MASS INDEX: 23.05 KG/M2

## 2023-01-18 DIAGNOSIS — Z95.0 PACEMAKER: Primary | ICD-10-CM

## 2023-01-18 DIAGNOSIS — I48.91 ATRIAL FIBRILLATION WITH RVR (HCC): Primary | ICD-10-CM

## 2023-01-18 DIAGNOSIS — Z95.0 PACEMAKER: ICD-10-CM

## 2023-01-18 PROCEDURE — 1123F ACP DISCUSS/DSCN MKR DOCD: CPT | Performed by: INTERNAL MEDICINE

## 2023-01-18 PROCEDURE — 93280 PM DEVICE PROGR EVAL DUAL: CPT | Performed by: INTERNAL MEDICINE

## 2023-01-18 PROCEDURE — 99214 OFFICE O/P EST MOD 30 MIN: CPT | Performed by: INTERNAL MEDICINE

## 2023-01-18 RX ORDER — FLECAINIDE ACETATE 50 MG/1
50 TABLET ORAL 2 TIMES DAILY
Qty: 60 TABLET | Refills: 5 | Status: SHIPPED | OUTPATIENT
Start: 2023-01-18

## 2023-01-18 NOTE — PATIENT INSTRUCTIONS
You may receive a survey regarding the care you received during your visit. Your input is valuable to us. We encourage you to complete and return your survey. We hope you will choose us in the future for your healthcare needs. Start Flecainide 50 mg twice a day. Let us know after a few weeks how you are feeling, [phone] opt 1.

## 2023-01-18 NOTE — PROGRESS NOTES
Medtronic dual pacer w/ [facility] implant 1st check with us   AF RVR     . Jenn Ahumadamarco antonio Battery longevity:  12.7 years   Presenting rhythm  AS VS     Atrial impedance 323  RV impedance 399    P wave sensing 1.1  R wave sensing 18.8    0.8 % atrial paced  0.1 % RV paced     Atrial threshold 0.5 V  at 0.4ms  RV threshold 1.25 V at 0.4ms  Mode switches   1.7% burden       Will call medtronic for home monitor

## 2023-01-18 NOTE — PROGRESS NOTES
[address] ([place])  [address] [address]  Dept: [phone]  Cardiac Electrophysiology: Follow-up note  Patient's demographics:  Date:   1/18/2023  Patient name:              Delmy Shipley  YOB: 1950  Sex:    female   MRN:   [id number]    Primary Care Physician:  Lorne Bui    Cardiologist:   Dr. Ashley Naranjo at [facility].     Reason for Follow up:  Recent hospitalization for atrial fibrillation. Clinical Summary:  72/female with history of proximal atrial fibrillation since 2018 has been experiencing recurrent episodes of palpitations that triggered hospital admission on 1/1/2023. She converted spontaneously to sinus rhythm. I saw her inpatient consultation for evaluation of catheter ablation. At that time patient elected to continue metoprolol. Here for a follow-up visit. Feeling better following her hospital discharge. Continues to have intermittent palpitations 1-2 times a week, short lasting. No chest pain shortness of breath or syncope. Medcial Hx: PAF dx 2015 on metoprolol and Xeralto, intolerant to amiodarone (headache), SSS/DCPM (9/2020), HTN, HPL, untreated BRAXTON, DM2 with sensroy neuropathy, hypothyroidism, GERD, fibromyalgia and osteoarthritis. Review of systems:  Constitutional: Negative for chills and fever  HENT: Negative for congestion, sinus pressure, sneezing and sore throat. Eyes: Negative for pain, discharge, redness and itching. Respiratory: Negative for apnea, cough  Gastrointestinal: Negative for blood in stool, constipation, diarrhea   Endocrine: Negative for cold intolerance, heat intolerance, polydipsia. Genitourinary: Negative for dysuria, enuresis, flank pain and hematuria. Musculoskeletal: Negative for arthralgias, joint swelling and neck pain. Neurological: Negative for numbness and headaches.    Psychiatric/Behavioral: Negative for agitation, confusion, decreased concentration and dysphoric mood. Past Medical History[de-identified]  Past Medical History:   Diagnosis Date    Anxiety     Arthritis     Atrial fibrillation (HCC)     Fibromyalgia     GERD (gastroesophageal reflux disease)     Hyperlipidemia     Hypertension     Hypothyroidism     Neuropathy     arms and legs    Osteoporosis     Type 2 diabetes mellitus without complication ([address]        Past Surgical History:  Past Surgical History:   Procedure Laterality Date    APPENDECTOMY      BLADDER SURGERY      nerve block placed per pt x 3    BLADDER SUSPENSION      CARPAL TUNNEL RELEASE Right     CHOLECYSTECTOMY      COLONOSCOPY  ,     CYST REMOVAL      HYSTERECTOMY (CERVIX STATUS UNKNOWN)      PACEMAKER INSERTION  2020    Dr Devika Hernandez ENDOSCOPY  [id number]    UPPER GASTROINTESTINAL ENDOSCOPY         Family History:  Family History   Problem Relation Age of Onset    Arthritis Mother     Miscarriages / Murrel Maria E Mother     Lung Cancer Mother 80    Hearing Loss Father     Heart Disease Father     High Blood Pressure Father     Cancer Sister 67        throat cancer    Stomach Cancer Sister     Other Brother         throat cancer    Diabetes Paternal Aunt     Other Brother         throat cancer    Other Brother         throat cancer    Uterine Cancer Sister         Social History:  Social History     Socioeconomic History    Marital status:    Tobacco Use    Smoking status: Former     Packs/day: 0.25     Years: 4.00     Pack years: 1.00     Types: Cigarettes     Quit date: 1989     Years since quittin.0    Smokeless tobacco: Never    Tobacco comments:     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   Vaping Use    Vaping Use: Never used   Substance and Sexual Activity    Alcohol use: Yes     Comment: very rarely    Drug use: No        Allergies:   Allergies   Allergen Reactions    Adhesive Tape Other (See Comments)     redness    Codeine     Sulfa Antibiotics Medications:  Current Outpatient Medications   Medication Sig Dispense Refill    rivaroxaban (XARELTO) 20 MG TABS tablet Take 1 tablet by mouth daily (with breakfast) 30 tablet 2    metoprolol succinate (TOPROL XL) 50 MG extended release tablet Take 1 tablet by mouth in the morning and at bedtime 30 tablet 3    pantoprazole (PROTONIX) 40 MG tablet Take 1 tablet by mouth every morning (before breakfast) 30 tablet 3    clonazePAM (KLONOPIN) 1 MG tablet Take 1 tablet by mouth 3 times daily as needed for Anxiety for up to 90 days. (Patient taking differently: Take 1 mg by mouth 3 times daily as needed for Anxiety. One in AM two at night.) 270 tablet 0    levothyroxine (SYNTHROID) 50 MCG tablet Take 1 tablet by mouth Daily 90 tablet 0    JANUMET  MG per tablet Take 1 tablet by mouth 2 times daily (with meals) 60 tablet 0    gabapentin (NEURONTIN) 600 MG tablet Take 1 tablet by mouth 4 times daily for 152 days. One tablet in AM, one tablet at lunch time, 2 tablets at bedtime 360 tablet 1     No current facility-administered medications for this visit. Physical Examination:  Vitals:    01/18/23 1445   BP: 126/74   Pulse: 91   SpO2: 97%       GENERAL: Alert and oriented. No distress. EYES: No pallor or icterus. ENT: No cyanosis. No thyromegaly or cervical LAP. VESSELS: No jugular venous distension or carotid bruits. HEART: Normal S1/S2. No murmur, rub or gallop. LUNGS: Clear to auscultation. CHEST WALL: No erosions, discharge or swelling at device site. ABDOMEN: Soft and non-tender. EXTREMITIES: No lower extremity edema. Feet are warm.    NEUROLOGICAL: Grossly normal.     Laboratory And Diagnostic Data  I have personally reviewed and interpreted the results of the following diagnostic testing    Lab Results   Component Value Date    WBC 11.9 (H) 01/01/2023    HGB 11.0 (L) 01/01/2023    HCT 33.3 (L) 01/01/2023     01/01/2023    CHOL 246 (H) 12/31/2020    TRIG 415 (H) 12/31/2020    HDL 48 12/31/2020    LDLDIRECT 159 (H) 12/31/2020    ALT 11 12/31/2022    AST 13 12/31/2022     01/01/2023    K 4.5 01/01/2023     01/01/2023    CREATININE 0.7 01/01/2023    BUN 19 01/01/2023    CO2 26 01/01/2023    TSH 2.400 12/31/2022    INR 2.73 (H) 09/07/2020    LABA1C 8.7 (H) 01/01/2023    LABMICR 19.1 11/07/2019      Echocardiogram 1/5/2023: LVEF 55 to 60%, LVWT 1 cm, BRAD 27.6. No significant valvular abnormalities. ECG AF with RVR (169 BPM) and normal QTc and QRS. Nuclear stress test 1/5/2023: Negative for ischemia. Pacemaker interrogations: Medtronic, dual-chamber, longevity 12.7 years AT/AF 1.7%, AP/ is less than 1%. Impression:  Paroxysmal symptomatic atrial fibrillation. [id number] score 4 age, sex, HTN and DM). On Xarelto and metoprolol. .   SSS/DCPM, normal functions. HTN, controlled. DM, on combination therapy. BRAXTON not on CPAP (intolerance). Assessment And Recommendations: The patient has been feeling better following her hospital discharge. Her AT/AF burden is around 1.7%. We will start her on flecainide 50 mg twice daily. Continue anticoagulation with apixaban. Follow-up in 6 months. **This report has been created using voice recognition software. It may contain minor errors which are inherent in voice recognition technology. **       Sarai Luis MD, MRCP, [facility], [facility] on 1/18/2023 at 3:09 PM

## 2023-01-31 ENCOUNTER — APPOINTMENT (OUTPATIENT)
Dept: CT IMAGING | Age: 73
End: 2023-01-31
Payer: MEDICARE

## 2023-01-31 ENCOUNTER — HOSPITAL ENCOUNTER (EMERGENCY)
Age: 73
Discharge: HOME | End: 2023-02-01
Attending: EMERGENCY MEDICINE
Payer: MEDICARE

## 2023-01-31 ENCOUNTER — APPOINTMENT (OUTPATIENT)
Dept: GENERAL RADIOLOGY | Age: 73
End: 2023-01-31
Payer: MEDICARE

## 2023-01-31 DIAGNOSIS — R00.0 TACHYCARDIA: Primary | ICD-10-CM

## 2023-01-31 LAB
ALBUMIN SERPL BCG-MCNC: 4.1 G/DL (ref 3.5–5.1)
ALP SERPL-CCNC: 60 U/L (ref 38–126)
ALT SERPL W/O P-5'-P-CCNC: 7 U/L (ref 11–66)
ANION GAP SERPL CALC-SCNC: 13 MEQ/L (ref 8–16)
APTT PPP: 26.3 SECONDS (ref 22–38)
AST SERPL-CCNC: 11 U/L (ref 5–40)
BASOPHILS ABSOLUTE: 0 THOU/MM3 (ref 0–0.1)
BASOPHILS NFR BLD AUTO: 0.3 %
BILIRUB CONJ SERPL-MCNC: < 0.2 MG/DL (ref 0–0.3)
BILIRUB SERPL-MCNC: 0.4 MG/DL (ref 0.3–1.2)
BUN SERPL-MCNC: 20 MG/DL (ref 7–22)
CALCIUM SERPL-MCNC: 9 MG/DL (ref 8.5–10.5)
CHLORIDE SERPL-SCNC: 99 MEQ/L (ref 98–111)
CO2 SERPL-SCNC: 24 MEQ/L (ref 23–33)
CREAT SERPL-MCNC: 1 MG/DL (ref 0.4–1.2)
DEPRECATED RDW RBC AUTO: 40.2 FL (ref 35–45)
EOSINOPHIL NFR BLD AUTO: 0.5 %
EOSINOPHILS ABSOLUTE: 0 THOU/MM3 (ref 0–0.4)
ERYTHROCYTE [DISTWIDTH] IN BLOOD BY AUTOMATED COUNT: 13.4 % (ref 11.5–14.5)
GFR SERPL CREATININE-BSD FRML MDRD: 60 ML/MIN/1.73M2
GLUCOSE SERPL-MCNC: 228 MG/DL (ref 70–108)
HCT VFR BLD AUTO: 31 % (ref 37–47)
HGB BLD-MCNC: 10.3 GM/DL (ref 12–16)
IMM GRANULOCYTES # BLD AUTO: 0.02 THOU/MM3 (ref 0–0.07)
IMM GRANULOCYTES NFR BLD AUTO: 0.2 %
INR PPP: 1.09 (ref 0.85–1.13)
LIPASE SERPL-CCNC: 59.5 U/L (ref 5.6–51.3)
LYMPHOCYTES ABSOLUTE: 3.6 THOU/MM3 (ref 1–4.8)
LYMPHOCYTES NFR BLD AUTO: 41.3 %
MAGNESIUM SERPL-MCNC: 1.3 MG/DL (ref 1.6–2.4)
MCH RBC QN AUTO: 27.6 PG (ref 26–33)
MCHC RBC AUTO-ENTMCNC: 33.2 GM/DL (ref 32.2–35.5)
MCV RBC AUTO: 83.1 FL (ref 81–99)
MONOCYTES ABSOLUTE: 0.4 THOU/MM3 (ref 0.4–1.3)
MONOCYTES NFR BLD AUTO: 4.6 %
NEUTROPHILS NFR BLD AUTO: 53.1 %
NRBC BLD AUTO-RTO: 0 /100 WBC
OSMOLALITY SERPL CALC.SUM OF ELEC: 281.8 MOSMOL/KG (ref 275–300)
PLATELET # BLD AUTO: 219 THOU/MM3 (ref 130–400)
PMV BLD AUTO: 9.1 FL (ref 9.4–12.4)
POTASSIUM SERPL-SCNC: 3.5 MEQ/L (ref 3.5–5.2)
PROT SERPL-MCNC: 7 G/DL (ref 6.1–8)
RBC # BLD AUTO: 3.73 MILL/MM3 (ref 4.2–5.4)
SEGMENTED NEUTROPHILS ABSOLUTE COUNT: 4.7 THOU/MM3 (ref 1.8–7.7)
SODIUM SERPL-SCNC: 136 MEQ/L (ref 135–145)
TROPONIN T: < 0.01 NG/ML
TROPONIN T: < 0.01 NG/ML
WBC # BLD AUTO: 8.8 THOU/MM3 (ref 4.8–10.8)

## 2023-01-31 PROCEDURE — 80076 HEPATIC FUNCTION PANEL: CPT

## 2023-01-31 PROCEDURE — 6370000000 HC RX 637 (ALT 250 FOR IP): Performed by: EMERGENCY MEDICINE

## 2023-01-31 PROCEDURE — 80048 BASIC METABOLIC PNL TOTAL CA: CPT

## 2023-01-31 PROCEDURE — 96374 THER/PROPH/DIAG INJ IV PUSH: CPT

## 2023-01-31 PROCEDURE — 85730 THROMBOPLASTIN TIME PARTIAL: CPT

## 2023-01-31 PROCEDURE — 93005 ELECTROCARDIOGRAM TRACING: CPT | Performed by: EMERGENCY MEDICINE

## 2023-01-31 PROCEDURE — 99285 EMERGENCY DEPT VISIT HI MDM: CPT

## 2023-01-31 PROCEDURE — 2580000003 HC RX 258: Performed by: EMERGENCY MEDICINE

## 2023-01-31 PROCEDURE — 36415 COLL VENOUS BLD VENIPUNCTURE: CPT

## 2023-01-31 PROCEDURE — 96361 HYDRATE IV INFUSION ADD-ON: CPT

## 2023-01-31 PROCEDURE — 71275 CT ANGIOGRAPHY CHEST: CPT

## 2023-01-31 PROCEDURE — 71046 X-RAY EXAM CHEST 2 VIEWS: CPT

## 2023-01-31 PROCEDURE — 83735 ASSAY OF MAGNESIUM: CPT

## 2023-01-31 PROCEDURE — 6360000004 HC RX CONTRAST MEDICATION: Performed by: EMERGENCY MEDICINE

## 2023-01-31 PROCEDURE — 2500000003 HC RX 250 WO HCPCS: Performed by: EMERGENCY MEDICINE

## 2023-01-31 PROCEDURE — 84484 ASSAY OF TROPONIN QUANT: CPT

## 2023-01-31 PROCEDURE — 85025 COMPLETE CBC W/AUTO DIFF WBC: CPT

## 2023-01-31 PROCEDURE — 85610 PROTHROMBIN TIME: CPT

## 2023-01-31 PROCEDURE — 83690 ASSAY OF LIPASE: CPT

## 2023-01-31 RX ORDER — DILTIAZEM HYDROCHLORIDE 5 MG/ML
5 INJECTION INTRAVENOUS ONCE
Status: COMPLETED | OUTPATIENT
Start: 2023-01-31 | End: 2023-01-31

## 2023-01-31 RX ORDER — LANOLIN ALCOHOL/MO/W.PET/CERES
800 CREAM (GRAM) TOPICAL ONCE
Status: COMPLETED | OUTPATIENT
Start: 2023-01-31 | End: 2023-01-31

## 2023-01-31 RX ORDER — 0.9 % SODIUM CHLORIDE 0.9 %
1000 INTRAVENOUS SOLUTION INTRAVENOUS ONCE
Status: COMPLETED | OUTPATIENT
Start: 2023-01-31 | End: 2023-01-31

## 2023-01-31 RX ADMIN — Medication 800 MG: at 22:07

## 2023-01-31 RX ADMIN — IOPAMIDOL 80 ML: 755 INJECTION, SOLUTION INTRAVENOUS at 23:27

## 2023-01-31 RX ADMIN — DILTIAZEM HYDROCHLORIDE 5 MG: 5 INJECTION INTRAVENOUS at 22:51

## 2023-01-31 RX ADMIN — SODIUM CHLORIDE 1000 ML: 9 INJECTION, SOLUTION INTRAVENOUS at 22:01

## 2023-01-31 ASSESSMENT — PAIN - FUNCTIONAL ASSESSMENT
PAIN_FUNCTIONAL_ASSESSMENT: NONE - DENIES PAIN
PAIN_FUNCTIONAL_ASSESSMENT: NONE - DENIES PAIN

## 2023-01-31 ASSESSMENT — ENCOUNTER SYMPTOMS
CONSTIPATION: 0
NAUSEA: 0
SHORTNESS OF BREATH: 0
EYE REDNESS: 0
BLOOD IN STOOL: 0
ABDOMINAL PAIN: 0
RHINORRHEA: 0
VOICE CHANGE: 0
WHEEZING: 0
SINUS PRESSURE: 0
EYE DISCHARGE: 0
BACK PAIN: 0
CHOKING: 0
EYE PAIN: 0
CHEST TIGHTNESS: 0
COUGH: 0
DIARRHEA: 0
TROUBLE SWALLOWING: 0
SORE THROAT: 0
EYE ITCHING: 0
PHOTOPHOBIA: 0
ABDOMINAL DISTENTION: 0
VOMITING: 0

## 2023-02-01 VITALS
DIASTOLIC BLOOD PRESSURE: 68 MMHG | SYSTOLIC BLOOD PRESSURE: 134 MMHG | TEMPERATURE: 97.5 F | HEART RATE: 94 BPM | WEIGHT: 138 LBS | RESPIRATION RATE: 16 BRPM | HEIGHT: 64 IN | BODY MASS INDEX: 23.56 KG/M2 | OXYGEN SATURATION: 97 %

## 2023-02-01 LAB
EKG ATRIAL RATE: 125 BPM
EKG P AXIS: 44 DEGREES
EKG P-R INTERVAL: 200 MS
EKG Q-T INTERVAL: 298 MS
EKG QRS DURATION: 60 MS
EKG QTC CALCULATION (BAZETT): 430 MS
EKG R AXIS: 49 DEGREES
EKG T AXIS: 71 DEGREES
EKG VENTRICULAR RATE: 125 BPM

## 2023-02-01 PROCEDURE — 93010 ELECTROCARDIOGRAM REPORT: CPT | Performed by: INTERNAL MEDICINE

## 2023-02-01 ASSESSMENT — HEART SCORE: ECG: 1

## 2023-02-01 NOTE — DISCHARGE INSTRUCTIONS
Patient today had what was a mild tachyarrhythmia. She does have history of atrial fibrillation. She is instructed take all medications as prescribed. She is instructed to follow-up with her cardiologist and call for an appointment for evaluation and possible titration of medication. She is instructed take all medications as prescribed. She is instructed return to the nearest emergency room immediately for any new or worsening complaints.

## 2023-02-01 NOTE — ED NOTES
Discharge instructions and follow up discussed with pt. Pt verbalized understanding and denied further questions. LDA removed. Pt discharged with all belongings.         Efren Tovar RN  02/01/23 9042

## 2023-02-01 NOTE — ED TRIAGE NOTES
Pt presents to the ed with c/o being in afib. Pt states that it started around 1830 this evening. PT reports having a headache since it started. Ekg complete. Tele placed.

## 2023-02-01 NOTE — ED PROVIDER NOTES
[facility]  eMERGENCY dEPARTMENT eNCOUnter          CHIEF COMPLAINT       Chief Complaint   Patient presents with    Chest Pain       Nurses Notes reviewed and I agree except as noted in the HPI. HISTORY OF PRESENT ILLNESS    Son Lazo is a 67 y.o. female who presents tachycardia possible atrial fibrillation, mild chest pressure. Patient does have a history of atrial fibrillation and sees Dr. Malia Dahl for this. She does have a pacemaker in place. Is Medtronic. Patient states that she felt her heart rate going up today. She states she has been taking all her medications. She states that she has been talking to her cardiologist and they are talking about an ablation. Patient states she has not had any alcohol or drugs. She has not been under any real significant stress. However she does add that she just lost her  and lost 2 dogs. Patient states she has been eating and drinking okay. Denies any recent illness. States her blood sugars have been under control. Patient is not having any radiation referral to pressure. She states she is feeling much better since she came in the door. Patient is otherwise resting comfortably on the cot no apparent distress no other physical complaints at this time. Echocardiogram  Conclusions      Summary   Normal left ventricle size and systolic function. Ejection fraction was   estimated at 55 to 60 %. There were no regional left ventricular wall   motion abnormalities and wall thickness was within normal limits. Doppler parameters were consistent with abnormal left ventricular   relaxation (grade 1 diastolic dysfunction). The left atrium is Mildly dilated.       Signature      ----------------------------------------------------------------   Electronically signed by Jeremías Molina MD (Interpreting   physician) on 01/07/2023 at 04:17 PM    REVIEW OF SYSTEMS     Review of Systems   Constitutional:  Negative for activity change, appetite change, diaphoresis, fatigue and unexpected weight change. HENT:  Negative for congestion, ear discharge, ear pain, hearing loss, rhinorrhea, sinus pressure, sore throat, trouble swallowing and voice change. Eyes:  Negative for photophobia, pain, discharge, redness and itching. Respiratory:  Negative for cough, choking, chest tightness, shortness of breath and wheezing. Cardiovascular:  Positive for chest pain. Negative for palpitations and leg swelling. Gastrointestinal:  Negative for abdominal distention, abdominal pain, blood in stool, constipation, diarrhea, nausea and vomiting. Endocrine: Negative for polydipsia, polyphagia and polyuria. Genitourinary:  Negative for decreased urine volume, difficulty urinating, dysuria, enuresis, frequency, hematuria and urgency. Musculoskeletal:  Negative for arthralgias, back pain, gait problem, myalgias, neck pain and neck stiffness. Skin:  Negative for pallor and rash. Allergic/Immunologic: Negative for immunocompromised state. Neurological:  Negative for dizziness, tremors, seizures, syncope, facial asymmetry, weakness, light-headedness, numbness and headaches. Hematological:  Negative for adenopathy. Does not bruise/bleed easily. Psychiatric/Behavioral:  Negative for agitation, hallucinations and suicidal ideas. The patient is not nervous/anxious. PAST MEDICAL HISTORY    has a past medical history of Anxiety, Arthritis, Atrial fibrillation (HCC), Fibromyalgia, GERD (gastroesophageal reflux disease), Hyperlipidemia, Hypertension, Hypothyroidism, Medtronic dual pacer , Neuropathy, Osteoporosis, and Type 2 diabetes mellitus without complication ([facility] [address] SURGICAL HISTORY      has a past surgical history that includes Hysterectomy; cyst removal; Cholecystectomy; Appendectomy; bladder suspension; Colonoscopy (2010, 2013); Tubal ligation; Carpal tunnel release (Right); Bladder surgery;  Upper gastrointestinal endoscopy (7042,5146); Upper gastrointestinal endoscopy; and Pacemaker insertion (2020). CURRENT MEDICATIONS       Discharge Medication List as of 2023 12:57 AM        CONTINUE these medications which have NOT CHANGED    Details   flecainide (TAMBOCOR) 50 MG tablet Take 1 tablet by mouth 2 times daily, Disp-60 tablet, R-5Normal      rivaroxaban (XARELTO) 20 MG TABS tablet Take 1 tablet by mouth daily (with breakfast), Disp-30 tablet, R-2Adjust Sig      metoprolol succinate (TOPROL XL) 50 MG extended release tablet Take 1 tablet by mouth in the morning and at bedtime, Disp-30 tablet, R-3Normal      pantoprazole (PROTONIX) 40 MG tablet Take 1 tablet by mouth every morning (before breakfast), Disp-30 tablet, R-3Normal      clonazePAM (KLONOPIN) 1 MG tablet Take 1 tablet by mouth 3 times daily as needed for Anxiety for up to 90 days. , Disp-270 tablet, R-0Normal      levothyroxine (SYNTHROID) 50 MCG tablet Take 1 tablet by mouth Daily, Disp-90 tablet, R-0Normal      JANUMET  MG per tablet Take 1 tablet by mouth 2 times daily (with meals), Disp-60 tablet, R-0Normal      gabapentin (NEURONTIN) 600 MG tablet Take 1 tablet by mouth 4 times daily for 152 days. One tablet in AM, one tablet at lunch time, 2 tablets at bedtime, Disp-360 tablet, R-1Normal             ALLERGIES     is allergic to adhesive tape, codeine, and sulfa antibiotics. FAMILY HISTORY     She indicated that her mother is . She indicated that her father is . She indicated that only one of her three sisters is alive. She indicated that all of her three brothers are . She indicated that the status of her paternal aunt is unknown.   family history includes Arthritis in her mother; Cancer (age of onset: 67) in her sister; Diabetes in her paternal aunt; Hearing Loss in her father; Heart Disease in her father; High Blood Pressure in her father; Lung Cancer (age of onset: 80) in her mother; Dionte Jonhson / Eugenio Garcia in her mother;  Other in her brother, brother, and brother; Ko Michaels in her sister; Uterine Cancer in her sister. SOCIAL HISTORY      reports that she quit smoking about 34 years ago. Her smoking use included cigarettes. She has a 1.00 pack-year smoking history. She has never used smokeless tobacco. She reports current alcohol use. She reports that she does not use drugs. PHYSICAL EXAM     INITIAL VITALS:  height is 5' 4\" (1.626 m) and weight is 138 lb (62.6 kg). Her oral temperature is 97.5 °F (36.4 °C). Her blood pressure is 134/68 and her pulse is 94. Her respiration is 16 and oxygen saturation is 97%. Physical Exam  Vitals and nursing note reviewed. Constitutional:       General: She is not in acute distress. Appearance: She is well-developed. She is not diaphoretic. HENT:      Head: Normocephalic and atraumatic. Right Ear: External ear normal.      Left Ear: External ear normal.      Nose: Nose normal.      Mouth/Throat:      Pharynx: No oropharyngeal exudate. Eyes:      General: No scleral icterus. Right eye: No discharge. Left eye: No discharge. Conjunctiva/sclera: Conjunctivae normal.      Pupils: Pupils are equal, round, and reactive to light. Neck:      Thyroid: No thyromegaly. Vascular: No JVD. Trachea: No tracheal deviation. Cardiovascular:      Rate and Rhythm: Regular rhythm. Tachycardia present. Pulses:           Carotid pulses are 2+ on the right side and 2+ on the left side. Radial pulses are 2+ on the right side and 2+ on the left side. Femoral pulses are 2+ on the right side and 2+ on the left side. Popliteal pulses are 2+ on the right side and 2+ on the left side. Dorsalis pedis pulses are 2+ on the right side and 2+ on the left side. Posterior tibial pulses are 2+ on the right side and 2+ on the left side. Heart sounds: Normal heart sounds, S1 normal and S2 normal. No murmur heard. No friction rub. No gallop. Pulmonary:      Effort: Pulmonary effort is normal.      Breath sounds: Normal breath sounds. No stridor. No wheezing, rhonchi or rales. Chest:      Chest wall: No tenderness. Abdominal:      General: Bowel sounds are normal. There is no distension. Palpations: Abdomen is soft. There is no mass. Tenderness: There is no abdominal tenderness. There is no guarding or rebound. Hernia: No hernia is present. Musculoskeletal:         General: No tenderness. Normal range of motion. Cervical back: Normal range of motion and neck supple. Right lower leg: No edema. Left lower leg: No edema. Lymphadenopathy:      Cervical: No cervical adenopathy. Skin:     General: Skin is warm and dry. Findings: No bruising, ecchymosis, lesion or rash. Neurological:      Mental Status: She is alert and oriented to person, place, and time. Cranial Nerves: No cranial nerve deficit. Coordination: Coordination normal.      Deep Tendon Reflexes: Reflexes are normal and symmetric. Psychiatric:         Speech: Speech normal.         Behavior: Behavior normal.         Thought Content: Thought content normal.         Judgment: Judgment normal.         DIFFERENTIAL DIAGNOSIS:   Atrial fibrillation, SVT, sinus tachycardia, ventricular tachycardia, ACS    DIAGNOSTIC RESULTS     EKG: All EKG's are interpreted by the Emergency Department Physician who either signs or Co-signs this chart in the absence of a cardiologist.  Sinus tachycardia with premature supraventricular complexes, ventricular rate of 125 OK interval 200 QRS duration 60 QT interval 298 QTC of 430. RADIOLOGY: non-plain film images(s) such as CT, Ultrasound and MRI are read by the radiologist.  CTA CHEST W WO CONTRAST   Final Result   Impression:   1. Negative for acute pulmonary embolism. 2. Old granulomatous disease. 3. Negative for pulmonary mass, or suspicious pulmonary nodules greater    than 0.6 cm.       This document has been electronically signed by: Jagdish Calabrese MD on    02/01/2023 12:14 AM      All CTs at this facility use dose modulation techniques and iterative    reconstructions, and/or weight-based dosing   when appropriate to reduce radiation to a low as reasonably achievable.      3D Post-processing was performed on this study.      XR CHEST (2 VW)   Final Result   Impression:   1. Nodular density right apex, new since the prior study. Differential    diagnosis include infiltrate, atelectasis, pulmonary nodule. Consider    follow-up with nonemergent CT chest.   2. No CHF.   3. Old granulomatous disease.      This document has been electronically signed by: Jagdish Calabrese MD on    01/31/2023 09:55 PM            LABS:   Labs Reviewed   CBC WITH AUTO DIFFERENTIAL - Abnormal; Notable for the following components:       Result Value    RBC 3.73 (*)     Hemoglobin 10.3 (*)     Hematocrit 31.0 (*)     MPV 9.1 (*)     All other components within normal limits   BASIC METABOLIC PANEL - Abnormal; Notable for the following components:    Glucose 228 (*)     All other components within normal limits   HEPATIC FUNCTION PANEL - Abnormal; Notable for the following components:    ALT 7 (*)     All other components within normal limits   LIPASE - Abnormal; Notable for the following components:    Lipase 59.5 (*)     All other components within normal limits   MAGNESIUM - Abnormal; Notable for the following components:    Magnesium 1.3 (*)     All other components within normal limits   TROPONIN   PROTIME-INR   APTT   ANION GAP   GLOMERULAR FILTRATION RATE, ESTIMATED   OSMOLALITY   TROPONIN       EMERGENCY DEPARTMENT COURSE:   Vitals:    Vitals:    01/31/23 2251 01/31/23 2252 01/31/23 2323 02/01/23 0030   BP: 121/69 121/69 130/68 134/68   Pulse: (!) 104 (!) 103 98 94   Resp:  18 14 16   Temp:       TempSrc:       SpO2:  99% 98% 97%   Weight:       Height:         Patient was assessed at bedside labs and imaging were ordered.  Patient was not  in atrial fibrillation here. EKG was revealing of that fact it appears to be a sinus tachycardia. Here today patient does not have any real chest pain. She said she had a heaviness which she often associates with atrial fibrillation. In any event the patient's heart score is a 4. Patient was given Cardizem. Her heart rate came back down to normal.  Blood pressure remained stable. Patient had 2 sets of negative troponins. Heart rate came down patient reported complete resolution of symptoms. Rest of the patient's labs are within normal limits. At this point I feel the patient had a bout of tachycardia. I did order CTA of the chest which was negative. I feel the patient be safely discharged home. She is instructed to follow-up with her cardiologist and call for an appointment within the next 1 to 2 days. She is instructed to stay well-hydrated drinking plenty of fluids. She is instructed return to the nearest emergency room immediately for any new or worsening complaints. I discussed this extensively at bedside with the patient and friend who understood and agreed with the plan. There are no barriers to the patient following up. Patient is subsequently discharged home in stable condition.         Heart Score    Heart Score for chest pain patients  History: Slightly Suspicious  ECG: Non-Specifc repolarization disturbance/LBTB/PM  Patient Age: > 65 years  *Risk factors for Atherosclerotic disease: Hypertension, Diabetes Mellitus  Risk Factors: 1 or 2 risk factors  Troponin: < 1X normal limit  Heart Score Total: 4    HEART Score recommendations:  Score 0 - 3:   <1.7%  risk of MACE over next 6 wks = Outpatient workup  Score 4 - 6: 12-16% risk of MACE over next 6 wks = Admission for observation  Score 7- 10: 50-65% risk of MACE over next 6 wks = Early invasive strategy    MACE: Major Acute Cardiac Event (All Cause Mortality, AMI or revascularization)  Chest Pain in the Emergency Room: A Multicenter Validation of the HEART Score. Pedro BE, Will AJ, Katharine KAYLA, Delmer TP, [employer], Delmer EG, Tere SH, [facility]. Crit Pathw Cardiol. 2010 Sep; 9(3): 164-169. \"A prospective validation of the HEART score for chest pain patients at the emergency department. \" Int J Cardiol. 2013 Oct 3;168(3):2153-8. doi: 10.1016/j.ijcard. 2013.01.255. Epub 2013 Mar 7. Patient today had what was a mild tachyarrhythmia. She does have history of atrial fibrillation. She is instructed take all medications as prescribed. She is instructed to follow-up with her cardiologist and call for an appointment for evaluation and possible titration of medication. She is instructed take all medications as prescribed. She is instructed return to the nearest emergency room immediately for any new or worsening complaints. CRITICAL CARE:   None    CONSULTS:  None    PROCEDURES:  None    FINAL IMPRESSION      1.  Tachycardia          DISPOSITION/PLAN   Discharge    PATIENT REFERRED TO:  Yoni Valdivia MD  [address]  [phone]    Call in 2 days      DISCHARGE MEDICATIONS:  Discharge Medication List as of 2/1/2023 12:57 AM          (Please note that portions of this note were completed with a voice recognition program.  Efforts were made to edit the dictations but occasionally words are mis-transcribed.)    Carrillo Canales, [address],   02/01/23 5196

## 2023-02-01 NOTE — ED NOTES
Report received from [facility]. Pt ambulated to restroom with steady gait. Denies further needs at this time. Call light within reach.      Jenna Ga RN  01/31/23 7999

## 2023-02-01 NOTE — ED NOTES
Patient resting in bed. Respirations easy and unlabored. No distress noted. Call light within reach.        Nasra Anderson RN  02/01/23 2946

## 2023-02-01 NOTE — ED NOTES
ED nurse-to-nurse bedside report    Chief Complaint   Patient presents with    Chest Pain      LOC: alert and orientated to name, place, date  Vital signs   Vitals:    01/31/23 2121 01/31/23 2202 01/31/23 2251 01/31/23 2252   BP: 130/79 115/75 121/69 121/69   Pulse: (!) 117 (!) 116 (!) 104 (!) 103   Resp: 21 17 18   Temp:       TempSrc:       SpO2: 99% 95%  99%   Weight:       Height:          Pain:    Pain Interventions: none  Pain Goal: 0  Oxygen: No    Current needs required room air  Telemetry: Yes  LDAs:   Peripheral IV 01/31/23 Left Antecubital (Active)   Site Assessment Clean, dry & intact 01/31/23 2253   Line Status Normal saline locked; Flushed 01/31/23 2253     Continuous Infusions:   Mobility: Independent  Fong Fall Risk Score: Fall Risk 5/19/2020 10/21/2019 7/15/2015   2 or more falls in past year?  yes no no   Fall with injury in past year? no no no     Fall Interventions: call light  Report given to: MIGUE [employer], RN  01/31/23 0851

## 2023-02-23 ENCOUNTER — APPOINTMENT (OUTPATIENT)
Dept: GENERAL RADIOLOGY | Age: 73
End: 2023-02-23
Payer: MEDICARE

## 2023-02-23 ENCOUNTER — HOSPITAL ENCOUNTER (OUTPATIENT)
Age: 73
Setting detail: OBSERVATION
Discharge: HOME | End: 2023-02-27
Attending: EMERGENCY MEDICINE
Payer: MEDICARE

## 2023-02-23 ENCOUNTER — APPOINTMENT (OUTPATIENT)
Dept: CT IMAGING | Age: 73
End: 2023-02-23
Payer: MEDICARE

## 2023-02-23 DIAGNOSIS — U07.1 COVID: Primary | ICD-10-CM

## 2023-02-23 DIAGNOSIS — N30.00 ACUTE CYSTITIS WITHOUT HEMATURIA: ICD-10-CM

## 2023-02-23 LAB
ANION GAP SERPL CALC-SCNC: 18 MEQ/L (ref 8–16)
BACTERIA URNS QL MICRO: ABNORMAL /HPF
BASOPHILS ABSOLUTE: 0.1 THOU/MM3 (ref 0–0.1)
BASOPHILS NFR BLD AUTO: 0.4 %
BILIRUB UR QL STRIP.AUTO: NEGATIVE
BUN SERPL-MCNC: 15 MG/DL (ref 7–22)
CALCIUM SERPL-MCNC: 9 MG/DL (ref 8.5–10.5)
CASTS #/AREA URNS LPF: ABNORMAL /LPF
CASTS 2: ABNORMAL /LPF
CHARACTER UR: ABNORMAL
CHLORIDE SERPL-SCNC: 96 MEQ/L (ref 98–111)
CO2 SERPL-SCNC: 22 MEQ/L (ref 23–33)
COLOR: ABNORMAL
CREAT SERPL-MCNC: 0.9 MG/DL (ref 0.4–1.2)
CRP SERPL-MCNC: 12.5 MG/DL (ref 0–1)
CRYSTALS URNS MICRO: ABNORMAL
DEPRECATED RDW RBC AUTO: 43.2 FL (ref 35–45)
EOSINOPHIL NFR BLD AUTO: 0 %
EOSINOPHILS ABSOLUTE: 0 THOU/MM3 (ref 0–0.4)
EPITHELIAL CELLS, UA: ABNORMAL /HPF
ERYTHROCYTE [DISTWIDTH] IN BLOOD BY AUTOMATED COUNT: 14.3 % (ref 11.5–14.5)
FLUAV RNA RESP QL NAA+PROBE: NOT DETECTED
FLUBV RNA RESP QL NAA+PROBE: NOT DETECTED
GFR SERPL CREATININE-BSD FRML MDRD: > 60 ML/MIN/1.73M2
GLUCOSE SERPL-MCNC: 180 MG/DL (ref 70–108)
GLUCOSE UR QL STRIP.AUTO: NEGATIVE MG/DL
HCT VFR BLD AUTO: 38.3 % (ref 37–47)
HGB BLD-MCNC: 12.7 GM/DL (ref 12–16)
HGB UR QL STRIP.AUTO: NEGATIVE
IMM GRANULOCYTES # BLD AUTO: 0.06 THOU/MM3 (ref 0–0.07)
IMM GRANULOCYTES NFR BLD AUTO: 0.4 %
KETONES UR QL STRIP.AUTO: 15
LACTATE SERPL-SCNC: 2 MMOL/L (ref 0.5–2)
LYMPHOCYTES ABSOLUTE: 2.7 THOU/MM3 (ref 1–4.8)
LYMPHOCYTES NFR BLD AUTO: 18.3 %
MCH RBC QN AUTO: 27.7 PG (ref 26–33)
MCHC RBC AUTO-ENTMCNC: 33.2 GM/DL (ref 32.2–35.5)
MCV RBC AUTO: 83.4 FL (ref 81–99)
MISCELLANEOUS 2: ABNORMAL
MONOCYTES ABSOLUTE: 1.1 THOU/MM3 (ref 0.4–1.3)
MONOCYTES NFR BLD AUTO: 7.9 %
NEUTROPHILS NFR BLD AUTO: 73 %
NITRITE UR QL STRIP: NEGATIVE
NRBC BLD AUTO-RTO: 0 /100 WBC
OSMOLALITY SERPL CALC.SUM OF ELEC: 277.3 MOSMOL/KG (ref 275–300)
PH UR STRIP.AUTO: 5.5 [PH] (ref 5–9)
PLATELET # BLD AUTO: 251 THOU/MM3 (ref 130–400)
PMV BLD AUTO: 9.4 FL (ref 9.4–12.4)
POTASSIUM SERPL-SCNC: 3.7 MEQ/L (ref 3.5–5.2)
PROCALCITONIN SERPL IA-MCNC: 0.3 NG/ML (ref 0.01–0.09)
PROT UR STRIP.AUTO-MCNC: 30 MG/DL
RBC # BLD AUTO: 4.59 MILL/MM3 (ref 4.2–5.4)
RBC URINE: ABNORMAL /HPF
RENAL EPI CELLS #/AREA URNS HPF: ABNORMAL /[HPF]
SARS-COV-2 RNA RESP QL NAA+PROBE: DETECTED
SEGMENTED NEUTROPHILS ABSOLUTE COUNT: 10.6 THOU/MM3 (ref 1.8–7.7)
SODIUM SERPL-SCNC: 136 MEQ/L (ref 135–145)
SP GR UR REFRACT.AUTO: 1.02 (ref 1–1.03)
TROPONIN T: < 0.01 NG/ML
UROBILINOGEN, URINE: 0.2 EU/DL (ref 0–1)
WBC # BLD AUTO: 14.5 THOU/MM3 (ref 4.8–10.8)
WBC #/AREA URNS HPF: ABNORMAL /HPF
WBC #/AREA URNS HPF: ABNORMAL /[HPF]
YEAST LIKE FUNGI URNS QL MICRO: ABNORMAL

## 2023-02-23 PROCEDURE — 81001 URINALYSIS AUTO W/SCOPE: CPT

## 2023-02-23 PROCEDURE — 96374 THER/PROPH/DIAG INJ IV PUSH: CPT

## 2023-02-23 PROCEDURE — 83605 ASSAY OF LACTIC ACID: CPT

## 2023-02-23 PROCEDURE — 72125 CT NECK SPINE W/O DYE: CPT

## 2023-02-23 PROCEDURE — 93005 ELECTROCARDIOGRAM TRACING: CPT

## 2023-02-23 PROCEDURE — 84484 ASSAY OF TROPONIN QUANT: CPT

## 2023-02-23 PROCEDURE — G0378 HOSPITAL OBSERVATION PER HR: HCPCS

## 2023-02-23 PROCEDURE — 36415 COLL VENOUS BLD VENIPUNCTURE: CPT

## 2023-02-23 PROCEDURE — 93010 ELECTROCARDIOGRAM REPORT: CPT | Performed by: NUCLEAR MEDICINE

## 2023-02-23 PROCEDURE — 6360000002 HC RX W HCPCS

## 2023-02-23 PROCEDURE — 96375 TX/PRO/DX INJ NEW DRUG ADDON: CPT

## 2023-02-23 PROCEDURE — 96365 THER/PROPH/DIAG IV INF INIT: CPT

## 2023-02-23 PROCEDURE — 70450 CT HEAD/BRAIN W/O DYE: CPT

## 2023-02-23 PROCEDURE — 99285 EMERGENCY DEPT VISIT HI MDM: CPT

## 2023-02-23 PROCEDURE — 84145 PROCALCITONIN (PCT): CPT

## 2023-02-23 PROCEDURE — 80048 BASIC METABOLIC PNL TOTAL CA: CPT

## 2023-02-23 PROCEDURE — 87086 URINE CULTURE/COLONY COUNT: CPT

## 2023-02-23 PROCEDURE — 96361 HYDRATE IV INFUSION ADD-ON: CPT

## 2023-02-23 PROCEDURE — 85025 COMPLETE CBC W/AUTO DIFF WBC: CPT

## 2023-02-23 PROCEDURE — 82330 ASSAY OF CALCIUM: CPT

## 2023-02-23 PROCEDURE — 2580000003 HC RX 258

## 2023-02-23 PROCEDURE — 96376 TX/PRO/DX INJ SAME DRUG ADON: CPT

## 2023-02-23 PROCEDURE — 87040 BLOOD CULTURE FOR BACTERIA: CPT

## 2023-02-23 PROCEDURE — 86140 C-REACTIVE PROTEIN: CPT

## 2023-02-23 PROCEDURE — 87077 CULTURE AEROBIC IDENTIFY: CPT

## 2023-02-23 PROCEDURE — 71046 X-RAY EXAM CHEST 2 VIEWS: CPT

## 2023-02-23 PROCEDURE — 6370000000 HC RX 637 (ALT 250 FOR IP)

## 2023-02-23 PROCEDURE — 87636 SARSCOV2 & INF A&B AMP PRB: CPT

## 2023-02-23 RX ORDER — GABAPENTIN 600 MG/1
600 TABLET ORAL 4 TIMES DAILY
Status: DISCONTINUED | OUTPATIENT
Start: 2023-02-24 | End: 2023-02-27 | Stop reason: HOSPADM

## 2023-02-23 RX ORDER — ACETAMINOPHEN 500 MG
1000 TABLET ORAL ONCE
Status: COMPLETED | OUTPATIENT
Start: 2023-02-23 | End: 2023-02-23

## 2023-02-23 RX ORDER — SODIUM CHLORIDE 0.9 % (FLUSH) 0.9 %
5-40 SYRINGE (ML) INJECTION EVERY 12 HOURS SCHEDULED
Status: DISCONTINUED | OUTPATIENT
Start: 2023-02-24 | End: 2023-02-27 | Stop reason: HOSPADM

## 2023-02-23 RX ORDER — SODIUM CHLORIDE 0.9 % (FLUSH) 0.9 %
10 SYRINGE (ML) INJECTION PRN
Status: DISCONTINUED | OUTPATIENT
Start: 2023-02-23 | End: 2023-02-27 | Stop reason: HOSPADM

## 2023-02-23 RX ORDER — INSULIN LISPRO 100 [IU]/ML
0-4 INJECTION, SOLUTION INTRAVENOUS; SUBCUTANEOUS NIGHTLY
Status: DISCONTINUED | OUTPATIENT
Start: 2023-02-24 | End: 2023-02-27 | Stop reason: HOSPADM

## 2023-02-23 RX ORDER — SODIUM CHLORIDE 9 MG/ML
INJECTION, SOLUTION INTRAVENOUS CONTINUOUS
Status: ACTIVE | OUTPATIENT
Start: 2023-02-24 | End: 2023-02-24

## 2023-02-23 RX ORDER — POLYETHYLENE GLYCOL 3350 17 G/17G
17 POWDER, FOR SOLUTION ORAL DAILY PRN
Status: DISCONTINUED | OUTPATIENT
Start: 2023-02-23 | End: 2023-02-27 | Stop reason: HOSPADM

## 2023-02-23 RX ORDER — ONDANSETRON 4 MG/1
4 TABLET, ORALLY DISINTEGRATING ORAL EVERY 8 HOURS PRN
Status: DISCONTINUED | OUTPATIENT
Start: 2023-02-23 | End: 2023-02-27 | Stop reason: HOSPADM

## 2023-02-23 RX ORDER — MAGNESIUM OXIDE 400 MG/1
400 TABLET ORAL 2 TIMES DAILY
COMMUNITY
Start: 2018-08-01

## 2023-02-23 RX ORDER — ONDANSETRON 2 MG/ML
4 INJECTION INTRAMUSCULAR; INTRAVENOUS EVERY 6 HOURS PRN
Status: DISCONTINUED | OUTPATIENT
Start: 2023-02-23 | End: 2023-02-27 | Stop reason: HOSPADM

## 2023-02-23 RX ORDER — LEVOTHYROXINE SODIUM 0.05 MG/1
50 TABLET ORAL DAILY
Status: DISCONTINUED | OUTPATIENT
Start: 2023-02-24 | End: 2023-02-27 | Stop reason: HOSPADM

## 2023-02-23 RX ORDER — PANTOPRAZOLE SODIUM 40 MG/1
40 TABLET, DELAYED RELEASE ORAL
Status: DISCONTINUED | OUTPATIENT
Start: 2023-02-24 | End: 2023-02-27 | Stop reason: HOSPADM

## 2023-02-23 RX ORDER — METOPROLOL SUCCINATE 50 MG/1
50 TABLET, EXTENDED RELEASE ORAL 2 TIMES DAILY
Status: DISCONTINUED | OUTPATIENT
Start: 2023-02-24 | End: 2023-02-26

## 2023-02-23 RX ORDER — SODIUM CHLORIDE 9 MG/ML
INJECTION, SOLUTION INTRAVENOUS PRN
Status: DISCONTINUED | OUTPATIENT
Start: 2023-02-23 | End: 2023-02-27 | Stop reason: HOSPADM

## 2023-02-23 RX ORDER — INSULIN LISPRO 100 [IU]/ML
0-8 INJECTION, SOLUTION INTRAVENOUS; SUBCUTANEOUS
Status: DISCONTINUED | OUTPATIENT
Start: 2023-02-24 | End: 2023-02-27 | Stop reason: HOSPADM

## 2023-02-23 RX ORDER — METOCLOPRAMIDE HYDROCHLORIDE 5 MG/ML
10 INJECTION INTRAMUSCULAR; INTRAVENOUS ONCE
Status: COMPLETED | OUTPATIENT
Start: 2023-02-23 | End: 2023-02-23

## 2023-02-23 RX ORDER — 0.9 % SODIUM CHLORIDE 0.9 %
1000 INTRAVENOUS SOLUTION INTRAVENOUS ONCE
Status: COMPLETED | OUTPATIENT
Start: 2023-02-23 | End: 2023-02-23

## 2023-02-23 RX ORDER — DIPHENHYDRAMINE HYDROCHLORIDE 50 MG/ML
25 INJECTION INTRAMUSCULAR; INTRAVENOUS ONCE
Status: COMPLETED | OUTPATIENT
Start: 2023-02-23 | End: 2023-02-23

## 2023-02-23 RX ORDER — FLECAINIDE ACETATE 50 MG/1
50 TABLET ORAL 2 TIMES DAILY
Status: DISCONTINUED | OUTPATIENT
Start: 2023-02-24 | End: 2023-02-27 | Stop reason: HOSPADM

## 2023-02-23 RX ORDER — KETOROLAC TROMETHAMINE 30 MG/ML
15 INJECTION, SOLUTION INTRAMUSCULAR; INTRAVENOUS ONCE
Status: COMPLETED | OUTPATIENT
Start: 2023-02-23 | End: 2023-02-23

## 2023-02-23 RX ORDER — ACETAMINOPHEN 650 MG/1
650 SUPPOSITORY RECTAL EVERY 6 HOURS PRN
Status: DISCONTINUED | OUTPATIENT
Start: 2023-02-23 | End: 2023-02-25

## 2023-02-23 RX ORDER — CLONAZEPAM 1 MG/1
1 TABLET ORAL 3 TIMES DAILY PRN
Status: DISCONTINUED | OUTPATIENT
Start: 2023-02-23 | End: 2023-02-27 | Stop reason: HOSPADM

## 2023-02-23 RX ORDER — DEXTROSE MONOHYDRATE 100 MG/ML
INJECTION, SOLUTION INTRAVENOUS CONTINUOUS PRN
Status: DISCONTINUED | OUTPATIENT
Start: 2023-02-23 | End: 2023-02-27 | Stop reason: HOSPADM

## 2023-02-23 RX ORDER — ACETAMINOPHEN 325 MG/1
650 TABLET ORAL EVERY 6 HOURS PRN
Status: DISCONTINUED | OUTPATIENT
Start: 2023-02-23 | End: 2023-02-25

## 2023-02-23 RX ADMIN — KETOROLAC TROMETHAMINE 15 MG: 30 INJECTION, SOLUTION INTRAMUSCULAR; INTRAVENOUS at 17:30

## 2023-02-23 RX ADMIN — DIPHENHYDRAMINE HYDROCHLORIDE 25 MG: 50 INJECTION, SOLUTION INTRAMUSCULAR; INTRAVENOUS at 19:48

## 2023-02-23 RX ADMIN — CEFTRIAXONE 2000 MG: 2 INJECTION, POWDER, FOR SOLUTION INTRAMUSCULAR; INTRAVENOUS at 22:43

## 2023-02-23 RX ADMIN — ACETAMINOPHEN 1000 MG: 500 TABLET ORAL at 17:31

## 2023-02-23 RX ADMIN — SODIUM CHLORIDE 1000 ML: 9 INJECTION, SOLUTION INTRAVENOUS at 17:30

## 2023-02-23 RX ADMIN — METOCLOPRAMIDE 10 MG: 5 INJECTION, SOLUTION INTRAMUSCULAR; INTRAVENOUS at 19:48

## 2023-02-23 ASSESSMENT — PAIN DESCRIPTION - LOCATION
LOCATION: HEAD
LOCATION: HAND

## 2023-02-23 ASSESSMENT — PAIN SCALES - GENERAL
PAINLEVEL_OUTOF10: 7
PAINLEVEL_OUTOF10: 4
PAINLEVEL_OUTOF10: 4
PAINLEVEL_OUTOF10: 6
PAINLEVEL_OUTOF10: 6

## 2023-02-23 ASSESSMENT — PAIN - FUNCTIONAL ASSESSMENT
PAIN_FUNCTIONAL_ASSESSMENT: 0-10
PAIN_FUNCTIONAL_ASSESSMENT: NONE - DENIES PAIN

## 2023-02-23 ASSESSMENT — PAIN DESCRIPTION - DESCRIPTORS: DESCRIPTORS: ACHING
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
## 2023-02-23 NOTE — ED NOTES
Pt medicated per MAR at this time. She states she wants to try the tylenol and toradol.      Skylar Kenny RN  02/23/23 9649

## 2023-02-23 NOTE — ED TRIAGE NOTES
Pt to ER from pcp due to tachycardia and testing positive for covid. Family reports pt has been disoriented for the past two days. Upon arrival pt is alert and oriented.  EKG completed upon arrival.

## 2023-02-23 NOTE — ED NOTES
RN assisted pt to the restroom at this time to obtain urine specimen.       Ramsey Sauced[facility]  02/23/23 8611
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
## 2023-02-24 LAB
ANION GAP SERPL CALC-SCNC: 13 MEQ/L (ref 8–16)
BUN SERPL-MCNC: 15 MG/DL (ref 7–22)
CA-I BLD ISE-SCNC: 0.96 MMOL/L (ref 1.12–1.32)
CA-I BLD ISE-SCNC: 0.99 MMOL/L (ref 1.12–1.32)
CA-I BLD ISE-SCNC: 1.2 MMOL/L (ref 1.12–1.32)
CALCIUM SERPL-MCNC: 8 MG/DL (ref 8.5–10.5)
CHLORIDE SERPL-SCNC: 106 MEQ/L (ref 98–111)
CK SERPL-CCNC: 1520 U/L (ref 30–135)
CO2 SERPL-SCNC: 20 MEQ/L (ref 23–33)
CREAT SERPL-MCNC: 0.9 MG/DL (ref 0.4–1.2)
DEPRECATED RDW RBC AUTO: 49.8 FL (ref 35–45)
ERYTHROCYTE [DISTWIDTH] IN BLOOD BY AUTOMATED COUNT: 14.7 % (ref 11.5–14.5)
GFR SERPL CREATININE-BSD FRML MDRD: > 60 ML/MIN/1.73M2
GLUCOSE BLD STRIP.AUTO-MCNC: 127 MG/DL (ref 70–108)
GLUCOSE BLD STRIP.AUTO-MCNC: 137 MG/DL (ref 70–108)
GLUCOSE BLD STRIP.AUTO-MCNC: 168 MG/DL (ref 70–108)
GLUCOSE BLD STRIP.AUTO-MCNC: 177 MG/DL (ref 70–108)
GLUCOSE BLD STRIP.AUTO-MCNC: 200 MG/DL (ref 70–108)
GLUCOSE SERPL-MCNC: 148 MG/DL (ref 70–108)
HCT VFR BLD AUTO: 41 % (ref 37–47)
HGB BLD-MCNC: 12.5 GM/DL (ref 12–16)
MAGNESIUM SERPL-MCNC: 1.4 MG/DL (ref 1.6–2.4)
MAGNESIUM SERPL-MCNC: 1.9 MG/DL (ref 1.6–2.4)
MCH RBC QN AUTO: 28.2 PG (ref 26–33)
MCHC RBC AUTO-ENTMCNC: 30.5 GM/DL (ref 32.2–35.5)
MCV RBC AUTO: 92.3 FL (ref 81–99)
MRSA DNA SPEC QL NAA+PROBE: NEGATIVE
PHOSPHATE SERPL-MCNC: 3.2 MG/DL (ref 2.4–4.7)
PLATELET # BLD AUTO: 181 THOU/MM3 (ref 130–400)
PMV BLD AUTO: 9 FL (ref 9.4–12.4)
POTASSIUM SERPL-SCNC: 4.3 MEQ/L (ref 3.5–5.2)
RBC # BLD AUTO: 4.44 MILL/MM3 (ref 4.2–5.4)
SODIUM SERPL-SCNC: 139 MEQ/L (ref 135–145)
WBC # BLD AUTO: 13.6 THOU/MM3 (ref 4.8–10.8)

## 2023-02-24 PROCEDURE — 87641 MR-STAPH DNA AMP PROBE: CPT

## 2023-02-24 PROCEDURE — 96361 HYDRATE IV INFUSION ADD-ON: CPT

## 2023-02-24 PROCEDURE — 99233 SBSQ HOSP IP/OBS HIGH 50: CPT | Performed by: HOSPITALIST

## 2023-02-24 PROCEDURE — 2580000003 HC RX 258: Performed by: STUDENT IN AN ORGANIZED HEALTH CARE EDUCATION/TRAINING PROGRAM

## 2023-02-24 PROCEDURE — 83735 ASSAY OF MAGNESIUM: CPT

## 2023-02-24 PROCEDURE — 96367 TX/PROPH/DG ADDL SEQ IV INF: CPT

## 2023-02-24 PROCEDURE — 6360000002 HC RX W HCPCS: Performed by: STUDENT IN AN ORGANIZED HEALTH CARE EDUCATION/TRAINING PROGRAM

## 2023-02-24 PROCEDURE — 2500000003 HC RX 250 WO HCPCS: Performed by: STUDENT IN AN ORGANIZED HEALTH CARE EDUCATION/TRAINING PROGRAM

## 2023-02-24 PROCEDURE — 96365 THER/PROPH/DIAG IV INF INIT: CPT

## 2023-02-24 PROCEDURE — 82948 REAGENT STRIP/BLOOD GLUCOSE: CPT

## 2023-02-24 PROCEDURE — 85027 COMPLETE CBC AUTOMATED: CPT

## 2023-02-24 PROCEDURE — 36415 COLL VENOUS BLD VENIPUNCTURE: CPT

## 2023-02-24 PROCEDURE — 96366 THER/PROPH/DIAG IV INF ADDON: CPT

## 2023-02-24 PROCEDURE — 6370000000 HC RX 637 (ALT 250 FOR IP): Performed by: STUDENT IN AN ORGANIZED HEALTH CARE EDUCATION/TRAINING PROGRAM

## 2023-02-24 PROCEDURE — 80048 BASIC METABOLIC PNL TOTAL CA: CPT

## 2023-02-24 PROCEDURE — 82550 ASSAY OF CK (CPK): CPT

## 2023-02-24 PROCEDURE — 84100 ASSAY OF PHOSPHORUS: CPT

## 2023-02-24 PROCEDURE — 96375 TX/PRO/DX INJ NEW DRUG ADDON: CPT

## 2023-02-24 PROCEDURE — 93005 ELECTROCARDIOGRAM TRACING: CPT | Performed by: STUDENT IN AN ORGANIZED HEALTH CARE EDUCATION/TRAINING PROGRAM

## 2023-02-24 PROCEDURE — 82330 ASSAY OF CALCIUM: CPT

## 2023-02-24 PROCEDURE — 93010 ELECTROCARDIOGRAM REPORT: CPT | Performed by: NUCLEAR MEDICINE

## 2023-02-24 PROCEDURE — G0378 HOSPITAL OBSERVATION PER HR: HCPCS

## 2023-02-24 RX ORDER — DIVALPROEX SODIUM 250 MG/1
250 TABLET, EXTENDED RELEASE ORAL DAILY
Status: DISCONTINUED | OUTPATIENT
Start: 2023-02-24 | End: 2023-02-27 | Stop reason: HOSPADM

## 2023-02-24 RX ORDER — 0.9 % SODIUM CHLORIDE 0.9 %
500 INTRAVENOUS SOLUTION INTRAVENOUS ONCE
Status: COMPLETED | OUTPATIENT
Start: 2023-02-24 | End: 2023-02-24

## 2023-02-24 RX ORDER — METOPROLOL TARTRATE 5 MG/5ML
2.5 INJECTION INTRAVENOUS ONCE
Status: DISCONTINUED | OUTPATIENT
Start: 2023-02-24 | End: 2023-02-24

## 2023-02-24 RX ORDER — SODIUM CHLORIDE 9 MG/ML
INJECTION, SOLUTION INTRAVENOUS CONTINUOUS
Status: CANCELLED | OUTPATIENT
Start: 2023-02-24

## 2023-02-24 RX ORDER — SODIUM CHLORIDE 9 MG/ML
INJECTION, SOLUTION INTRAVENOUS CONTINUOUS
Status: ACTIVE | OUTPATIENT
Start: 2023-02-24 | End: 2023-02-25

## 2023-02-24 RX ORDER — CLONAZEPAM 2 MG/1
4 TABLET ORAL NIGHTLY
COMMUNITY

## 2023-02-24 RX ORDER — CLONAZEPAM 2 MG/1
2 TABLET ORAL 2 TIMES DAILY
COMMUNITY

## 2023-02-24 RX ORDER — MECLIZINE HCL 25MG 25 MG/1
25 TABLET, CHEWABLE ORAL 3 TIMES DAILY PRN
COMMUNITY

## 2023-02-24 RX ORDER — METOPROLOL TARTRATE 5 MG/5ML
5 INJECTION INTRAVENOUS ONCE
Status: COMPLETED | OUTPATIENT
Start: 2023-02-24 | End: 2023-02-24

## 2023-02-24 RX ORDER — MECLIZINE HCL 25MG 25 MG/1
25 TABLET, CHEWABLE ORAL 3 TIMES DAILY PRN
Status: DISCONTINUED | OUTPATIENT
Start: 2023-02-24 | End: 2023-02-27 | Stop reason: HOSPADM

## 2023-02-24 RX ORDER — LISINOPRIL 20 MG/1
20 TABLET ORAL DAILY
Status: ON HOLD | COMMUNITY
End: 2023-02-27 | Stop reason: SDUPTHER

## 2023-02-24 RX ORDER — DILTIAZEM HYDROCHLORIDE 5 MG/ML
10 INJECTION INTRAVENOUS ONCE
Status: COMPLETED | OUTPATIENT
Start: 2023-02-24 | End: 2023-02-24

## 2023-02-24 RX ORDER — NORTRIPTYLINE HYDROCHLORIDE 10 MG/1
10 CAPSULE ORAL NIGHTLY
Status: DISCONTINUED | OUTPATIENT
Start: 2023-02-24 | End: 2023-02-27 | Stop reason: HOSPADM

## 2023-02-24 RX ORDER — GLIMEPIRIDE 2 MG/1
2 TABLET ORAL
COMMUNITY

## 2023-02-24 RX ORDER — 0.9 % SODIUM CHLORIDE 0.9 %
1000 INTRAVENOUS SOLUTION INTRAVENOUS ONCE
Status: COMPLETED | OUTPATIENT
Start: 2023-02-24 | End: 2023-02-24

## 2023-02-24 RX ORDER — DIVALPROEX SODIUM 250 MG/1
250 TABLET, EXTENDED RELEASE ORAL DAILY
COMMUNITY

## 2023-02-24 RX ORDER — NORTRIPTYLINE HYDROCHLORIDE 10 MG/1
10 CAPSULE ORAL NIGHTLY
COMMUNITY

## 2023-02-24 RX ORDER — MAGNESIUM SULFATE HEPTAHYDRATE 40 MG/ML
2000 INJECTION, SOLUTION INTRAVENOUS ONCE
Status: COMPLETED | OUTPATIENT
Start: 2023-02-24 | End: 2023-02-24

## 2023-02-24 RX ADMIN — LEVOTHYROXINE SODIUM 50 MCG: 0.05 TABLET ORAL at 06:10

## 2023-02-24 RX ADMIN — DILTIAZEM HYDROCHLORIDE 5 MG/HR: 5 INJECTION, SOLUTION INTRAVENOUS at 18:39

## 2023-02-24 RX ADMIN — ACETAMINOPHEN 650 MG: 325 TABLET ORAL at 10:03

## 2023-02-24 RX ADMIN — SODIUM CHLORIDE: 9 INJECTION, SOLUTION INTRAVENOUS at 12:31

## 2023-02-24 RX ADMIN — CEFTRIAXONE SODIUM 1000 MG: 1 INJECTION, POWDER, FOR SOLUTION INTRAMUSCULAR; INTRAVENOUS at 06:13

## 2023-02-24 RX ADMIN — SODIUM CHLORIDE 500 ML: 9 INJECTION, SOLUTION INTRAVENOUS at 13:45

## 2023-02-24 RX ADMIN — METOPROLOL SUCCINATE 50 MG: 50 TABLET, EXTENDED RELEASE ORAL at 10:03

## 2023-02-24 RX ADMIN — DIVALPROEX SODIUM 250 MG: 250 TABLET, FILM COATED, EXTENDED RELEASE ORAL at 10:03

## 2023-02-24 RX ADMIN — METOPROLOL TARTRATE 5 MG: 5 INJECTION, SOLUTION INTRAVENOUS at 16:08

## 2023-02-24 RX ADMIN — PANTOPRAZOLE SODIUM 40 MG: 40 TABLET, DELAYED RELEASE ORAL at 06:10

## 2023-02-24 RX ADMIN — SODIUM CHLORIDE: 9 INJECTION, SOLUTION INTRAVENOUS at 00:21

## 2023-02-24 RX ADMIN — NORTRIPTYLINE HYDROCHLORIDE 10 MG: 10 CAPSULE ORAL at 01:51

## 2023-02-24 RX ADMIN — CLONAZEPAM 1 MG: 1 TABLET ORAL at 12:31

## 2023-02-24 RX ADMIN — SODIUM CHLORIDE, PRESERVATIVE FREE 10 ML: 5 INJECTION INTRAVENOUS at 21:14

## 2023-02-24 RX ADMIN — METOPROLOL SUCCINATE 50 MG: 50 TABLET, EXTENDED RELEASE ORAL at 21:12

## 2023-02-24 RX ADMIN — NORTRIPTYLINE HYDROCHLORIDE 10 MG: 10 CAPSULE ORAL at 21:12

## 2023-02-24 RX ADMIN — SODIUM CHLORIDE: 9 INJECTION, SOLUTION INTRAVENOUS at 16:07

## 2023-02-24 RX ADMIN — RIVAROXABAN 20 MG: 20 TABLET, FILM COATED ORAL at 10:03

## 2023-02-24 RX ADMIN — FLECAINIDE ACETATE 50 MG: 50 TABLET ORAL at 21:12

## 2023-02-24 RX ADMIN — ACETAMINOPHEN 650 MG: 325 TABLET ORAL at 21:12

## 2023-02-24 RX ADMIN — SODIUM CHLORIDE: 9 INJECTION, SOLUTION INTRAVENOUS at 10:20

## 2023-02-24 RX ADMIN — CALCIUM GLUCONATE 4000 MG: 98 INJECTION, SOLUTION INTRAVENOUS at 15:16

## 2023-02-24 RX ADMIN — FLECAINIDE ACETATE 50 MG: 50 TABLET ORAL at 01:51

## 2023-02-24 RX ADMIN — MAGNESIUM SULFATE HEPTAHYDRATE 2000 MG: 40 INJECTION, SOLUTION INTRAVENOUS at 10:25

## 2023-02-24 RX ADMIN — CLONAZEPAM 1 MG: 1 TABLET ORAL at 06:10

## 2023-02-24 RX ADMIN — FLECAINIDE ACETATE 50 MG: 50 TABLET ORAL at 10:03

## 2023-02-24 RX ADMIN — DILTIAZEM HYDROCHLORIDE 10 MG: 5 INJECTION INTRAVENOUS at 18:32

## 2023-02-24 RX ADMIN — SODIUM CHLORIDE 1000 ML: 9 INJECTION, SOLUTION INTRAVENOUS at 01:09

## 2023-02-24 ASSESSMENT — PAIN SCALES - GENERAL: PAINLEVEL_OUTOF10: 0

## 2023-02-24 NOTE — PROGRESS NOTES
1130 am:  This nurse sent a message to Meek Douglas MD due to Heart Rate is consistently staying in the 130's and bounces occasionally into the 140's. Oral temp has come down to 99.4    1157 am: This nurse has received any new orders at this time. Another message was sent to Meek Douglas MD for HR into the 130's to 145 bpm.     1208 am:   No new orders obtained. This nurse sent another message to Meek Douglas MD that HR is staying into 130's. 1214  pm:  Order obtained for an EKG    1323:  EKG Completed and handed to Meke Douglas MD   EKG Showed A-fib with RVR. 1345  B/P 92/54 and re-check 96/52. Orders for Metoprolol IV for HR elevated. Dr. Meek Douglas MD is aware and stated to hold that and give 500 ml normal saline bolus over 2  hours.     1345:   Normal Saline 500 ml IV Bolus started Immediately    1430:   B/P 98/58,     1500:   B/P 96/57,     Will continue to monitor

## 2023-02-24 NOTE — ED NOTES
PT resting in bed. Respirations even and unlabored. Call light in reach. P T on the phone with family. PT and VS assessed.       Scarlet Steel RN  02/23/23 2122

## 2023-02-24 NOTE — ED NOTES
Medications started per [facility]. PT and VS assessed. Respirations even and unlabored. Call light in reach.      Luc Levine RN  02/23/23 7913

## 2023-02-24 NOTE — ED NOTES
PT resting in bed. Respirations even and unlabored. Pt states headache is 6/10. RN notified Dr Gurdeep Flores of PT blood pressure. PT and VS assessed.       Lesa Hardy RN  02/23/23 1947

## 2023-02-24 NOTE — PLAN OF CARE
Problem: Discharge Planning  Goal: Discharge to home or other facility with appropriate resources  Outcome: Progressing  Flowsheets (Taken 2/23/2023 2344)  Discharge to home or other facility with appropriate resources:   Identify barriers to discharge with patient and caregiver   Arrange for needed discharge resources and transportation as appropriate   Identify discharge learning needs (meds, wound care, etc)   Refer to discharge planning if patient needs post-hospital services based on physician order or complex needs related to functional status, cognitive ability or social support system     Problem: Safety - Adult  Goal: Free from fall injury  Outcome: Progressing  Flowsheets (Taken 2/24/2023 0514)  Free From Fall Injury: Instruct family/caregiver on patient safety     Problem: ABCDS Injury Assessment  Goal: Absence of physical injury  Outcome: Progressing  Flowsheets (Taken 2/24/2023 0514)  Absence of Physical Injury: Implement safety measures based on patient assessment     Problem: Respiratory - Adult  Goal: Achieves optimal ventilation and oxygenation  Outcome: Progressing  Flowsheets (Taken 2/23/2023 2330)  Achieves optimal ventilation and oxygenation:   Assess for changes in respiratory status   Assess for changes in mentation and behavior   Position to facilitate oxygenation and minimize respiratory effort   Oxygen supplementation based on oxygen saturation or arterial blood gases   Encourage broncho-pulmonary hygiene including cough, deep breathe, incentive spirometry   Assess and instruct to report shortness of breath or any respiratory difficulty     Problem: Cardiovascular - Adult  Goal: Maintains optimal cardiac output and hemodynamic stability  Outcome: Progressing  Flowsheets (Taken 2/23/2023 2330)  Maintains optimal cardiac output and hemodynamic stability:   Monitor blood pressure and heart rate   Monitor urine output and notify Licensed Independent Practitioner for values outside of normal range   Assess for signs of decreased cardiac output  Goal: Absence of cardiac dysrhythmias or at baseline  Outcome: Progressing  Flowsheets (Taken 2/23/2023 2330)  Absence of cardiac dysrhythmias or at baseline:   Monitor cardiac rate and rhythm   Assess for signs of decreased cardiac output   Administer antiarrhythmia medication and electrolyte replacement as ordered     Problem: Skin/Tissue Integrity - Adult  Goal: Skin integrity remains intact  Outcome: Progressing  Flowsheets (Taken 2/23/2023 2330)  Skin Integrity Remains Intact:   Monitor for areas of redness and/or skin breakdown   Assess vascular access sites hourly     Problem: Musculoskeletal - Adult  Goal: Return mobility to safest level of function  Outcome: Progressing  Flowsheets (Taken 2/23/2023 2330)  Return Mobility to Safest Level of Function:   Assess patient stability and activity tolerance for standing, transferring and ambulating with or without assistive devices   Assist with transfers and ambulation using safe patient handling equipment as needed   Ensure adequate protection for wounds/incisions during mobilization   Obtain physical therapy/occupational therapy consults as needed     Problem: Gastrointestinal - Adult  Goal: Maintains adequate nutritional intake  Outcome: Progressing  Flowsheets (Taken 2/23/2023 2330)  Maintains adequate nutritional intake:   Monitor percentage of each meal consumed   Identify factors contributing to decreased intake, treat as appropriate   Monitor intake and output, weight and lab values     Problem: Infection - Adult  Goal: Absence of infection at discharge  Outcome: Progressing  Flowsheets (Taken 2/23/2023 2330)  Absence of infection at discharge:   Assess and monitor for signs and symptoms of infection   Monitor lab/diagnostic results   Monitor all insertion sites i.e., indwelling lines, tubes and drains   Administer medications as ordered   Instruct and encourage patient and family to use good hand hygiene technique  Goal: Absence of infection during hospitalization  Outcome: Progressing  Flowsheets (Taken 2/23/2023 1220)  Absence of infection during hospitalization:   Assess and monitor for signs and symptoms of infection   Monitor lab/diagnostic results   Monitor all insertion sites i.e., indwelling lines, tubes and drains   Administer medications as ordered   Instruct and encourage patient and family to use good hand hygiene technique   Care plan reviewed with patient. Patient verbalized understanding of the plan of care and contribute to goal setting.

## 2023-02-24 NOTE — CARE COORDINATION
Case Management Assessment  Initial Evaluation    Date/Time of Evaluation: 2/24/2023 2:29 PM  Assessment Completed by: Rah Perez RN    If patient is discharged prior to next notation, then this note serves as note for discharge by case management. Patient Name: Catherine Meehan                   YOB: 1950  Diagnosis: Acute cystitis without hematuria [N30.00]  COVID [U07.1]  COVID-19 [U07.1]                   Date / Time: 2/23/2023  4:18 PM  Location: [address] [place] [address]     Patient Admission Status: Observation   Readmission Risk Low 0-14, Mod 15-19), High > 20: Readmission Risk Score: 6.7    Current PCP: Phyllis Brantley  PCP verified by CM? Yes    Chart Reviewed: Yes      History Provided by: Patient  Patient Orientation: Alert and Oriented    Patient Cognition: Alert    Hospitalization in the last 30 days (Readmission):  No    If yes, Readmission Assessment in CM Navigator will be completed. Advance Directives:      Code Status: Full Code   Patient's Primary Decision Maker is: Patient Declined (Legal Next of Kin Remains as Decision Maker)      Discharge Planning:    Patient lives with: Alone Type of Home: House  Primary Care Giver: Self  Patient Support Systems include: Children   Current Financial resources: Medicare  Current community resources: None  Current services prior to admission: Durable Medical Equipment            Current DME: Darlene Eduardo            Type of Home Care services:  None    ADLS  Prior functional level: Independent in ADLs/IADLs  Current functional level: Independent in ADLs/IADLs    Family can provide assistance at DC: Would you like Case Management to discuss the discharge plan with any other family members/significant others, and if so, who?  No  Plans to Return to Present Housing: Yes  Other Identified Issues/Barriers to RETURNING to current housing: None  Potential Assistance needed at discharge: Meals On Wheels            Potential DME:    Patient expects to discharge to: [address] [place] [address] for transportation at discharge: Family    Financial    Payor: Ellie Rodriguez6 / Plan: [facility] [place] [facility] / Product Type: *No Product type* /     Does insurance require precert for SNF: Yes    Potential assistance Purchasing Medications: No  Meds-to-Beds request: Yes      [facility] - [address], [address]  [address] [address]  Phone: [phone] Fax: [phone]    [address] [place] [address] [facility] [address] [phone] - F [phone]  [address]  [address]  Phone: [phone] Fax: [phone]    [facility] [address] TAVIA MUÑOZ [address], [address] [facility] [phone] [facility] [phone]   [facility] [address]  Phone: [phone] Fax: [phone]      Notes:    Factors facilitating achievement of predicted outcomes: Family support, Cooperative, and Pleasant    Barriers to discharge: Medical complications and Medication management and home alone    Additional Case Management Notes: Pt admitted through ER with c/o COVID + at home, mild confusion,headache, tachycardia. On tele, IVF, IV Rocephin daily, Blood cultures pending, UA positive-urine culture pending, monitor for hypotension    Procedure: 2/23: CXR: Stable radiographic appearance of the chest. No evidence of an acute process  2/23: CT head: No evidence of acute intracranial abnormality  2/23: CT cspine: No evidence of acute osseous injury of the cervical spine    The Plan for Transition of Care is related to the following treatment goals of Acute cystitis without hematuria [N30.00]  COVID [U07.1]  COVID-19 [U07.1]    Patient Goals/Plan/Treatment Preferences: Spoke with pt. She lives home alone prior to admission. She states she had recently fallen in bathroom and was too weak to get up. When asked about goals for regaining strength and balance she does NOT want SNF. She is open to [place].  Pt thinks she may be able to see if a niece can stay with her. Family lives nearby. Has rollator and drives. SW consulted. Will continue to follow for home going needs. Transportation/Food Security/Housekeeping Addressed: No issues identified.      Margarette Paget, RN  Case Management Department

## 2023-02-24 NOTE — ED NOTES
PT resting in bed. Respirations even and unlabored. Call light in reach. PT states headache is decreasing.      Scarlet Scott RN  02/23/23 2049

## 2023-02-24 NOTE — PROGRESS NOTES
Hospitalist Progress Note    Patient:  Luz Elena Reyna      Unit/Bed:6K-26/026-A    YOB: 1950    MRN: [id number]       Acct: [de-identified]     PCP: Grace Suazo    Date of Admission: 2/23/2023    Assessment/Plan:    Sepsis  Supported by 4/4 SIRS criteria (febrile, elevated WBC, tachypnea, tachycardia) + source (COVID-19). Received IV fluid bolus. Received Rocephin x2 doses for suspected UTI (now ruled out). Blood Cx x2. C/w IV fluid maintenance. COVID-19  Positive PCR 02/23. CXR without evidence of acute process. No hypoxia. No indication for Decadron. Contact plus isolation. Symptomatic management. Atrial fibrillation with RVR  Hx of PAF. OP regimen: metoprolol and flecainide. [id number] 4. Xarelto resumed. Maintain telemetry. 02/24 --> Patient with A-fib RVR (130-140) despite resuming oral home meds. Suspect secondary to sepsis/COVID-19 infection. Diltiazem bolus + drip initiated. Hx of HTN  Metoprolol resumed with hold parameters    Hx of SSS  S/p PPM    Type 2 Diabetes Mellitus, complicated by peripheral neuropathy  Last hemoglobin A1c was 8.7 on 01/2023. Held home Janumet and glimepiride. Medium dose corrective algorithm initiated. Hypoglycemic protocol. POC glucose. Hx of Hypothyroidism  Synthroid resumed     Hx of HLD  Noted in history    Hx of GERD  PPI resumed    Hx of Anxiety   Nortriptyline resumed     Fibromyalgia   Noted    Expected discharge date:  TBD    Disposition:    [] Home       [] TCU       [] Rehab       [] Psych       [] SNF       [] [facility]       [x] Other-    Chief Complaint: Headache    Hospital Course:   77-year-old female with history of HTN, PAF, s/p PPM, DM2, hypothyroidism, HLD, anxiety and fibromyalgia who presented to [facility] for evaluation of a headache. The patient states her symptoms started 2 days ago. It is associated with fevers, diarrhea, confusion and fatigue/weakness.   She denies any nausea, vomiting, chest pain, shortness of breath, abdominal or urinary complaints. Patient reports yesterday evening while using the restroom she bent over to reach something and immediately lost consciousness, hitting her head. She woke up on the floor but was too weak to get up, laying there for 6 hours. She eventually found strength of her chart to PCP advised her to come in for evaluation. Upon arrival, vitals included Tmax 101.6, RR 20, , /54, SPO2 95% on RA. Labs sent for CL 96, CO2 22, AG 18, glucose 180, WBC 14.5. Troponin WNL. COVID-19 positive. CXR with no acute process. CT head and cervical spine with no acute abnormalities. Patient was given 1 L normal saline bolus. Hospitalist was contacted for admission. Please see A&P for further information. Subjective (past 24 hours):   Patient laying on the floor by bedside commode this morning. She denies hitting her head. She was alert and oriented, normal thought process, and answering questions appropriately. She denies chest pain, shortness of breath, palpitations, lightheadedness, dizziness, syncope or loss of consciousness. ROS (12 point review of systems completed. Pertinent positives noted. Otherwise ROS is negative).     Medications:  Reviewed    Infusion Medications    dextrose      sodium chloride 25 mL/hr at 02/24/23 1231     Scheduled Medications    divalproex  250 mg Oral Daily    nortriptyline  10 mg Oral Nightly    calcium replacement protocol   Other RX Placeholder    calcium gluconate  4,000 mg IntraVENous Once    metoprolol  2.5 mg IntraVENous Once    flecainide  50 mg Oral BID    levothyroxine  50 mcg Oral Daily    metoprolol succinate  50 mg Oral BID    [Held by provider] gabapentin  600 mg Oral 4x Daily    pantoprazole  40 mg Oral QAM AC    rivaroxaban  20 mg Oral Daily with breakfast    insulin lispro  0-8 Units SubCUTAneous TID WC    insulin lispro  0-4 Units SubCUTAneous Nightly    sodium chloride flush  5-40 mL IntraVENous 2 times per day     PRN Meds: meclizine, clonazePAM, glucose, dextrose bolus **OR** dextrose bolus, glucagon (rDNA), dextrose, sodium chloride flush, sodium chloride, ondansetron **OR** ondansetron, polyethylene glycol, acetaminophen **OR** acetaminophen      Intake/Output Summary (Last 24 hours) at 2/24/2023 1332  Last data filed at 2/24/2023 1001  Gross per 24 hour   Intake 1246.26 ml   Output 1050 ml   Net 196.26 ml       Diet:  ADULT DIET; Regular    Exam:  /66   Pulse (!) 131   Temp 99.4 °F (37.4 °C) (Oral)   Resp 18   Ht 5' 4\" (1.626 m)   Wt 143 lb (64.9 kg)   SpO2 92%    L/min   BMI 24.55 kg/m²     General appearance: No apparent distress, appears stated age and cooperative. Acutely ill-appearing. HEENT: Pupils equal, round, and reactive to light. Conjunctivae/corneas clear. Neck: Supple, with full range of motion. No jugular venous distention. Trachea midline. Respiratory:  Normal respiratory effort. Clear to auscultation, bilaterally without Rales/Wheezes/Rhonchi. Cardiovascular: Regular rate and rhythm with normal S1/S2 without murmurs, rubs or gallops. Abdomen: Soft, non-tender, non-distended with normal bowel sounds. Musculoskeletal: passive and active ROM x 4 extremities. Skin: Skin color, texture, turgor normal.  No rashes or lesions. Neurologic:  Neurovascularly intact without any focal sensory/motor deficits.  Cranial nerves: II-XII intact, grossly non-focal.  Psychiatric: Alert and oriented, thought content appropriate, normal insight  Capillary Refill: Brisk,< 3 seconds   Peripheral Pulses: +2 palpable, equal bilaterally       Labs:   Recent Labs     02/23/23  1740 02/24/23  0534   WBC 14.5* 13.6*   HGB 12.7 12.5   HCT 38.3 41.0    181     Recent Labs     02/23/23  1740 02/24/23  0534    139   K 3.7 4.3   CL 96* 106   CO2 22* 20*   BUN 15 15   CREATININE 0.9 0.9   CALCIUM 9.0 8.0*   PHOS  --  3.2     No results for input(s): AST, ALT, BILIDIR, BILITOT, ALKPHOS in the last 72 hours. No results for input(s): INR in the last 72 hours. Recent Labs     02/24/23  0534   CKTOTAL 1,520*       Microbiology:      Urinalysis:      Lab Results   Component Value Date/Time    NITRU NEGATIVE 02/23/2023 09:10 PM    WBCUA 50-75 02/23/2023 09:10 PM    BACTERIA NONE SEEN 02/23/2023 09:10 PM    RBCUA 3-5 02/23/2023 09:10 PM    BLOODU NEGATIVE 02/23/2023 09:10 PM    SPECGRAV 1.030 12/30/2020 11:28 AM    SPECGRAV >=1.030 11/04/2015 09:55 AM    GLUCOSEU NEGATIVE 02/23/2023 09:10 PM       Radiology:  CT HEAD WO CONTRAST   Final Result    No evidence of acute intracranial abnormality. **This report has been created using voice recognition software. It may contain minor errors which are inherent in voice recognition technology. **      Final report electronically signed by Dr. iVshnu Neff MD on 2/23/2023 6:56 PM      CT CERVICAL SPINE WO CONTRAST   Final Result    No evidence of acute osseous injury of the cervical spine. **This report has been created using voice recognition software. It may contain minor errors which are inherent in voice recognition technology. **      Final report electronically signed by Dr. Vishnu Neff MD on 2/23/2023 6:57 PM      XR CHEST (2 VW)   Final Result   Stable radiographic appearance of the chest. No evidence of an acute process. **This report has been created using voice recognition software. It may contain minor errors which are inherent in voice recognition technology. **      Final report electronically signed by Dr. Vishnu Neff MD on 2/23/2023 6:44 PM          DVT prophylaxis: [] Lovenox                                 [] SCDs                                 [] SQ Heparin                                 [] Encourage ambulation           [x] Already on Anticoagulation-Xarelto     Code Status: Full Code    PT/OT Eval Status: Yes    Tele:   [x] yes             [] no    Electronically signed by Inge Freire MD on 2/24/2023 at 1:32 PM

## 2023-02-24 NOTE — ED NOTES
Pt unsuccessful using the restroom at this time. Pt is back in cot with respirations even and unlabored. Pt voices no concern or need at this time. Call light is within reach.       Jon Hill RN  02/23/23 5229

## 2023-02-24 NOTE — ED NOTES
ED to inpatient nurses report    Chief Complaint   Patient presents with    Positive For Covid-19    Tachycardia      Present to ED from home  LOC: alert and orientated to name, place, date  Vital signs   Vitals:    02/23/23 1936 02/23/23 2048 02/23/23 2122 02/23/23 2222   BP: (!) 87/55 (!) 97/53 (!) 94/51 (!) 95/50   Pulse: 89 90 89 86   Resp: 18 18 18 18   Temp: 99.9 °F (37.7 °C)      TempSrc: Oral      SpO2: 95% 95% 95% 96%   Weight:       Height:          Oxygen Baseline RA    Current needs required RA Bipap/Cpap No  LDAs:   Peripheral IV 02/23/23 Left Antecubital (Active)   Site Assessment Clean, dry & intact 02/23/23 2121   Line Status Normal saline locked 02/23/23 2121   Phlebitis Assessment No symptoms 02/23/23 2121   Infiltration Assessment 0 02/23/23 2121   Dressing Status New dressing applied;Clean, dry & intact 02/23/23 1642   Dressing Type Transparent 02/23/23 1642   Dressing Intervention New 02/23/23 1642     Mobility: Requires assistance * 1  Pending ED orders: None  Present condition: stable    C-SSRS Risk of Suicide: No Risk  Swallow Screening    Preferred Language: [place]     Electronically signed by Joaquín Horowitz RN on 2/23/2023 at 10:39 PM\       Joaquín Horowitz RN  02/23/23 5030
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
## 2023-02-24 NOTE — PLAN OF CARE
Problem: Discharge Planning  Goal: Discharge to home or other facility with appropriate resources  2/24/2023 1827 by Lindell Skiff, RN  Outcome: Progressing  Flowsheets  Taken 2/24/2023 1720  Discharge to home or other facility with appropriate resources: Identify barriers to discharge with patient and caregiver  Taken 2/24/2023 1320  Discharge to home or other facility with appropriate resources: Identify barriers to discharge with patient and caregiver  Taken 2/24/2023 0920  Discharge to home or other facility with appropriate resources: Identify barriers to discharge with patient and caregiver     Problem: Safety - Adult  Goal: Free from fall injury  2/24/2023 1827 by Lindell Skiff, RN  Outcome: Progressing  Flowsheets (Taken 2/24/2023 0920)  Free From Fall Injury: Instruct family/caregiver on patient safety     Problem: ABCDS Injury Assessment  Goal: Absence of physical injury  2/24/2023 1827 by Lindell Skiff, RN  Outcome: Progressing  Flowsheets (Taken 2/24/2023 0920)  Absence of Physical Injury: Implement safety measures based on patient assessment     Problem: Respiratory - Adult  Goal: Achieves optimal ventilation and oxygenation  2/24/2023 1827 by Lindell Skiff, RN  Outcome: Progressing  Flowsheets  Taken 2/24/2023 1720  Achieves optimal ventilation and oxygenation: Assess for changes in respiratory status  Taken 2/24/2023 1320  Achieves optimal ventilation and oxygenation: Assess for changes in respiratory status  Taken 2/24/2023 0920  Achieves optimal ventilation and oxygenation:   Assess for changes in respiratory status   Assess for changes in mentation and behavior   Position to facilitate oxygenation and minimize respiratory effort   Oxygen supplementation based on oxygen saturation or arterial blood gases     Problem: Cardiovascular - Adult  Goal: Maintains optimal cardiac output and hemodynamic stability  2/24/2023 1827 by Lindell Skiff, RN  Outcome: Not Progressing  Flowsheets  Taken 2/24/2023 1720  Maintains optimal cardiac output and hemodynamic stability: Monitor blood pressure and heart rate  Taken 2/24/2023 1320  Maintains optimal cardiac output and hemodynamic stability: Monitor blood pressure and heart rate  Taken 2/24/2023 0920  Maintains optimal cardiac output and hemodynamic stability:   Monitor blood pressure and heart rate   Monitor urine output and notify Licensed Independent Practitioner for values outside of normal range   Assess for signs of decreased cardiac output     Problem: Cardiovascular - Adult  Goal: Absence of cardiac dysrhythmias or at baseline  2/24/2023 1827 by Brandi Hanna RN  Outcome: Not Progressing  Flowsheets  Taken 2/24/2023 1720  Absence of cardiac dysrhythmias or at baseline: Monitor cardiac rate and rhythm  Taken 2/24/2023 1320  Absence of cardiac dysrhythmias or at baseline: Monitor cardiac rate and rhythm  Taken 2/24/2023 0920  Absence of cardiac dysrhythmias or at baseline:   Monitor cardiac rate and rhythm   Assess for signs of decreased cardiac output     Problem: Skin/Tissue Integrity - Adult  Goal: Skin integrity remains intact  2/24/2023 1827 by Brandi Hanna RN  Outcome: Progressing  Flowsheets  Taken 2/24/2023 1720  Skin Integrity Remains Intact: Monitor for areas of redness and/or skin breakdown  Taken 2/24/2023 1320  Skin Integrity Remains Intact: Monitor for areas of redness and/or skin breakdown  Taken 2/24/2023 0920  Skin Integrity Remains Intact: Monitor for areas of redness and/or skin breakdown     Problem: Musculoskeletal - Adult  Goal: Return mobility to safest level of function  2/24/2023 1827 by Brandi Hanna RN  Outcome: Progressing  Flowsheets  Taken 2/24/2023 1720  Return Mobility to Safest Level of Function: Assess patient stability and activity tolerance for standing, transferring and ambulating with or without assistive devices  Taken 2/24/2023 1320  Return Mobility to Safest Level of Function: Assess patient stability  and activity tolerance for standing, transferring and ambulating with or without assistive devices  Taken 2/24/2023 0920  Return Mobility to Safest Level of Function: Assess patient stability and activity tolerance for standing, transferring and ambulating with or without assistive devices     Problem: Gastrointestinal - Adult  Goal: Maintains adequate nutritional intake  2/24/2023 1827 by Alessandra Rowland RN  Outcome: Progressing  Flowsheets  Taken 2/24/2023 1720  Maintains adequate nutritional intake: Monitor percentage of each meal consumed  Taken 2/24/2023 1320  Maintains adequate nutritional intake: Monitor percentage of each meal consumed  Taken 2/24/2023 0920  Maintains adequate nutritional intake: Monitor percentage of each meal consumed     Problem: Infection - Adult  Goal: Absence of infection at discharge  2/24/2023 1827 by Alessandra Rowland RN  Outcome: Progressing  Flowsheets  Taken 2/24/2023 1720  Absence of infection at discharge: Assess and monitor for signs and symptoms of infection  Taken 2/24/2023 1320  Absence of infection at discharge: Assess and monitor for signs and symptoms of infection  Taken 2/24/2023 0920  Absence of infection at discharge: Assess and monitor for signs and symptoms of infection     Problem: Infection - Adult  Goal: Absence of infection during hospitalization  2/24/2023 1827 by Alessandra Rowlnad RN  Outcome: Progressing  Flowsheets  Taken 2/24/2023 1720  Absence of infection during hospitalization: Assess and monitor for signs and symptoms of infection  Taken 2/24/2023 1320  Absence of infection during hospitalization: Assess and monitor for signs and symptoms of infection  Taken 2/24/2023 0920  Absence of infection during hospitalization: Assess and monitor for signs and symptoms of infection     Problem: Chronic Conditions and Co-morbidities  Goal: Patient's chronic conditions and co-morbidity symptoms are monitored and maintained or improved  Outcome: Progressing  Flowsheets  Taken 2/24/2023 1720  Care Plan - Patient's Chronic Conditions and Co-Morbidity Symptoms are Monitored and Maintained or Improved: Monitor and assess patient's chronic conditions and comorbid symptoms for stability, deterioration, or improvement  Taken 2/24/2023 1320  Care Plan - Patient's Chronic Conditions and Co-Morbidity Symptoms are Monitored and Maintained or Improved: Monitor and assess patient's chronic conditions and comorbid symptoms for stability, deterioration, or improvement  Taken 2/24/2023 0920  Care Plan - Patient's Chronic Conditions and Co-Morbidity Symptoms are Monitored and Maintained or Improved: Monitor and assess patient's chronic conditions and comorbid symptoms for stability, deterioration, or improvement     Problem: Cardiovascular - Adult  Goal: Maintains optimal cardiac output and hemodynamic stability  2/24/2023 1827 by Brandi Hanna RN  Outcome: Not Progressing  Flowsheets  Taken 2/24/2023 1720  Maintains optimal cardiac output and hemodynamic stability: Monitor blood pressure and heart rate  Taken 2/24/2023 1320  Maintains optimal cardiac output and hemodynamic stability: Monitor blood pressure and heart rate  Taken 2/24/2023 0920  Maintains optimal cardiac output and hemodynamic stability:   Monitor blood pressure and heart rate   Monitor urine output and notify Licensed Independent Practitioner for values outside of normal range   Assess for signs of decreased cardiac output  2/24/2023 0514 by Elisabeth Morgan RN  Outcome: Progressing  Flowsheets (Taken 2/23/2023 2330)  Maintains optimal cardiac output and hemodynamic stability:   Monitor blood pressure and heart rate   Monitor urine output and notify Licensed Independent Practitioner for values outside of normal range   Assess for signs of decreased cardiac output  Goal: Absence of cardiac dysrhythmias or at baseline  2/24/2023 1827 by Brandi Hanna RN  Outcome: Not Progressing  Flowsheets  Taken  2/24/2023 1720  Absence of cardiac dysrhythmias or at baseline: Monitor cardiac rate and rhythm  Taken 2/24/2023 1320  Absence of cardiac dysrhythmias or at baseline: Monitor cardiac rate and rhythm  Taken 2/24/2023 0920  Absence of cardiac dysrhythmias or at baseline:   Monitor cardiac rate and rhythm   Assess for signs of decreased cardiac output  2/24/2023 0514 by Aleida Riley RN  Outcome: Progressing  Flowsheets (Taken 2/23/2023 2330)  Absence of cardiac dysrhythmias or at baseline:   Monitor cardiac rate and rhythm   Assess for signs of decreased cardiac output   Administer antiarrhythmia medication and electrolyte replacement as ordered

## 2023-02-24 NOTE — ED NOTES
PT is resting in bed. Respirations are even and unlabored. Call light in reach. PT denies needs at this time.      Zabrina Ga RN  02/23/23 2082

## 2023-02-24 NOTE — PROGRESS NOTES
This nurse found patient laying on the floor in her room with bedside commode laying down backwards on the floor as well. Patient denies pain at this time. Sudeep Azevedo MD came into the room to assess the patient. She is aware of her name and , location/hospital, [place] currently, and unaware of current year. No skin Issues, no complaints of pain, no acute injuries noted at this time. Patient also had removed her own IV. With the assistance of two nurses patient was assisted from the floor to a chair. With two RN's skin assessment determined no new skin areas. Patient was assisted back into the bed. Telesitter has been called for and set up. Vital signs are as follows: Temp 102.7 oral, , RR 18, B/P 145/63, pulse ox 94% on Room Air. This nurse Let Sudeep Azevedo MD know about temp and HR and this nurse administered Tylenol per PRN orders. Call light is within reach, Telesitter has been set up. This nurse spoke to patient's son regarding the fall and the plan of care. All questions have been answered and this nurse will monitor the patient.

## 2023-02-25 LAB
ANION GAP SERPL CALC-SCNC: 10 MEQ/L (ref 8–16)
BUN SERPL-MCNC: 8 MG/DL (ref 7–22)
CALCIUM SERPL-MCNC: 6.8 MG/DL (ref 8.5–10.5)
CHLORIDE SERPL-SCNC: 114 MEQ/L (ref 98–111)
CO2 SERPL-SCNC: 18 MEQ/L (ref 23–33)
CREAT SERPL-MCNC: 0.7 MG/DL (ref 0.4–1.2)
DEPRECATED RDW RBC AUTO: 47.4 FL (ref 35–45)
DEPRECATED RDW RBC AUTO: 50 FL (ref 35–45)
ERYTHROCYTE [DISTWIDTH] IN BLOOD BY AUTOMATED COUNT: 14.8 % (ref 11.5–14.5)
ERYTHROCYTE [DISTWIDTH] IN BLOOD BY AUTOMATED COUNT: 15.1 % (ref 11.5–14.5)
GFR SERPL CREATININE-BSD FRML MDRD: > 60 ML/MIN/1.73M2
GLUCOSE BLD STRIP.AUTO-MCNC: 155 MG/DL (ref 70–108)
GLUCOSE BLD STRIP.AUTO-MCNC: 167 MG/DL (ref 70–108)
GLUCOSE BLD STRIP.AUTO-MCNC: 191 MG/DL (ref 70–108)
GLUCOSE BLD STRIP.AUTO-MCNC: 257 MG/DL (ref 70–108)
GLUCOSE SERPL-MCNC: 175 MG/DL (ref 70–108)
HCT VFR BLD AUTO: 24.1 % (ref 37–47)
HCT VFR BLD AUTO: 32.3 % (ref 37–47)
HGB BLD-MCNC: 10.2 GM/DL (ref 12–16)
HGB BLD-MCNC: 7.6 GM/DL (ref 12–16)
MAGNESIUM SERPL-MCNC: 1.3 MG/DL (ref 1.6–2.4)
MCH RBC QN AUTO: 27.4 PG (ref 26–33)
MCH RBC QN AUTO: 28.5 PG (ref 26–33)
MCHC RBC AUTO-ENTMCNC: 31.5 GM/DL (ref 32.2–35.5)
MCHC RBC AUTO-ENTMCNC: 31.6 GM/DL (ref 32.2–35.5)
MCV RBC AUTO: 86.8 FL (ref 81–99)
MCV RBC AUTO: 90.3 FL (ref 81–99)
PLATELET # BLD AUTO: 146 THOU/MM3 (ref 130–400)
PLATELET # BLD AUTO: 209 THOU/MM3 (ref 130–400)
PMV BLD AUTO: 9.3 FL (ref 9.4–12.4)
PMV BLD AUTO: 9.3 FL (ref 9.4–12.4)
POTASSIUM SERPL-SCNC: 3.2 MEQ/L (ref 3.5–5.2)
RBC # BLD AUTO: 2.67 MILL/MM3 (ref 4.2–5.4)
RBC # BLD AUTO: 3.72 MILL/MM3 (ref 4.2–5.4)
SODIUM SERPL-SCNC: 142 MEQ/L (ref 135–145)
WBC # BLD AUTO: 10.7 THOU/MM3 (ref 4.8–10.8)
WBC # BLD AUTO: 9.6 THOU/MM3 (ref 4.8–10.8)

## 2023-02-25 PROCEDURE — 83735 ASSAY OF MAGNESIUM: CPT

## 2023-02-25 PROCEDURE — 36415 COLL VENOUS BLD VENIPUNCTURE: CPT

## 2023-02-25 PROCEDURE — 96361 HYDRATE IV INFUSION ADD-ON: CPT

## 2023-02-25 PROCEDURE — 2500000003 HC RX 250 WO HCPCS: Performed by: PHYSICIAN ASSISTANT

## 2023-02-25 PROCEDURE — G0378 HOSPITAL OBSERVATION PER HR: HCPCS

## 2023-02-25 PROCEDURE — 80048 BASIC METABOLIC PNL TOTAL CA: CPT

## 2023-02-25 PROCEDURE — 85027 COMPLETE CBC AUTOMATED: CPT

## 2023-02-25 PROCEDURE — 99233 SBSQ HOSP IP/OBS HIGH 50: CPT | Performed by: HOSPITALIST

## 2023-02-25 PROCEDURE — 6370000000 HC RX 637 (ALT 250 FOR IP): Performed by: STUDENT IN AN ORGANIZED HEALTH CARE EDUCATION/TRAINING PROGRAM

## 2023-02-25 PROCEDURE — 6370000000 HC RX 637 (ALT 250 FOR IP): Performed by: PHYSICIAN ASSISTANT

## 2023-02-25 PROCEDURE — 96376 TX/PRO/DX INJ SAME DRUG ADON: CPT

## 2023-02-25 PROCEDURE — 2580000003 HC RX 258: Performed by: STUDENT IN AN ORGANIZED HEALTH CARE EDUCATION/TRAINING PROGRAM

## 2023-02-25 PROCEDURE — 82948 REAGENT STRIP/BLOOD GLUCOSE: CPT

## 2023-02-25 RX ORDER — ACETAMINOPHEN 650 MG/1
650 SUPPOSITORY RECTAL EVERY 6 HOURS PRN
Status: DISCONTINUED | OUTPATIENT
Start: 2023-02-25 | End: 2023-02-27 | Stop reason: HOSPADM

## 2023-02-25 RX ORDER — 0.9 % SODIUM CHLORIDE 0.9 %
500 INTRAVENOUS SOLUTION INTRAVENOUS ONCE
Status: COMPLETED | OUTPATIENT
Start: 2023-02-26 | End: 2023-02-26

## 2023-02-25 RX ORDER — GUAIFENESIN 600 MG/1
600 TABLET, EXTENDED RELEASE ORAL 2 TIMES DAILY
Status: DISCONTINUED | OUTPATIENT
Start: 2023-02-25 | End: 2023-02-27 | Stop reason: HOSPADM

## 2023-02-25 RX ORDER — ACETAMINOPHEN 325 MG/1
325 TABLET ORAL ONCE
Status: COMPLETED | OUTPATIENT
Start: 2023-02-25 | End: 2023-02-25

## 2023-02-25 RX ORDER — ACETAMINOPHEN 325 MG/1
650 TABLET ORAL EVERY 4 HOURS PRN
Status: DISCONTINUED | OUTPATIENT
Start: 2023-02-25 | End: 2023-02-27 | Stop reason: HOSPADM

## 2023-02-25 RX ORDER — BENZONATATE 100 MG/1
200 CAPSULE ORAL 3 TIMES DAILY PRN
Status: DISCONTINUED | OUTPATIENT
Start: 2023-02-25 | End: 2023-02-27 | Stop reason: HOSPADM

## 2023-02-25 RX ORDER — IBUPROFEN 600 MG/1
600 TABLET ORAL EVERY 6 HOURS PRN
Status: DISCONTINUED | OUTPATIENT
Start: 2023-02-25 | End: 2023-02-26

## 2023-02-25 RX ORDER — METOPROLOL TARTRATE 5 MG/5ML
5 INJECTION INTRAVENOUS ONCE
Status: COMPLETED | OUTPATIENT
Start: 2023-02-26 | End: 2023-02-25

## 2023-02-25 RX ADMIN — METOPROLOL TARTRATE 5 MG: 5 INJECTION, SOLUTION INTRAVENOUS at 23:54

## 2023-02-25 RX ADMIN — ACETAMINOPHEN 650 MG: 325 TABLET ORAL at 00:33

## 2023-02-25 RX ADMIN — LEVOTHYROXINE SODIUM 50 MCG: 0.05 TABLET ORAL at 06:22

## 2023-02-25 RX ADMIN — ACETAMINOPHEN 650 MG: 325 TABLET ORAL at 20:16

## 2023-02-25 RX ADMIN — PANTOPRAZOLE SODIUM 40 MG: 40 TABLET, DELAYED RELEASE ORAL at 06:21

## 2023-02-25 RX ADMIN — IBUPROFEN 600 MG: 600 TABLET ORAL at 22:01

## 2023-02-25 RX ADMIN — BENZONATATE 200 MG: 100 CAPSULE ORAL at 22:01

## 2023-02-25 RX ADMIN — METOPROLOL SUCCINATE 50 MG: 50 TABLET, EXTENDED RELEASE ORAL at 20:18

## 2023-02-25 RX ADMIN — GUAIFENESIN 600 MG: 600 TABLET, EXTENDED RELEASE ORAL at 22:01

## 2023-02-25 RX ADMIN — NORTRIPTYLINE HYDROCHLORIDE 10 MG: 10 CAPSULE ORAL at 20:18

## 2023-02-25 RX ADMIN — SODIUM CHLORIDE, PRESERVATIVE FREE 10 ML: 5 INJECTION INTRAVENOUS at 08:09

## 2023-02-25 RX ADMIN — FLECAINIDE ACETATE 50 MG: 50 TABLET ORAL at 20:18

## 2023-02-25 RX ADMIN — SODIUM CHLORIDE, PRESERVATIVE FREE 10 ML: 5 INJECTION INTRAVENOUS at 20:19

## 2023-02-25 RX ADMIN — DIVALPROEX SODIUM 250 MG: 250 TABLET, FILM COATED, EXTENDED RELEASE ORAL at 08:09

## 2023-02-25 RX ADMIN — INSULIN LISPRO 4 UNITS: 100 INJECTION, SOLUTION INTRAVENOUS; SUBCUTANEOUS at 17:14

## 2023-02-25 RX ADMIN — ACETAMINOPHEN 325 MG: 325 TABLET ORAL at 22:01

## 2023-02-25 RX ADMIN — RIVAROXABAN 20 MG: 20 TABLET, FILM COATED ORAL at 08:09

## 2023-02-25 NOTE — PLAN OF CARE
Problem: Discharge Planning  Goal: Discharge to home or other facility with appropriate resources  Outcome: Progressing  Flowsheets (Taken 2/25/2023 0857)  Discharge to home or other facility with appropriate resources: Identify barriers to discharge with patient and caregiver     Problem: Safety - Adult  Goal: Free from fall injury  Outcome: Progressing  Flowsheets (Taken 2/25/2023 0715)  Free From Fall Injury: Instruct family/caregiver on patient safety     Problem: ABCDS Injury Assessment  Goal: Absence of physical injury  Outcome: Progressing     Problem: Respiratory - Adult  Goal: Achieves optimal ventilation and oxygenation  Outcome: Progressing  Flowsheets (Taken 2/25/2023 0857)  Achieves optimal ventilation and oxygenation:   Assess for changes in respiratory status   Assess for changes in mentation and behavior   Position to facilitate oxygenation and minimize respiratory effort   Oxygen supplementation based on oxygen saturation or arterial blood gases     Problem: Cardiovascular - Adult  Goal: Maintains optimal cardiac output and hemodynamic stability  Outcome: Progressing  Flowsheets (Taken 2/25/2023 0857)  Maintains optimal cardiac output and hemodynamic stability: Monitor blood pressure and heart rate     Problem: Cardiovascular - Adult  Goal: Absence of cardiac dysrhythmias or at baseline  Outcome: Progressing  Flowsheets (Taken 2/25/2023 0857)  Absence of cardiac dysrhythmias or at baseline: Monitor cardiac rate and rhythm     Problem: Skin/Tissue Integrity - Adult  Goal: Skin integrity remains intact  Outcome: Progressing  Flowsheets  Taken 2/25/2023 0857  Skin Integrity Remains Intact: Monitor for areas of redness and/or skin breakdown  Taken 2/25/2023 0715  Skin Integrity Remains Intact: Monitor for areas of redness and/or skin breakdown     Problem: Musculoskeletal - Adult  Goal: Return mobility to safest level of function  Outcome: Progressing  Flowsheets (Taken 2/25/2023 0857)  Return Mobility to Safest Level of Function: Assess patient stability and activity tolerance for standing, transferring and ambulating with or without assistive devices     Problem: Gastrointestinal - Adult  Goal: Maintains adequate nutritional intake  Outcome: Progressing  Flowsheets (Taken 2/25/2023 0857)  Maintains adequate nutritional intake: Monitor percentage of each meal consumed     Problem: Infection - Adult  Goal: Absence of infection at discharge  Outcome: Progressing  Flowsheets (Taken 2/25/2023 0857)  Absence of infection at discharge: Assess and monitor for signs and symptoms of infection     Problem: Infection - Adult  Goal: Absence of infection during hospitalization  Outcome: Progressing  Flowsheets (Taken 2/25/2023 0857)  Absence of infection during hospitalization: Assess and monitor for signs and symptoms of infection     Problem: Chronic Conditions and Co-morbidities  Goal: Patient's chronic conditions and co-morbidity symptoms are monitored and maintained or improved  Outcome: Progressing  Flowsheets (Taken 2/25/2023 0857)  Care Plan - Patient's Chronic Conditions and Co-Morbidity Symptoms are Monitored and Maintained or Improved: Monitor and assess patient's chronic conditions and comorbid symptoms for stability, deterioration, or improvement     Problem: Pain  Goal: Verbalizes/displays adequate comfort level or baseline comfort level  Outcome: Progressing  Flowsheets  Taken 2/25/2023 1115  Verbalizes/displays adequate comfort level or baseline comfort level: Encourage patient to monitor pain and request assistance  Taken 2/25/2023 0715  Verbalizes/displays adequate comfort level or baseline comfort level: Encourage patient to monitor pain and request assistance

## 2023-02-25 NOTE — PLAN OF CARE
Problem: Discharge Planning  Goal: Discharge to home or other facility with appropriate resources  2/25/2023 0331 by Tanvir Castillo RN  Outcome: Progressing  2/24/2023 1827 by Ayush Lawson RN  Outcome: Progressing  Flowsheets  Taken 2/24/2023 1720  Discharge to home or other facility with appropriate resources: Identify barriers to discharge with patient and caregiver  Taken 2/24/2023 1320  Discharge to home or other facility with appropriate resources: Identify barriers to discharge with patient and caregiver  Taken 2/24/2023 0920  Discharge to home or other facility with appropriate resources: Identify barriers to discharge with patient and caregiver     Problem: Safety - Adult  Goal: Free from fall injury  2/25/2023 0331 by Tanvir Castillo RN  Outcome: Progressing  Note: Call light within reach, bed alarm on, sitter with patient   2/24/2023 1827 by Ayush Lawson RN  Outcome: Progressing  Flowsheets (Taken 2/24/2023 0920)  Free From Fall Injury: Instruct family/caregiver on patient safety     Problem: ABCDS Injury Assessment  Goal: Absence of physical injury  2/25/2023 0331 by Tanvir Castillo RN  Outcome: Progressing  2/24/2023 1827 by Ayush Lawson RN  Outcome: Progressing  Flowsheets (Taken 2/24/2023 0920)  Absence of Physical Injury: Implement safety measures based on patient assessment     Problem: Respiratory - Adult  Goal: Achieves optimal ventilation and oxygenation  2/25/2023 0331 by Tanvir Castillo RN  Outcome: Progressing  2/24/2023 1827 by Ayush Lawson RN  Outcome: Progressing  Flowsheets  Taken 2/24/2023 1720  Achieves optimal ventilation and oxygenation: Assess for changes in respiratory status  Taken 2/24/2023 1320  Achieves optimal ventilation and oxygenation: Assess for changes in respiratory status  Taken 2/24/2023 0920  Achieves optimal ventilation and oxygenation:   Assess for changes in respiratory status   Assess for changes in mentation and behavior   Position to facilitate oxygenation and minimize respiratory effort   Oxygen supplementation based on oxygen saturation or arterial blood gases     Problem: Cardiovascular - Adult  Goal: Maintains optimal cardiac output and hemodynamic stability  2/25/2023 0331 by Sveta Arauz RN  Outcome: Progressing  Note: Patient is on a medication drip to improve HR.  Vitals are WNL  2/24/2023 1827 by Ricco Taveras RN  Outcome: Not Progressing  Flowsheets  Taken 2/24/2023 1720  Maintains optimal cardiac output and hemodynamic stability: Monitor blood pressure and heart rate  Taken 2/24/2023 1320  Maintains optimal cardiac output and hemodynamic stability: Monitor blood pressure and heart rate  Taken 2/24/2023 0920  Maintains optimal cardiac output and hemodynamic stability:   Monitor blood pressure and heart rate   Monitor urine output and notify Licensed Independent Practitioner for values outside of normal range   Assess for signs of decreased cardiac output  Goal: Absence of cardiac dysrhythmias or at baseline  2/25/2023 0331 by Sveta Arauz RN  Outcome: Progressing  2/24/2023 1827 by Ricco Taveras RN  Outcome: Not Progressing  Flowsheets  Taken 2/24/2023 1720  Absence of cardiac dysrhythmias or at baseline: Monitor cardiac rate and rhythm  Taken 2/24/2023 1320  Absence of cardiac dysrhythmias or at baseline: Monitor cardiac rate and rhythm  Taken 2/24/2023 0920  Absence of cardiac dysrhythmias or at baseline:   Monitor cardiac rate and rhythm   Assess for signs of decreased cardiac output     Problem: Skin/Tissue Integrity - Adult  Goal: Skin integrity remains intact  2/25/2023 0331 by Sveta Arauz RN  Outcome: Progressing  Note: Patient is able to turn in bed and get out of bed to help improve skin integrity   2/24/2023 1827 by Ricco Taveras RN  Outcome: Progressing  Flowsheets  Taken 2/24/2023 1720  Skin Integrity Remains Intact: Monitor for areas of redness and/or skin breakdown  Taken 2/24/2023 1320  Skin Integrity Remains Intact: Monitor for areas of redness and/or skin breakdown  Taken 2/24/2023 0920  Skin Integrity Remains Intact: Monitor for areas of redness and/or skin breakdown     Problem: Musculoskeletal - Adult  Goal: Return mobility to safest level of function  2/25/2023 0331 by Amparo Hollis RN  Outcome: Progressing  Note: Patient is able to ambulate in the room with assist   2/24/2023 1827 by Rebecca Montemayor RN  Outcome: Progressing  Flowsheets  Taken 2/24/2023 1720  Return Mobility to Safest Level of Function: Assess patient stability and activity tolerance for standing, transferring and ambulating with or without assistive devices  Taken 2/24/2023 1320  Return Mobility to Safest Level of Function: Assess patient stability and activity tolerance for standing, transferring and ambulating with or without assistive devices  Taken 2/24/2023 0920  Return Mobility to Safest Level of Function: Assess patient stability and activity tolerance for standing, transferring and ambulating with or without assistive devices     Problem: Gastrointestinal - Adult  Goal: Maintains adequate nutritional intake  2/25/2023 0331 by Amparo Hollis RN  Outcome: Progressing  2/24/2023 1827 by Rebecca Montemayor RN  Outcome: Progressing  Flowsheets  Taken 2/24/2023 1720  Maintains adequate nutritional intake: Monitor percentage of each meal consumed  Taken 2/24/2023 1320  Maintains adequate nutritional intake: Monitor percentage of each meal consumed  Taken 2/24/2023 0920  Maintains adequate nutritional intake: Monitor percentage of each meal consumed     Problem: Infection - Adult  Goal: Absence of infection at discharge  2/25/2023 0331 by Amparo Hollis RN  Outcome: Progressing  2/24/2023 1827 by Rebecca Montemayor RN  Outcome: Progressing  Flowsheets  Taken 2/24/2023 1720  Absence of infection at discharge: Assess and monitor for signs and symptoms of infection  Taken 2/24/2023 1320  Absence of infection at discharge: Assess and monitor for signs and symptoms of infection  Taken 2/24/2023 0920  Absence of infection at discharge: Assess and monitor for signs and symptoms of infection  Goal: Absence of infection during hospitalization  2/25/2023 0331 by Jeremias Mcwilliams RN  Outcome: Progressing  Note: Fever is being treated with medication   2/24/2023 1827 by Lianet Mehta RN  Outcome: Progressing  Flowsheets  Taken 2/24/2023 1720  Absence of infection during hospitalization: Assess and monitor for signs and symptoms of infection  Taken 2/24/2023 1320  Absence of infection during hospitalization: Assess and monitor for signs and symptoms of infection  Taken 2/24/2023 0920  Absence of infection during hospitalization: Assess and monitor for signs and symptoms of infection     Problem: Chronic Conditions and Co-morbidities  Goal: Patient's chronic conditions and co-morbidity symptoms are monitored and maintained or improved  2/25/2023 0331 by Jeremias Mcwilliams RN  Outcome: Progressing  2/24/2023 1827 by Lianet Mehta RN  Outcome: Progressing  Flowsheets  Taken 2/24/2023 1720  Care Plan - Patient's Chronic Conditions and Co-Morbidity Symptoms are Monitored and Maintained or Improved: Monitor and assess patient's chronic conditions and comorbid symptoms for stability, deterioration, or improvement  Taken 2/24/2023 1320  Care Plan - Patient's Chronic Conditions and Co-Morbidity Symptoms are Monitored and Maintained or Improved: Monitor and assess patient's chronic conditions and comorbid symptoms for stability, deterioration, or improvement  Taken 2/24/2023 0920  Care Plan - Patient's Chronic Conditions and Co-Morbidity Symptoms are Monitored and Maintained or Improved: Monitor and assess patient's chronic conditions and comorbid symptoms for stability, deterioration, or improvement     Problem: Cardiovascular - Adult  Goal: Maintains optimal cardiac output and hemodynamic stability  2/25/2023 0331 by Jeremias Mcwilliams RN  Outcome: Progressing  Note: Patient is on a medication drip to improve HR.  Vitals are WNL  2/24/2023 1827 by Lianet Mehta RN  Outcome: Not Progressing  Flowsheets  Taken 2/24/2023 1720  Maintains optimal cardiac output and hemodynamic stability: Monitor blood pressure and heart rate  Taken 2/24/2023 1320  Maintains optimal cardiac output and hemodynamic stability: Monitor blood pressure and heart rate  Taken 2/24/2023 0920  Maintains optimal cardiac output and hemodynamic stability:   Monitor blood pressure and heart rate   Monitor urine output and notify Licensed Independent Practitioner for values outside of normal range   Assess for signs of decreased cardiac output  Goal: Absence of cardiac dysrhythmias or at baseline  2/25/2023 0331 by Jeremias cMwilliams RN  Outcome: Progressing  2/24/2023 1827 by Lianet Mehta RN  Outcome: Not Progressing  Flowsheets  Taken 2/24/2023 1720  Absence of cardiac dysrhythmias or at baseline: Monitor cardiac rate and rhythm  Taken 2/24/2023 1320  Absence of cardiac dysrhythmias or at baseline: Monitor cardiac rate and rhythm  Taken 2/24/2023 0920  Absence of cardiac dysrhythmias or at baseline:   Monitor cardiac rate and rhythm   Assess for signs of decreased cardiac output

## 2023-02-25 NOTE — PROGRESS NOTES
Patient had a telesitter in the room with them to redirect and prevent falls. The patient was not listening to the telesitter and was getting out of bed. Writer asked charge if the patient could have a sitter. The sitter went into the room around 2200. The patient was still acting out and acting confused with the sitter present.

## 2023-02-25 NOTE — PROGRESS NOTES
Hospitalist Progress Note    Patient:  Latasha Regalado      Unit/Bed:6K-26/026-A    YOB: 1950    MRN: [id number]       Acct: [de-identified]     PCP: William Wisdom    Date of Admission: 2/23/2023    Assessment/Plan:    Sepsis (resolved)  Supported by 4/4 SIRS criteria (febrile, elevated WBC, tachypnea, tachycardia) + source (COVID-19). Received IV fluid bolus. Received ceftriaxone x2 doses for suspected UTI (now ruled out). Blood Cx x2. C/w IV fluid maintenance. Feeling improved. COVID-19  Positive PCR 02/23. CXR without evidence of acute process. No hypoxia. No indication for Decadron. Contact plus isolation. Pt does not have signs and symptoms of moderate to severe COVID PNA. Evidence does not support any available treatments at this time. Will continue w/ supportive management and monitor. Atrial fibrillation with RVR (resolved)  Hx of PAF. OP regimen: metoprolol and flecainide. [id number] 4. DOAC resumed. Short episode of A-fib RVR (130-140) despite resuming oral home mede likely secondary to sepsis/COVID-19 infection for which started on Diltiazem Infusion w/ adequate response. D/c'ed IV CCB. Continue w/ home meds and monitor. Maintain telemetry    Hx of HTN  BP initially soft likely 2/2 dehydration/inadequate PO intake. fairly well-controlled now. Metoprolol resumed with hold parameters; home ACEi on hold. CCM for now and monitor. Consider resuming ACE at discharge (likely for nephropathy protection given DM)    Hx of SSS  S/p PPM    Type 2 Diabetes Mellitus, complicated by peripheral neuropathy  Last hemoglobin A1c was 8.7 on 01/2023. On OHAs at home which held on admission. Medium dose corrective algorithm initiated. Hypoglycemic protocol. BS fairly controlled.  CCM and trend    Hx of Hypothyroidism  Home levothyroxine resumed; last TSH from 12/31/22 WNLs     Hx of HLD  Noted in history    Hx of GERD  PPI resumed    Hx of Anxiety   Nortriptyline resumed Fibromyalgia   Noted    Expected discharge date:  TBD    Disposition:    [] Home       [] TCU       [] Rehab       [] Psych       [] SNF       [] [place]       [x] Other-    Chief Complaint: Headache    Hospital Course:   35-year-old female with history of HTN, PAF, s/p PPM, DM2, hypothyroidism, HLD, anxiety and fibromyalgia who presented to [facility] for evaluation of a headache. The patient states her symptoms started 2 days ago. It is associated with fevers, diarrhea, confusion and fatigue/weakness. She denies any nausea, vomiting, chest pain, shortness of breath, abdominal or urinary complaints. Patient reports yesterday evening while using the restroom she bent over to reach something and immediately lost consciousness, hitting her head. She woke up on the floor but was too weak to get up, laying there for 6 hours. She eventually found strength of her chart to PCP advised her to come in for evaluation. Upon arrival, vitals included Tmax 101.6, RR 20, , /54, SPO2 95% on RA. Labs sent for CL 96, CO2 22, AG 18, glucose 180, WBC 14.5. Troponin WNL. COVID-19 positive. CXR with no acute process. CT head and cervical spine with no acute abnormalities. Patient was given 1 L normal saline bolus. Hospitalist was contacted for admission. Please see A&P for further information. Subjective (past 24 hours):   Patient feeling much improved. Appetite improved w/ adequate PO intake. Denies CP/SOB/fever/chills/cough/sputum/presyncope/palpitation/GI or  symptoms. ROS (12 point review of systems completed. Pertinent positives noted. Otherwise ROS is negative).     Medications:  Reviewed    Infusion Medications    dilTIAZem 5 mg/hr (02/24/23 1839)    dextrose      sodium chloride 25 mL/hr at 02/24/23 1231     Scheduled Medications    divalproex  250 mg Oral Daily    nortriptyline  10 mg Oral Nightly    calcium replacement protocol   Other RX Placeholder    flecainide  50 mg Oral BID    levothyroxine  50 mcg Oral Daily    metoprolol succinate  50 mg Oral BID    [Held by provider] gabapentin  600 mg Oral 4x Daily    pantoprazole  40 mg Oral QAM AC    rivaroxaban  20 mg Oral Daily with breakfast    insulin lispro  0-8 Units SubCUTAneous TID WC    insulin lispro  0-4 Units SubCUTAneous Nightly    sodium chloride flush  5-40 mL IntraVENous 2 times per day     PRN Meds: acetaminophen **OR** acetaminophen, meclizine, clonazePAM, glucose, dextrose bolus **OR** dextrose bolus, glucagon (rDNA), dextrose, sodium chloride flush, sodium chloride, ondansetron **OR** ondansetron, polyethylene glycol      Intake/Output Summary (Last 24 hours) at 2/25/2023 0759  Last data filed at 2/25/2023 0615  Gross per 24 hour   Intake --   Output 1650 ml   Net -1650 ml         Diet:  ADULT DIET; Regular    Exam:  /60   Pulse 82   Temp 98 °F (36.7 °C) (Oral)   Resp 16   Ht 5' 4\" (1.626 m)   Wt 138 lb 14.2 oz (63 kg)   SpO2 90%    L/min   BMI 23.84 kg/m²     General appearance: Looking tired and ill but not toxic. No apparent distress, appears stated age and cooperative. Acutely ill-appearing. HEENT: Pupils equal, round, and reactive to light. Conjunctivae/corneas clear. Neck: Supple, with full range of motion. No jugular venous distention. Trachea midline. Respiratory:  Normal respiratory effort. Clear to auscultation, bilaterally without Rales/Wheezes/Rhonchi. Cardiovascular: Regular rate and rhythm with normal S1/S2 without murmurs, rubs or gallops. Abdomen: Soft, non-tender, non-distended with normal bowel sounds. Musculoskeletal: passive and active ROM x 4 extremities. Skin: Skin color, texture, turgor normal.  No rashes or lesions. Neurologic:  Neurovascularly intact without any focal sensory/motor deficits.  Cranial nerves: II-XII intact, grossly non-focal.  Psychiatric: Alert and oriented, thought content appropriate, normal insight  Capillary Refill: Brisk,< 3 seconds   Peripheral Pulses: +2 palpable, equal bilaterally       Labs:   Recent Labs     02/23/23  1740 02/24/23  0534   WBC 14.5* 13.6*   HGB 12.7 12.5   HCT 38.3 41.0    181       Recent Labs     02/23/23  1740 02/24/23  0534    139   K 3.7 4.3   CL 96* 106   CO2 22* 20*   BUN 15 15   CREATININE 0.9 0.9   CALCIUM 9.0 8.0*   PHOS  --  3.2       No results for input(s): AST, ALT, BILIDIR, BILITOT, ALKPHOS in the last 72 hours. No results for input(s): INR in the last 72 hours. Recent Labs     02/24/23  0534   CKTOTAL 1,520*         Microbiology:      Urinalysis:      Lab Results   Component Value Date/Time    NITRU NEGATIVE 02/23/2023 09:10 PM    WBCUA 50-75 02/23/2023 09:10 PM    BACTERIA NONE SEEN 02/23/2023 09:10 PM    RBCUA 3-5 02/23/2023 09:10 PM    BLOODU NEGATIVE 02/23/2023 09:10 PM    SPECGRAV 1.030 12/30/2020 11:28 AM    SPECGRAV >=1.030 11/04/2015 09:55 AM    GLUCOSEU NEGATIVE 02/23/2023 09:10 PM       Radiology:  CT HEAD WO CONTRAST   Final Result    No evidence of acute intracranial abnormality. **This report has been created using voice recognition software. It may contain minor errors which are inherent in voice recognition technology. **      Final report electronically signed by Dr. James Ba MD on 2/23/2023 6:56 PM      CT CERVICAL SPINE WO CONTRAST   Final Result    No evidence of acute osseous injury of the cervical spine. **This report has been created using voice recognition software. It may contain minor errors which are inherent in voice recognition technology. **      Final report electronically signed by Dr. James Ba MD on 2/23/2023 6:57 PM      XR CHEST (2 VW)   Final Result   Stable radiographic appearance of the chest. No evidence of an acute process. **This report has been created using voice recognition software. It may contain minor errors which are inherent in voice recognition technology. **      Final report electronically signed by Dr. Kayce Hardwick MD on 2/23/2023 6:44 PM          DVT prophylaxis: [] Lovenox                                 [] SCDs                                 [] SQ Heparin                                 [] Encourage ambulation           [x] Already on Anticoagulation-Xarelto     Code Status: Full Code    PT/OT Eval Status: Yes    Tele:   [x] yes             [] no  With RN in room, patient was updated about and agreed upon the treatment plan, all the questions and concerns were addressed.     Electronically signed by Gina Weller MD on 2/25/2023 at 7:59 AM

## 2023-02-26 LAB
ANION GAP SERPL CALC-SCNC: 13 MEQ/L (ref 8–16)
BACTERIA UR CULT: ABNORMAL
BUN SERPL-MCNC: 8 MG/DL (ref 7–22)
CALCIUM SERPL-MCNC: 8.2 MG/DL (ref 8.5–10.5)
CHLORIDE SERPL-SCNC: 109 MEQ/L (ref 98–111)
CO2 SERPL-SCNC: 19 MEQ/L (ref 23–33)
CREAT SERPL-MCNC: 0.7 MG/DL (ref 0.4–1.2)
DEPRECATED RDW RBC AUTO: 47.3 FL (ref 35–45)
ERYTHROCYTE [DISTWIDTH] IN BLOOD BY AUTOMATED COUNT: 14.7 % (ref 11.5–14.5)
GFR SERPL CREATININE-BSD FRML MDRD: > 60 ML/MIN/1.73M2
GLUCOSE BLD STRIP.AUTO-MCNC: 146 MG/DL (ref 70–108)
GLUCOSE BLD STRIP.AUTO-MCNC: 161 MG/DL (ref 70–108)
GLUCOSE BLD STRIP.AUTO-MCNC: 237 MG/DL (ref 70–108)
GLUCOSE BLD STRIP.AUTO-MCNC: 297 MG/DL (ref 70–108)
GLUCOSE SERPL-MCNC: 153 MG/DL (ref 70–108)
HCT VFR BLD AUTO: 32.6 % (ref 37–47)
HGB BLD-MCNC: 10.5 GM/DL (ref 12–16)
MCH RBC QN AUTO: 28 PG (ref 26–33)
MCHC RBC AUTO-ENTMCNC: 32.2 GM/DL (ref 32.2–35.5)
MCV RBC AUTO: 86.9 FL (ref 81–99)
ORGANISM: ABNORMAL
PLATELET # BLD AUTO: 189 THOU/MM3 (ref 130–400)
PMV BLD AUTO: 9.3 FL (ref 9.4–12.4)
POTASSIUM SERPL-SCNC: 3.9 MEQ/L (ref 3.5–5.2)
RBC # BLD AUTO: 3.75 MILL/MM3 (ref 4.2–5.4)
SODIUM SERPL-SCNC: 141 MEQ/L (ref 135–145)
TSH SERPL DL<=0.005 MIU/L-ACNC: 2.33 UIU/ML (ref 0.4–4.2)
WBC # BLD AUTO: 8.9 THOU/MM3 (ref 4.8–10.8)

## 2023-02-26 PROCEDURE — 80048 BASIC METABOLIC PNL TOTAL CA: CPT

## 2023-02-26 PROCEDURE — 6370000000 HC RX 637 (ALT 250 FOR IP): Performed by: PHYSICIAN ASSISTANT

## 2023-02-26 PROCEDURE — 6370000000 HC RX 637 (ALT 250 FOR IP): Performed by: STUDENT IN AN ORGANIZED HEALTH CARE EDUCATION/TRAINING PROGRAM

## 2023-02-26 PROCEDURE — 99233 SBSQ HOSP IP/OBS HIGH 50: CPT | Performed by: HOSPITALIST

## 2023-02-26 PROCEDURE — 84443 ASSAY THYROID STIM HORMONE: CPT

## 2023-02-26 PROCEDURE — 96361 HYDRATE IV INFUSION ADD-ON: CPT

## 2023-02-26 PROCEDURE — 2580000003 HC RX 258: Performed by: PHYSICIAN ASSISTANT

## 2023-02-26 PROCEDURE — 6360000002 HC RX W HCPCS: Performed by: HOSPITALIST

## 2023-02-26 PROCEDURE — 82948 REAGENT STRIP/BLOOD GLUCOSE: CPT

## 2023-02-26 PROCEDURE — 96366 THER/PROPH/DIAG IV INF ADDON: CPT

## 2023-02-26 PROCEDURE — 36415 COLL VENOUS BLD VENIPUNCTURE: CPT

## 2023-02-26 PROCEDURE — 2580000003 HC RX 258: Performed by: STUDENT IN AN ORGANIZED HEALTH CARE EDUCATION/TRAINING PROGRAM

## 2023-02-26 PROCEDURE — G0378 HOSPITAL OBSERVATION PER HR: HCPCS

## 2023-02-26 PROCEDURE — 85027 COMPLETE CBC AUTOMATED: CPT

## 2023-02-26 PROCEDURE — 6370000000 HC RX 637 (ALT 250 FOR IP): Performed by: HOSPITALIST

## 2023-02-26 RX ORDER — METOPROLOL SUCCINATE 100 MG/1
100 TABLET, EXTENDED RELEASE ORAL 2 TIMES DAILY
Status: DISCONTINUED | OUTPATIENT
Start: 2023-02-26 | End: 2023-02-27 | Stop reason: HOSPADM

## 2023-02-26 RX ORDER — MAGNESIUM SULFATE IN WATER 40 MG/ML
2000 INJECTION, SOLUTION INTRAVENOUS ONCE
Status: COMPLETED | OUTPATIENT
Start: 2023-02-26 | End: 2023-02-26

## 2023-02-26 RX ORDER — METOPROLOL TARTRATE 50 MG/1
50 TABLET, FILM COATED ORAL
Status: DISCONTINUED | OUTPATIENT
Start: 2023-02-26 | End: 2023-02-26

## 2023-02-26 RX ORDER — METOPROLOL SUCCINATE 50 MG/1
50 TABLET, EXTENDED RELEASE ORAL ONCE
Status: COMPLETED | OUTPATIENT
Start: 2023-02-26 | End: 2023-02-26

## 2023-02-26 RX ADMIN — METOPROLOL SUCCINATE 50 MG: 50 TABLET, EXTENDED RELEASE ORAL at 14:21

## 2023-02-26 RX ADMIN — METOPROLOL SUCCINATE 50 MG: 50 TABLET, EXTENDED RELEASE ORAL at 07:55

## 2023-02-26 RX ADMIN — SODIUM CHLORIDE, PRESERVATIVE FREE 10 ML: 5 INJECTION INTRAVENOUS at 07:56

## 2023-02-26 RX ADMIN — INSULIN LISPRO 1 UNITS: 100 INJECTION, SOLUTION INTRAVENOUS; SUBCUTANEOUS at 11:57

## 2023-02-26 RX ADMIN — FLECAINIDE ACETATE 50 MG: 50 TABLET ORAL at 07:55

## 2023-02-26 RX ADMIN — BENZONATATE 200 MG: 100 CAPSULE ORAL at 06:19

## 2023-02-26 RX ADMIN — FLECAINIDE ACETATE 50 MG: 50 TABLET ORAL at 20:49

## 2023-02-26 RX ADMIN — SODIUM CHLORIDE, PRESERVATIVE FREE 10 ML: 5 INJECTION INTRAVENOUS at 20:45

## 2023-02-26 RX ADMIN — DIVALPROEX SODIUM 250 MG: 250 TABLET, FILM COATED, EXTENDED RELEASE ORAL at 07:55

## 2023-02-26 RX ADMIN — LEVOTHYROXINE SODIUM 50 MCG: 0.05 TABLET ORAL at 06:19

## 2023-02-26 RX ADMIN — RIVAROXABAN 20 MG: 20 TABLET, FILM COATED ORAL at 07:56

## 2023-02-26 RX ADMIN — GUAIFENESIN 600 MG: 600 TABLET, EXTENDED RELEASE ORAL at 07:57

## 2023-02-26 RX ADMIN — PANTOPRAZOLE SODIUM 40 MG: 40 TABLET, DELAYED RELEASE ORAL at 07:54

## 2023-02-26 RX ADMIN — METOPROLOL SUCCINATE 100 MG: 100 TABLET, EXTENDED RELEASE ORAL at 20:49

## 2023-02-26 RX ADMIN — MAGNESIUM SULFATE HEPTAHYDRATE 2000 MG: 40 INJECTION, SOLUTION INTRAVENOUS at 10:51

## 2023-02-26 RX ADMIN — NORTRIPTYLINE HYDROCHLORIDE 10 MG: 10 CAPSULE ORAL at 20:49

## 2023-02-26 RX ADMIN — CLONAZEPAM 1 MG: 1 TABLET ORAL at 22:55

## 2023-02-26 RX ADMIN — BENZONATATE 200 MG: 100 CAPSULE ORAL at 14:17

## 2023-02-26 RX ADMIN — GUAIFENESIN 600 MG: 600 TABLET, EXTENDED RELEASE ORAL at 20:49

## 2023-02-26 RX ADMIN — Medication 4.8 MG: at 22:54

## 2023-02-26 RX ADMIN — SODIUM CHLORIDE 500 ML: 9 INJECTION, SOLUTION INTRAVENOUS at 00:01

## 2023-02-26 RX ADMIN — BENZONATATE 200 MG: 100 CAPSULE ORAL at 22:53

## 2023-02-26 NOTE — PLAN OF CARE
Problem: Discharge Planning  Goal: Discharge to home or other facility with appropriate resources  2/26/2023 0027 by Maulik oRss RN  Outcome: Progressing  2/25/2023 1830 by Aviva Quinn RN  Outcome: Progressing  Flowsheets  Taken 2/25/2023 1657  Discharge to home or other facility with appropriate resources: Identify barriers to discharge with patient and caregiver  Taken 2/25/2023 1257  Discharge to home or other facility with appropriate resources: Identify barriers to discharge with patient and caregiver  Taken 2/25/2023 0857  Discharge to home or other facility with appropriate resources: Identify barriers to discharge with patient and caregiver     Problem: Safety - Adult  Goal: Free from fall injury  2/26/2023 0027 by Maulik Ross RN  Outcome: Progressing  Note: Call light within reach, bed alarm on, socks on patient and sitter at the bedside  2/25/2023 1830 by Aviva Quinn RN  Outcome: Progressing  Flowsheets (Taken 2/25/2023 0715)  Free From Fall Injury: Instruct family/caregiver on patient safety     Problem: ABCDS Injury Assessment  Goal: Absence of physical injury  2/26/2023 0027 by Maulik Ross RN  Outcome: Progressing  2/25/2023 1830 by Aviva Quinn RN  Outcome: Progressing     Problem: Respiratory - Adult  Goal: Achieves optimal ventilation and oxygenation  2/26/2023 0027 by Maulik Ross RN  Outcome: Progressing  2/25/2023 1830 by Aviva Quinn RN  Outcome: Progressing  Flowsheets  Taken 2/25/2023 1657  Achieves optimal ventilation and oxygenation: Assess for changes in respiratory status  Taken 2/25/2023 1257  Achieves optimal ventilation and oxygenation: Assess for changes in respiratory status  Taken 2/25/2023 0857  Achieves optimal ventilation and oxygenation:   Assess for changes in respiratory status   Assess for changes in mentation and behavior   Position to facilitate oxygenation and minimize respiratory effort   Oxygen supplementation based on oxygen saturation or arterial blood gases     Problem: Cardiovascular - Adult  Goal: Maintains optimal cardiac output and hemodynamic stability  2/26/2023 0027 by Keron Westbrook RN  Outcome: Progressing  Note: Patient is on telemetry and getting scheduled vitals  2/25/2023 1830 by Franko Stephenson RN  Outcome: Progressing  Flowsheets  Taken 2/25/2023 1657  Maintains optimal cardiac output and hemodynamic stability: Monitor blood pressure and heart rate  Taken 2/25/2023 1257  Maintains optimal cardiac output and hemodynamic stability: Monitor blood pressure and heart rate  Taken 2/25/2023 0857  Maintains optimal cardiac output and hemodynamic stability: Monitor blood pressure and heart rate  Goal: Absence of cardiac dysrhythmias or at baseline  2/26/2023 0027 by Keron Westbrook RN  Outcome: Progressing  2/25/2023 1830 by Franko Stephenson RN  Outcome: Progressing  Flowsheets  Taken 2/25/2023 1657  Absence of cardiac dysrhythmias or at baseline: Monitor cardiac rate and rhythm  Taken 2/25/2023 1257  Absence of cardiac dysrhythmias or at baseline: Monitor cardiac rate and rhythm  Taken 2/25/2023 0857  Absence of cardiac dysrhythmias or at baseline: Monitor cardiac rate and rhythm     Problem: Skin/Tissue Integrity - Adult  Goal: Skin integrity remains intact  2/26/2023 0027 by Keron Westbrook RN  Outcome: Progressing  Note: Patient is able to turn self in bed and ambulate in room to prevent skin breakdown  2/25/2023 1830 by Franko Stephenson RN  Outcome: Progressing  Flowsheets  Taken 2/25/2023 1657  Skin Integrity Remains Intact: Monitor for areas of redness and/or skin breakdown  Taken 2/25/2023 1257  Skin Integrity Remains Intact: Monitor for areas of redness and/or skin breakdown  Taken 2/25/2023 0857  Skin Integrity Remains Intact: Monitor for areas of redness and/or skin breakdown  Taken 2/25/2023 0715  Skin Integrity Remains Intact: Monitor for areas of redness and/or skin breakdown     Problem: Musculoskeletal - Adult  Goal: Return mobility to safest level of function  2/26/2023 0027 by Sveta Arauz RN  Outcome: Progressing  Note: Patient is working with PT/OT to get strong enough to discharge   2/25/2023 1830 by Ricco Taveras RN  Outcome: Progressing  Flowsheets  Taken 2/25/2023 1657  Return Mobility to Safest Level of Function: Assess patient stability and activity tolerance for standing, transferring and ambulating with or without assistive devices  Taken 2/25/2023 1257  Return Mobility to Safest Level of Function: Assess patient stability and activity tolerance for standing, transferring and ambulating with or without assistive devices  Taken 2/25/2023 0857  Return Mobility to Safest Level of Function: Assess patient stability and activity tolerance for standing, transferring and ambulating with or without assistive devices     Problem: Gastrointestinal - Adult  Goal: Maintains adequate nutritional intake  2/26/2023 0027 by Sveta Arauz RN  Outcome: Progressing  Note: Patient is eating 50-75% of their meal  2/25/2023 1830 by Ricco Taveras RN  Outcome: Progressing  Flowsheets  Taken 2/25/2023 1657  Maintains adequate nutritional intake: Monitor percentage of each meal consumed  Taken 2/25/2023 1257  Maintains adequate nutritional intake: Monitor percentage of each meal consumed  Taken 2/25/2023 0857  Maintains adequate nutritional intake: Monitor percentage of each meal consumed     Problem: Infection - Adult  Goal: Absence of infection at discharge  2/26/2023 0027 by Sveta Arauz RN  Outcome: Progressing  Note: Medications are being used to prevent and treat infection   2/25/2023 1830 by Ricco Taveras RN  Outcome: Progressing  Flowsheets  Taken 2/25/2023 1657  Absence of infection at discharge: Assess and monitor for signs and symptoms of infection  Taken 2/25/2023 1257  Absence of infection at discharge: Assess and monitor for signs and symptoms of infection  Taken 2/25/2023 0857  Absence of infection at discharge: Assess and monitor for signs and symptoms of infection  Goal: Absence of infection during hospitalization  2/26/2023 0027 by Donte Coy RN  Outcome: Progressing  2/25/2023 1830 by Yogesh Willis RN  Outcome: Progressing  Flowsheets  Taken 2/25/2023 1657  Absence of infection during hospitalization: Assess and monitor for signs and symptoms of infection  Taken 2/25/2023 1257  Absence of infection during hospitalization: Assess and monitor for signs and symptoms of infection  Taken 2/25/2023 0857  Absence of infection during hospitalization: Assess and monitor for signs and symptoms of infection     Problem: Chronic Conditions and Co-morbidities  Goal: Patient's chronic conditions and co-morbidity symptoms are monitored and maintained or improved  2/26/2023 0027 by Donte Coy RN  Outcome: Progressing  2/25/2023 1830 by Yogesh Willis RN  Outcome: Progressing  Flowsheets  Taken 2/25/2023 1657  Care Plan - Patient's Chronic Conditions and Co-Morbidity Symptoms are Monitored and Maintained or Improved: Monitor and assess patient's chronic conditions and comorbid symptoms for stability, deterioration, or improvement  Taken 2/25/2023 1257  Care Plan - Patient's Chronic Conditions and Co-Morbidity Symptoms are Monitored and Maintained or Improved: Monitor and assess patient's chronic conditions and comorbid symptoms for stability, deterioration, or improvement  Taken 2/25/2023 651 Miriam Calvo - Patient's Chronic Conditions and Co-Morbidity Symptoms are Monitored and Maintained or Improved: Monitor and assess patient's chronic conditions and comorbid symptoms for stability, deterioration, or improvement     Problem: Pain  Goal: Verbalizes/displays adequate comfort level or baseline comfort level  2/26/2023 0027 by Donte Coy RN  Outcome: Progressing  Note: Pain is being managed with rest, repositioning and medication   2/25/2023 1830 by Yogesh Willis RN  Outcome: Progressing  Flowsheets  Taken 2/25/2023 1115  Verbalizes/displays adequate comfort level or baseline comfort level: Encourage patient to monitor pain and request assistance  Taken 2/25/2023 0715  Verbalizes/displays adequate comfort level or baseline comfort level: Encourage patient to monitor pain and request assistance

## 2023-02-26 NOTE — PROGRESS NOTES
Hospitalist Progress Note    Patient:  Danette Stevens      Unit/Bed:6K-26/026-A    YOB: 1950    MRN: [id number]       Acct: [de-identified]     PCP: Lin Lucas    Date of Admission: 2/23/2023    Assessment/Plan:    Sepsis (resolved)  Supported by 4/4 SIRS criteria (febrile, elevated WBC, tachypnea, tachycardia) + source (COVID-19). Received IV fluid bolus. Received ceftriaxone x2 doses for suspected UTI (now ruled out). Blood Cx x2. C/w IV fluid maintenance. Feeling improved. COVID-19  Positive PCR 02/23. CXR without evidence of acute process. No hypoxia. No indication for Decadron. Contact plus isolation. Pt does not have signs and symptoms of moderate to severe COVID PNA. Evidence does not support any available treatments at this time. Will continue w/ supportive management and monitor. Tmax 10. 2.5 overnight. No other identifiable localized source of infection. Continue supportive care and monitor. Blood cultures w/ T max greater than 101. Atrial fibrillation with RVR (resolved)  Hx of PAF. OP regimen: metoprolol and flecainide. [id number] 4. DOAC resumed. Short episode of A-fib RVR (130-140) despite resuming oral home mede likely secondary to sepsis/COVID-19 infection for which started on Diltiazem Infusion w/ adequate response. D/c'ed IV CCB. Continue w/ home meds and monitor. Maintain telemetry    2/26: noted HR suboptimally controlled (even while afebrile); also, noted Mg 1. 3. will replace and trend. Also increased BB to 100 from 50 BID. Will monitor. HypoMg: Mg 1.3. Likely nutritional; will replace and trend    Hx of HTN  BP initially soft likely 2/2 dehydration/inadequate PO intake. fairly well-controlled now. Metoprolol resumed with hold parameters; home ACEi on hold. CCM for now and monitor. Consider resuming ACE at discharge (likely for nephropathy protection given DM)    Hx of SSS  S/p PPM    G1DD chronic w/o exacerbation; clinically euvolemic.  I/O, daily wt, salt (2 gr) and fluid (2L) restriction      Type 2 Diabetes Mellitus, complicated by peripheral neuropathy  Last hemoglobin A1c was 8.7 on 01/2023. On OHAs at home which held on admission. Medium dose corrective algorithm initiated. Hypoglycemic protocol. BS fairly controlled. CCM and trend    Hx of Hypothyroidism  Home levothyroxine resumed; last TSH from 12/31/22 WNLs     Hx of HLD  Noted in history    Hx of GERD  PPI resumed    Hx of Anxiety   Nortriptyline resumed     Fibromyalgia   Noted    PT/OT/SW/CM to follow    Expected discharge date:  TBD    Disposition:    [] Home       [] TCU       [] Rehab       [] Psych       [] SNF       [] [facility]       [x] Other- TBD    Chief Complaint: Headache    Hospital Course:   19-year-old female with history of HTN, PAF, s/p PPM, DM2, hypothyroidism, HLD, anxiety and fibromyalgia who presented to [facility] for evaluation of a headache. The patient states her symptoms started 2 days ago. It is associated with fevers, diarrhea, confusion and fatigue/weakness. She denies any nausea, vomiting, chest pain, shortness of breath, abdominal or urinary complaints. Patient reports yesterday evening while using the restroom she bent over to reach something and immediately lost consciousness, hitting her head. She woke up on the floor but was too weak to get up, laying there for 6 hours. She eventually found strength of her chart to PCP advised her to come in for evaluation. Upon arrival, vitals included Tmax 101.6, RR 20, , /54, SPO2 95% on RA. Labs sent for CL 96, CO2 22, AG 18, glucose 180, WBC 14.5. Troponin WNL. COVID-19 positive. CXR with no acute process. CT head and cervical spine with no acute abnormalities. Patient was given 1 L normal saline bolus. Hospitalist was contacted for admission. Please see A&P for further information. Subjective (past 24 hours):   Patient feeling much improved.  Appetite improved w/ adequate PO intake. Pt inquiring when can be discharged. Denies CP/SOB/fever/chills/cough/sputum/presyncope/palpitation/GI or  symptoms. ROS (12 point review of systems completed. Pertinent positives noted. Otherwise ROS is negative). Medications:  Reviewed    Infusion Medications    dextrose      sodium chloride 25 mL/hr at 02/24/23 1231     Scheduled Medications    metoprolol succinate  100 mg Oral BID    magnesium sulfate  2,000 mg IntraVENous Once    guaiFENesin  600 mg Oral BID    divalproex  250 mg Oral Daily    nortriptyline  10 mg Oral Nightly    calcium replacement protocol   Other RX Placeholder    flecainide  50 mg Oral BID    levothyroxine  50 mcg Oral Daily    [Held by provider] gabapentin  600 mg Oral 4x Daily    pantoprazole  40 mg Oral QAM AC    rivaroxaban  20 mg Oral Daily with breakfast    insulin lispro  0-8 Units SubCUTAneous TID WC    insulin lispro  0-4 Units SubCUTAneous Nightly    sodium chloride flush  5-40 mL IntraVENous 2 times per day     PRN Meds: acetaminophen **OR** acetaminophen, benzonatate, Menthol, ibuprofen, meclizine, clonazePAM, glucose, dextrose bolus **OR** dextrose bolus, glucagon (rDNA), dextrose, sodium chloride flush, sodium chloride, ondansetron **OR** ondansetron, polyethylene glycol      Intake/Output Summary (Last 24 hours) at 2/26/2023 1729  Last data filed at 2/26/2023 0021  Gross per 24 hour   Intake 810 ml   Output 700 ml   Net 110 ml         Diet:  ADULT DIET; Regular    Exam:  /76   Pulse (!) 109   Temp 97.5 °F (36.4 °C) (Oral)   Resp 18   Ht 5' 4\" (1.626 m)   Wt 148 lb 5.9 oz (67.3 kg)   SpO2 94%    L/min   BMI 25.47 kg/m²     General appearance: Looking tired and ill but not toxic. No apparent distress, appears stated age and cooperative. Acutely ill-appearing. HEENT: Pupils equal, round, and reactive to light. Conjunctivae/corneas clear. Neck: Supple, with full range of motion. No jugular venous distention. Trachea midline.   Respiratory: Normal respiratory effort. Clear to auscultation, bilaterally without Rales/Wheezes/Rhonchi. Cardiovascular: Regular rate and rhythm with normal S1/S2 without murmurs, rubs or gallops. Abdomen: Soft, non-tender, non-distended with normal bowel sounds. Musculoskeletal: passive and active ROM x 4 extremities. Skin: Skin color, texture, turgor normal.  No rashes or lesions. Neurologic:  Neurovascularly intact without any focal sensory/motor deficits. Cranial nerves: II-XII intact, grossly non-focal.  Psychiatric: Alert and oriented, thought content appropriate, normal insight  Capillary Refill: Brisk,< 3 seconds   Peripheral Pulses: +2 palpable, equal bilaterally       Labs:   Recent Labs     02/25/23  0855 02/25/23  1654 02/26/23  0641   WBC 9.6 10.7 8.9   HGB 7.6* 10.2* 10.5*   HCT 24.1* 32.3* 32.6*    209 189       Recent Labs     02/24/23  0534 02/25/23  0855 02/26/23  0641    142 141   K 4.3 3.2* 3.9    114* 109   CO2 20* 18* 19*   BUN 15 8 8   CREATININE 0.9 0.7 0.7   CALCIUM 8.0* 6.8* 8.2*   PHOS 3.2  --   --        No results for input(s): AST, ALT, BILIDIR, BILITOT, ALKPHOS in the last 72 hours. No results for input(s): INR in the last 72 hours. Recent Labs     02/24/23  0534   CKTOTAL 1,520*         Microbiology:      Urinalysis:      Lab Results   Component Value Date/Time    NITRU NEGATIVE 02/23/2023 09:10 PM    WBCUA 50-75 02/23/2023 09:10 PM    BACTERIA NONE SEEN 02/23/2023 09:10 PM    RBCUA 3-5 02/23/2023 09:10 PM    BLOODU NEGATIVE 02/23/2023 09:10 PM    SPECGRAV 1.030 12/30/2020 11:28 AM    SPECGRAV >=1.030 11/04/2015 09:55 AM    GLUCOSEU NEGATIVE 02/23/2023 09:10 PM       Radiology:  CT HEAD WO CONTRAST   Final Result    No evidence of acute intracranial abnormality. **This report has been created using voice recognition software. It may contain minor errors which are inherent in voice recognition technology. **      Final report electronically signed by  Rosa Shore DO, MD on 2/23/2023 6:56 PM      CT CERVICAL SPINE WO CONTRAST   Final Result    No evidence of acute osseous injury of the cervical spine. **This report has been created using voice recognition software. It may contain minor errors which are inherent in voice recognition technology. **      Final report electronically signed by Dr. Get Mejia MD on 2/23/2023 6:57 PM      XR CHEST (2 VW)   Final Result   Stable radiographic appearance of the chest. No evidence of an acute process. **This report has been created using voice recognition software. It may contain minor errors which are inherent in voice recognition technology. **      Final report electronically signed by Dr. Get Mejia MD on 2/23/2023 6:44 PM          DVT prophylaxis: [] Lovenox                                 [] SCDs                                 [] SQ Heparin                                 [] Encourage ambulation           [x] Already on Anticoagulation-Xarelto     Code Status: Full Code    PT/OT Eval Status: Yes    Tele:   [x] yes             [] no  With RN in room, patient and daughter-in-law were updated about and agreed upon the treatment plan, all the questions and concerns were addressed.     Electronically signed by Kylah Montoya MD on 2/26/2023 at 9:39 AM

## 2023-02-26 NOTE — PROGRESS NOTES
At the start of the shift the writer took the patients vitals and the patients oral temperature was 101 F. The writer than gave oral tylenol to the patient and rechecked her oral temperature 1 hour later and it came down to 99 F. Shortly after the recheck, the patient become tachycardic in the 120's-130's and the writer contacted the PA on call Yamel King for orders. He asked the writer to get another blood pressure and rectal temperature. The patient's blood pressure was 114/69, heart rate 119 and rectal temperature of 102.5 F. The writer let the PA know these results. The PA ordered oral tylenol and ibuprofen to be given to the patient to help bring down the patient's temperature. The PA also said to recheck the patient's rectal temperature in 2 hours. The tech/sitter came out of the patients room to inform the nurse that the patient had called her family and stated \"Her heart rate was 160 and temperature was 130, they stuck something in her butt and they only gave her oral medications and they are not doing anything for me\".      2330- Rectal temperature 100.3 F  See new orders from PA

## 2023-02-26 NOTE — PLAN OF CARE
Problem: Discharge Planning  Goal: Discharge to home or other facility with appropriate resources  2/26/2023 0815 by Gonzalo Sampson RN  Outcome: Progressing  Flowsheets (Taken 2/26/2023 1359)  Discharge to home or other facility with appropriate resources:   Identify barriers to discharge with patient and caregiver   Arrange for needed discharge resources and transportation as appropriate   Identify discharge learning needs (meds, wound care, etc)   Refer to discharge planning if patient needs post-hospital services based on physician order or complex needs related to functional status, cognitive ability or social support system  2/26/2023 0027 by Ирина Vicente RN  Outcome: Progressing  2/25/2023 1830 by Andrey Juárez RN  Outcome: Progressing  Flowsheets  Taken 2/25/2023 1657  Discharge to home or other facility with appropriate resources: Identify barriers to discharge with patient and caregiver  Taken 2/25/2023 1257  Discharge to home or other facility with appropriate resources: Identify barriers to discharge with patient and caregiver  Taken 2/25/2023 0857  Discharge to home or other facility with appropriate resources: Identify barriers to discharge with patient and caregiver     Problem: Safety - Adult  Goal: Free from fall injury  2/26/2023 0815 by Gonzalo Sampson RN  Outcome: Progressing  2/26/2023 0027 by Ирина Vicente RN  Outcome: Progressing  Note: Call light within reach, bed alarm on, socks on patient and sitter at the bedside  2/25/2023 1830 by Andrey Juárez RN  Outcome: Progressing  Flowsheets (Taken 2/25/2023 0715)  Free From Fall Injury: Instruct family/caregiver on patient safety     Problem: ABCDS Injury Assessment  Goal: Absence of physical injury  2/26/2023 0815 by Gonzalo Sampson RN  Outcome: Progressing  2/26/2023 0027 by Ирина Vicente RN  Outcome: Progressing  2/25/2023 1830 by Andrey Juárez RN  Outcome: Progressing

## 2023-02-27 VITALS
HEART RATE: 72 BPM | TEMPERATURE: 97.7 F | RESPIRATION RATE: 18 BRPM | HEIGHT: 64 IN | DIASTOLIC BLOOD PRESSURE: 71 MMHG | SYSTOLIC BLOOD PRESSURE: 130 MMHG | WEIGHT: 137.13 LBS | OXYGEN SATURATION: 98 % | BODY MASS INDEX: 23.41 KG/M2

## 2023-02-27 LAB
ANION GAP SERPL CALC-SCNC: 9 MEQ/L (ref 8–16)
BASOPHILS ABSOLUTE: 0 THOU/MM3 (ref 0–0.1)
BASOPHILS NFR BLD AUTO: 0.1 %
BUN SERPL-MCNC: 8 MG/DL (ref 7–22)
CALCIUM SERPL-MCNC: 8.4 MG/DL (ref 8.5–10.5)
CHLORIDE SERPL-SCNC: 107 MEQ/L (ref 98–111)
CO2 SERPL-SCNC: 24 MEQ/L (ref 23–33)
CREAT SERPL-MCNC: 0.8 MG/DL (ref 0.4–1.2)
DEPRECATED RDW RBC AUTO: 46.3 FL (ref 35–45)
EKG ATRIAL RATE: 120 BPM
EKG P AXIS: 42 DEGREES
EKG P-R INTERVAL: 190 MS
EKG Q-T INTERVAL: 302 MS
EKG Q-T INTERVAL: 352 MS
EKG QRS DURATION: 66 MS
EKG QRS DURATION: 72 MS
EKG QTC CALCULATION (BAZETT): 426 MS
EKG QTC CALCULATION (BAZETT): 499 MS
EKG R AXIS: 38 DEGREES
EKG R AXIS: 46 DEGREES
EKG T AXIS: 74 DEGREES
EKG T AXIS: 87 DEGREES
EKG VENTRICULAR RATE: 120 BPM
EKG VENTRICULAR RATE: 121 BPM
EOSINOPHIL NFR BLD AUTO: 2 %
EOSINOPHILS ABSOLUTE: 0.1 THOU/MM3 (ref 0–0.4)
ERYTHROCYTE [DISTWIDTH] IN BLOOD BY AUTOMATED COUNT: 14.6 % (ref 11.5–14.5)
GFR SERPL CREATININE-BSD FRML MDRD: > 60 ML/MIN/1.73M2
GLUCOSE BLD STRIP.AUTO-MCNC: 177 MG/DL (ref 70–108)
GLUCOSE BLD STRIP.AUTO-MCNC: 190 MG/DL (ref 70–108)
GLUCOSE BLD STRIP.AUTO-MCNC: 238 MG/DL (ref 70–108)
GLUCOSE SERPL-MCNC: 193 MG/DL (ref 70–108)
HCT VFR BLD AUTO: 30.7 % (ref 37–47)
HGB BLD-MCNC: 9.6 GM/DL (ref 12–16)
IMM GRANULOCYTES # BLD AUTO: 0.04 THOU/MM3 (ref 0–0.07)
IMM GRANULOCYTES NFR BLD AUTO: 0.5 %
LYMPHOCYTES ABSOLUTE: 2.1 THOU/MM3 (ref 1–4.8)
LYMPHOCYTES NFR BLD AUTO: 28.2 %
MAGNESIUM SERPL-MCNC: 1.7 MG/DL (ref 1.6–2.4)
MCH RBC QN AUTO: 27.1 PG (ref 26–33)
MCHC RBC AUTO-ENTMCNC: 31.3 GM/DL (ref 32.2–35.5)
MCV RBC AUTO: 86.7 FL (ref 81–99)
MONOCYTES ABSOLUTE: 0.6 THOU/MM3 (ref 0.4–1.3)
MONOCYTES NFR BLD AUTO: 8.2 %
NEUTROPHILS NFR BLD AUTO: 61 %
NRBC BLD AUTO-RTO: 0 /100 WBC
PHOSPHATE SERPL-MCNC: 2.6 MG/DL (ref 2.4–4.7)
PLATELET # BLD AUTO: 232 THOU/MM3 (ref 130–400)
PMV BLD AUTO: 9.4 FL (ref 9.4–12.4)
POTASSIUM SERPL-SCNC: 4.4 MEQ/L (ref 3.5–5.2)
RBC # BLD AUTO: 3.54 MILL/MM3 (ref 4.2–5.4)
SEGMENTED NEUTROPHILS ABSOLUTE COUNT: 4.5 THOU/MM3 (ref 1.8–7.7)
SODIUM SERPL-SCNC: 140 MEQ/L (ref 135–145)
WBC # BLD AUTO: 7.4 THOU/MM3 (ref 4.8–10.8)

## 2023-02-27 PROCEDURE — 6370000000 HC RX 637 (ALT 250 FOR IP): Performed by: STUDENT IN AN ORGANIZED HEALTH CARE EDUCATION/TRAINING PROGRAM

## 2023-02-27 PROCEDURE — G0378 HOSPITAL OBSERVATION PER HR: HCPCS

## 2023-02-27 PROCEDURE — 6360000002 HC RX W HCPCS: Performed by: STUDENT IN AN ORGANIZED HEALTH CARE EDUCATION/TRAINING PROGRAM

## 2023-02-27 PROCEDURE — 83735 ASSAY OF MAGNESIUM: CPT

## 2023-02-27 PROCEDURE — 6370000000 HC RX 637 (ALT 250 FOR IP): Performed by: PHYSICIAN ASSISTANT

## 2023-02-27 PROCEDURE — 80048 BASIC METABOLIC PNL TOTAL CA: CPT

## 2023-02-27 PROCEDURE — 6370000000 HC RX 637 (ALT 250 FOR IP): Performed by: HOSPITALIST

## 2023-02-27 PROCEDURE — 97530 THERAPEUTIC ACTIVITIES: CPT

## 2023-02-27 PROCEDURE — 82948 REAGENT STRIP/BLOOD GLUCOSE: CPT

## 2023-02-27 PROCEDURE — 85025 COMPLETE CBC W/AUTO DIFF WBC: CPT

## 2023-02-27 PROCEDURE — 97535 SELF CARE MNGMENT TRAINING: CPT

## 2023-02-27 PROCEDURE — 84100 ASSAY OF PHOSPHORUS: CPT

## 2023-02-27 PROCEDURE — 97110 THERAPEUTIC EXERCISES: CPT

## 2023-02-27 PROCEDURE — 97162 PT EVAL MOD COMPLEX 30 MIN: CPT

## 2023-02-27 PROCEDURE — 99239 HOSP IP/OBS DSCHRG MGMT >30: CPT | Performed by: HOSPITALIST

## 2023-02-27 PROCEDURE — 2580000003 HC RX 258: Performed by: STUDENT IN AN ORGANIZED HEALTH CARE EDUCATION/TRAINING PROGRAM

## 2023-02-27 PROCEDURE — 97166 OT EVAL MOD COMPLEX 45 MIN: CPT

## 2023-02-27 PROCEDURE — 96367 TX/PROPH/DG ADDL SEQ IV INF: CPT

## 2023-02-27 PROCEDURE — 36415 COLL VENOUS BLD VENIPUNCTURE: CPT

## 2023-02-27 RX ORDER — LISINOPRIL 20 MG/1
20 TABLET ORAL DAILY
Qty: 30 TABLET | Refills: 3 | Status: SHIPPED | OUTPATIENT
Start: 2023-03-01

## 2023-02-27 RX ORDER — METOPROLOL SUCCINATE 50 MG/1
100 TABLET, EXTENDED RELEASE ORAL 2 TIMES DAILY
Qty: 30 TABLET | Refills: 3 | Status: SHIPPED | OUTPATIENT
Start: 2023-02-27

## 2023-02-27 RX ORDER — MAGNESIUM SULFATE 1 G/100ML
1000 INJECTION INTRAVENOUS ONCE
Status: COMPLETED | OUTPATIENT
Start: 2023-02-27 | End: 2023-02-27

## 2023-02-27 RX ADMIN — PANTOPRAZOLE SODIUM 40 MG: 40 TABLET, DELAYED RELEASE ORAL at 05:50

## 2023-02-27 RX ADMIN — Medication 4.8 MG: at 09:29

## 2023-02-27 RX ADMIN — CLONAZEPAM 1 MG: 1 TABLET ORAL at 09:29

## 2023-02-27 RX ADMIN — DIVALPROEX SODIUM 250 MG: 250 TABLET, FILM COATED, EXTENDED RELEASE ORAL at 09:29

## 2023-02-27 RX ADMIN — MAGNESIUM SULFATE HEPTAHYDRATE 1000 MG: 1 INJECTION, SOLUTION INTRAVENOUS at 14:43

## 2023-02-27 RX ADMIN — BENZONATATE 200 MG: 100 CAPSULE ORAL at 09:29

## 2023-02-27 RX ADMIN — LEVOTHYROXINE SODIUM 50 MCG: 0.05 TABLET ORAL at 05:50

## 2023-02-27 RX ADMIN — SODIUM CHLORIDE, PRESERVATIVE FREE 10 ML: 5 INJECTION INTRAVENOUS at 09:29

## 2023-02-27 RX ADMIN — METOPROLOL SUCCINATE 100 MG: 100 TABLET, EXTENDED RELEASE ORAL at 09:32

## 2023-02-27 RX ADMIN — Medication 4.8 MG: at 01:28

## 2023-02-27 RX ADMIN — FLECAINIDE ACETATE 50 MG: 50 TABLET ORAL at 09:32

## 2023-02-27 RX ADMIN — GUAIFENESIN 600 MG: 600 TABLET, EXTENDED RELEASE ORAL at 09:29

## 2023-02-27 RX ADMIN — INSULIN LISPRO 2 UNITS: 100 INJECTION, SOLUTION INTRAVENOUS; SUBCUTANEOUS at 12:29

## 2023-02-27 RX ADMIN — RIVAROXABAN 20 MG: 20 TABLET, FILM COATED ORAL at 09:29

## 2023-02-27 ASSESSMENT — PAIN DESCRIPTION - LOCATION: LOCATION: THROAT

## 2023-02-27 ASSESSMENT — PAIN SCALES - GENERAL: PAINLEVEL_OUTOF10: 4

## 2023-02-27 ASSESSMENT — PAIN SCALES - WONG BAKER: WONGBAKER_NUMERICALRESPONSE: 0

## 2023-02-27 ASSESSMENT — PAIN - FUNCTIONAL ASSESSMENT: PAIN_FUNCTIONAL_ASSESSMENT: ACTIVITIES ARE NOT PREVENTED

## 2023-02-27 ASSESSMENT — PAIN DESCRIPTION - ORIENTATION: ORIENTATION: MID;LOWER

## 2023-02-27 ASSESSMENT — PAIN DESCRIPTION - DESCRIPTORS: DESCRIPTORS: ACHING

## 2023-02-27 NOTE — PROGRESS NOTES
[address] [place] [address]  INPATIENT PHYSICAL THERAPY  EVALUATION  [facility] RENAL TELEMETRY 6K - 5P-34/725-E    Time In: 1722  Time Out: 1300  Timed Code Treatment Minutes: 25 Minutes  Minutes: 33          Date: 2023  Patient Name: Lora Isaac,  Gender:  female        MRN: [id number]  : 1950  (67 y.o.)      Referring Practitioner: Nav St MD  Diagnosis: COVID  Additional Pertinent Hx: 67 y.o. female with PMH pAfib, SSS s/p PPM, HTN, HLD, DMII, hypothyroidism, anxiety and fibromyalgia who presented to [address] [place] [address] with headache. The patient states her symptoms started 2 days ago. This is associated with fevers, diarrhea, confusion and fatigue/weakness. She denies any nausea, vomiting, chest pain, shortness of breath, abdominal or urinary complaints. The patient reports yesterday evening while using the restroom she bent over to reach something and immediately lost consciousness, hitting her head. She woke up on the floor but was too weak to get up, laying there for 6 hours. She eventually found strength and reached out to PCP who advised her to come in for evaluation. Restrictions/Precautions:  Restrictions/Precautions: Fall Risk, General Precautions, Isolation    Subjective:     Subjective: Pt resting in bed and agrees to therapy. RN approved session.     General:     Vision: Impaired  Vision Exceptions: Wears glasses at all times  Hearing: Within functional limits       Pain: denies      Vitals: Vitals not assessed per clinical judgement, see nursing flowsheet    Social/Functional History:    Lives With: Alone  Type of Home: House  Home Layout: One level  Home Access: Stairs to enter without rails  Entrance Stairs - Number of Steps: 1 or 2  Home Equipment: Donnice Lightning, 4 wheeled, [employer]             ADL Assistance: Independent  Homemaking Assistance: Independent  Ambulation Assistance: Independent  Transfer Assistance: Independent    Active : Yes          OBJECTIVE:  Range of Motion:  Bilateral Lower Extremity: WFL    Strength:  Bilateral Lower Extremity: grossly 4-/5    Balance:  Static Sitting Balance:  Supervision  Dynamic Sitting Balance: Stand By Assistance  Static Standing Balance: Stand By Assistance  Dynamic Standing Balance: Stand By Assistance    Bed Mobility:  Supine to Sit: Stand By Assistance, with head of bed raised, with rail, with increased time for completion    Transfers:  Sit to Stand: Stand By Assistance  Stand to Sit:Stand By Assistance    Ambulation:  Stand By Assistance  Distance: 45 ft around room with multiple turns  Surface: Level Tile  Device:Rolling Walker  Gait Deviations:  Decreased Step Length Bilaterally, Decreased Gait Speed, and Decreased Heel Strike Bilaterally    Exercise:  Patient was guided in 1 set(s) 10 reps of exercise to both lower extremities. Ankle pumps, Hip abduction/adduction, Seated marches, Seated heel/toe raises, and Long arc quads. Exercises were completed for increased independence with functional mobility. Functional Outcome Measures: Completed  AM-PAC Inpatient Mobility Raw Score : 18  AM-PAC Inpatient T-Scale Score : 43.63    ASSESSMENT:  Activity Tolerance:  Patient tolerance of  treatment: good. Treatment Initiated: Treatment and education initiated within context of evaluation. Evaluation time included review of current medical information, gathering information related to past medical, social and functional history, completion of standardized testing, formal and informal observation of tasks, assessment of data and development of plan of care and goals. Treatment time included skilled education and facilitation of tasks to increase safety and independence with functional mobility for improved independence and quality of life. Assessment:   Body Structures, Functions, Activity Limitations Requiring Skilled Therapeutic Intervention: Decreased functional mobility , Decreased strength, Decreased endurance, Decreased balance  Assessment: Pt is a 68 yo female that is mildly deconditioned and participated well. Pt was generally Modified Ind for mobility in [facility] and is at [facility] to [facility] today. Pt would benefit from continued skilled PT to address strengthening, balance, bed mobility, endurance building, and functional mobility training. Therapy Prognosis: Good    Requires PT Follow-Up: Yes    Discharge Recommendations:  Discharge Recommendations: Continue to assess pending progress, Home with Home health PT, Other (comment) (Alert button system)    Patient Education:      . Patient Education  Education Given To: Patient  Education Provided: Role of Therapy, Plan of Care, Home Exercise Program  Education Method: Demonstration, Verbal  Barriers to Learning: None  Education Outcome: Verbalized understanding, Demonstrated understanding       Equipment Recommendations:  Equipment Needed: No    Plan:  Current Treatment Recommendations: Strengthening, Balance training, Endurance training, Functional mobility training, Gait training, Stair training, Home exercise program, Patient/Caregiver education & training, Safety education & training, Transfer training, Therapeutic activities  General Plan:  (5-6x C)    Goals:  Patient Goals : go home  Short Term Goals  Time Frame for Short Term Goals: at discharge  Short Term Goal 1: Pt to be Mod I for supine <> sit to get in/out of bed  Short Term Goal 2: Pt to be Mod I for sit <> stand to get up to ambulate  Short Term Goal 3: Pt to ambulate >60 ft with RW with Supervision for household distance  Short Term Goal 4: Pt to negotiate 2 steps with Supervision for home access  Long Term Goals  Time Frame for Long Term Goals : not set due to short ELOS    Following session, patient left in safe position with all fall risk precautions in place. Cici Moreira.  Yolanda Chris [address]

## 2023-02-27 NOTE — DISCHARGE SUMMARY
Hospital Medicine Discharge Summary      Patient Identification:   Jewels Scott   : 1950  MRN: [id number]   Account: [de-identified]      Patient's PCP: Elaine Emmanuel    Admit Date: 2023     Discharge Date:   2023    Admitting Physician: No admitting provider for patient encounter. Discharge Physician: Peggy Patricia MD     Discharge Diagnoses:    Sepsis - Resolved  Supported by 4/4 SIRS criteria (febrile, elevated WBC, tachypnea, tachycardia) + source (COVID-19) on arrival.  Received Rocephin x2 doses for suspected UTI (now ruled out). Blood Cx x2 - No Growth. Received IV fluid bolus. COVID-19  Positive PCR . CXR without evidence of acute process. No hypoxia. No indication for Decadron. Symptomatic management. Atrial fibrillation with RVR  Hx of PAF. OP regimen: metoprolol and flecainide. [id number] 4. On Xarelto. Briefly placed on  Diltiazem infusion for Afib RVR. Increased BB to 100 mg twice daily from 50 BID due to HR suboptimally controlled (even while afebrile). Hx of HTN     Hx of SSS    Type 2 Diabetes Mellitus, complicated by peripheral neuropathy    Hx of Hypothyroidism    Hx of HLD     Hx of GERD     Hx of Anxiety      Fibromyalgia       The patient was seen and examined on day of discharge and this discharge summary is in conjunction with any daily progress note from day of discharge. Hospital Course: Jewels Scott is a 67 y.o. female with PMH pAfib, SSS s/p PPM, HTN, HLD, DMII, hypothyroidism, anxiety and fibromyalgia who presented to [address] [place] [address] with headache. The patient states her symptoms started 2 days ago. This is associated with fevers, diarrhea, confusion and fatigue/weakness. She denies any nausea, vomiting, chest pain, shortness of breath, abdominal or urinary complaints.   The patient reports yesterday evening while using the restroom she bent over to reach something and immediately lost consciousness, hitting her head.  She woke up on the floor but was too weak to get up, laying there for 6 hours. She eventually found strength and reached out to PCP who advised her to come in for evaluation. In the ED, vitals Tmax 1-1.6, RR 20, , /54, SPO2 95% RA. Labs significant for CL 96, CO2 22, AG 18, glucose 180, WBC 14.5.  UA ketones 15, protein 30, LE moderate. Lactic acid 2.0. Troponin WNL. EKG Sinus tachycardia, , QTc 426. Influenza A/B negative. COVID-19 positive. CXR with no acute process. CT head and cervical spine with no acute abnormalities. She was given 1 L NS bolus x1. Exam:     Vitals:  Vitals:    02/27/23 0326 02/27/23 0600 02/27/23 0932 02/27/23 1225   BP: 124/73  123/69 130/71   Pulse: 80  75 72   Resp: 19  17 18   Temp: 98.4 °F (36.9 °C)  98.3 °F (36.8 °C) 97.7 °F (36.5 °C)   TempSrc: Oral  Oral Oral   SpO2: 95%  97% 98%   Weight:  137 lb 2 oz (62.2 kg)     Height:       PF:         Weight: Weight: 137 lb 2 oz (62.2 kg)     24 hour intake/output:  Intake/Output Summary (Last 24 hours) at 2/27/2023 1735  Last data filed at 2/27/2023 0032  Gross per 24 hour   Intake 724.78 ml   Output --   Net 724.78 ml         General appearance:  No apparent distress, appears stated age and cooperative. HEENT:  Normal cephalic, atraumatic without obvious deformity. Pupils equal, round, and reactive to light. Extra ocular muscles intact. Conjunctivae/corneas clear. Neck: Supple, with full range of motion. No jugular venous distention. Trachea midline. Respiratory:  Normal respiratory effort. Clear to auscultation, bilaterally without Rales/Wheezes/Rhonchi. Cardiovascular:  Regular rate and rhythm with normal S1/S2 without murmurs, rubs or gallops. Abdomen: Soft, non-tender, non-distended with normal bowel sounds. Musculoskeletal:  No clubbing, cyanosis or edema bilaterally. Full range of motion without deformity. Skin: Skin color, texture, turgor normal.  No rashes or lesions.   Neurologic: Neurovascularly intact without any focal sensory/motor deficits. Cranial nerves: II-XII intact, grossly non-focal.  Psychiatric:  Alert and oriented, thought content appropriate, normal insight  Capillary Refill: Brisk,< 3 seconds   Peripheral Pulses: +2 palpable, equal bilaterally       Labs: For convenience and continuity at follow-up the following most recent labs are provided:      CBC:    Lab Results   Component Value Date/Time    WBC 7.4 02/27/2023 09:06 AM    HGB 9.6 02/27/2023 09:06 AM    HCT 30.7 02/27/2023 09:06 AM     02/27/2023 09:06 AM       Renal:    Lab Results   Component Value Date/Time     02/27/2023 09:06 AM    K 4.4 02/27/2023 09:06 AM    K 3.9 02/26/2023 06:41 AM     02/27/2023 09:06 AM    CO2 24 02/27/2023 09:06 AM    BUN 8 02/27/2023 09:06 AM    CREATININE 0.8 02/27/2023 09:06 AM    CALCIUM 8.4 02/27/2023 09:06 AM    PHOS 2.6 02/27/2023 09:06 AM         Significant Diagnostic Studies    Radiology:   CT HEAD WO CONTRAST   Final Result    No evidence of acute intracranial abnormality. **This report has been created using voice recognition software. It may contain minor errors which are inherent in voice recognition technology. **      Final report electronically signed by Dr. Get Mejia MD on 2/23/2023 6:56 PM      CT CERVICAL SPINE WO CONTRAST   Final Result    No evidence of acute osseous injury of the cervical spine. **This report has been created using voice recognition software. It may contain minor errors which are inherent in voice recognition technology. **      Final report electronically signed by Dr. Get Mejia MD on 2/23/2023 6:57 PM      XR CHEST (2 VW)   Final Result   Stable radiographic appearance of the chest. No evidence of an acute process. **This report has been created using voice recognition software. It may contain minor errors which are inherent in voice recognition technology. **      Final report electronically signed by Dr. Kayce Hardwick MD on 2/23/2023 6:44 PM             Consults:     IP CONSULT TO SOCIAL WORK  IP CONSULT TO HOME CARE NEEDS    Disposition: Home  Condition at Discharge: Stable    Code Status:  Full Code     Patient Instructions:    Discharge lab work: Activity: activity as tolerated  Diet: ADULT DIET; Regular      Follow-up visits:   [facility]  [facility]  [address] [phone]         Discharge Medications:        Medication List        CHANGE how you take these medications      * clonazePAM 2 MG tablet  Commonly known as: Rock Sunitha  What changed: Another medication with the same name was changed. Make sure you understand how and when to take each. * clonazePAM 2 MG tablet  Commonly known as: Rock Sunitha  What changed: Another medication with the same name was changed. Make sure you understand how and when to take each. * clonazePAM 1 MG tablet  Commonly known as: KLONOPIN  Take 1 tablet by mouth 3 times daily as needed for Anxiety for up to 90 days. What changed: additional instructions     lisinopril 20 MG tablet  Commonly known as: PRINIVIL;ZESTRIL  Take 1 tablet by mouth daily  Start taking on: March 1, 2023  What changed: These instructions start on March 1, 2023. If you are unsure what to do until then, ask your doctor or other care provider. metoprolol succinate 50 MG extended release tablet  Commonly known as: TOPROL XL  Take 2 tablets by mouth in the morning and at bedtime  What changed: how much to take           * This list has 3 medication(s) that are the same as other medications prescribed for you. Read the directions carefully, and ask your doctor or other care provider to review them with you.                 CONTINUE taking these medications      divalproex 250 MG extended release tablet  Commonly known as: DEPAKOTE ER     flecainide 50 MG tablet  Commonly known as: TAMBOCOR  Take 1 tablet by mouth 2 times daily gabapentin 600 MG tablet  Commonly known as: NEURONTIN  Take 1 tablet by mouth 4 times daily for 152 days. One tablet in AM, one tablet at lunch time, 2 tablets at bedtime     glimepiride 2 MG tablet  Commonly known as: AMARYL     Janumet  MG per tablet  Generic drug: sitaGLIPtan-metFORMIN  Take 1 tablet by mouth 2 times daily (with meals)     levothyroxine 50 MCG tablet  Commonly known as: SYNTHROID  Take 1 tablet by mouth Daily     magnesium oxide 400 MG tablet  Commonly known as: MAG-OX     meclizine 25 MG Chew  Commonly known as: ANTIVERT     nortriptyline 10 MG capsule  Commonly known as: PAMELOR     pantoprazole 40 MG tablet  Commonly known as: PROTONIX  Take 1 tablet by mouth every morning (before breakfast)     rivaroxaban 20 MG Tabs tablet  Commonly known as: XARELTO  Take 1 tablet by mouth daily (with breakfast)               Where to Get Your Medications        These medications were sent to [address] [place] [address] , [address], [address] [address]      Phone: [phone]   lisinopril 20 MG tablet  metoprolol succinate 50 MG extended release tablet         Time Spent on discharge is more than 30 minutes in the examination, evaluation, counseling and review of medications and discharge plan. Signed: Thank you William Wisdom for the opportunity to be involved in this patient's care.     Electronically signed by Apolinar Watts MD on 2/27/2023 at 5:35 PM

## 2023-02-27 NOTE — CARE COORDINATION
2/27/23, 2:54 PM EST    Patient goals/plan/ treatment preferences discussed by  and . Patient goals/plan/ treatment preferences reviewed with patient/ family. Patient/ family verbalize understanding of discharge plan and are in agreement with goal/plan/treatment preferences. Understanding was demonstrated using the teach back method. AVS provided by RN at time of discharge, which includes all necessary medical information pertaining to the patients current course of illness, treatment, post-discharge goals of care, and treatment preferences. Services At/After Discharge: Home Health, Aide services, Nursing service, OT, and PT       IMM Letter  IMM Letter given to Patient/Family/Significant other/Guardian/POA/by[de-identified] 2/24: CM From home alone. Falls/weakness. Would like . Reluctant to discuss SNF for therapy  Observation Status Letter date given[de-identified] 02/23/23  Observation Status Letter time given[de-identified] 2228  Observation Status Letter given to Patient/Family/Significant other/Guardian/POA/by[de-identified] Staff     SW spoke with pt and MARYLOU Chicas via phone. Discussed discharge POC. Therapy is recommending home health. Pt and Pao Ortez would like [facility]. Referral completed and clinicals faxed. Pts son will be picking pt up at 4:30 pm.  RN is aware.

## 2023-02-27 NOTE — PLAN OF CARE
Problem: Discharge Planning  Goal: Discharge to home or other facility with appropriate resources  2/26/2023 2211 by Elodia Moreno RN  Outcome: Progressing  Flowsheets (Taken 2/26/2023 2045)  Discharge to home or other facility with appropriate resources: Identify barriers to discharge with patient and caregiver  2/26/2023 0815 by Anita Dodge RN  Outcome: Progressing  [address] (Taken 2/26/2023 3091)  Discharge to home or other facility with appropriate resources:   Identify barriers to discharge with patient and caregiver   Arrange for needed discharge resources and transportation as appropriate   Identify discharge learning needs (meds, wound care, etc)   Refer to discharge planning if patient needs post-hospital services based on physician order or complex needs related to functional status, cognitive ability or social support system     Problem: Safety - Adult  Goal: Free from fall injury  2/26/2023 2211 by Elodia Moreno RN  Outcome: Progressing  2/26/2023 0815 by Anita Dodge RN  Outcome: Progressing     Problem: ABCDS Injury Assessment  Goal: Absence of physical injury  2/26/2023 2211 by Elodia Moreno RN  Outcome: Progressing  2/26/2023 0815 by Anita Dodge RN  Outcome: Progressing     Problem: Respiratory - Adult  Goal: Achieves optimal ventilation and oxygenation  Outcome: Progressing  Flowsheets (Taken 2/26/2023 2045)  Achieves optimal ventilation and oxygenation: Assess for changes in respiratory status     Problem: Cardiovascular - Adult  Goal: Maintains optimal cardiac output and hemodynamic stability  Outcome: Progressing  Flowsheets (Taken 2/26/2023 2045)  Maintains optimal cardiac output and hemodynamic stability: Monitor blood pressure and heart rate  Goal: Absence of cardiac dysrhythmias or at baseline  Outcome: Progressing  Flowsheets (Taken 2/26/2023 2045)  Absence of cardiac dysrhythmias or at baseline: Monitor cardiac rate and rhythm     Problem: Skin/Tissue Integrity - Adult  Goal: Skin integrity remains intact  Outcome: Progressing  Flowsheets (Taken 2/26/2023 2045)  Skin Integrity Remains Intact: Monitor for areas of redness and/or skin breakdown     Problem: Musculoskeletal - Adult  Goal: Return mobility to safest level of function  Outcome: Progressing  Flowsheets (Taken 2/26/2023 2045)  Return Mobility to Safest Level of Function: Assess patient stability and activity tolerance for standing, transferring and ambulating with or without assistive devices     Problem: Gastrointestinal - Adult  Goal: Maintains adequate nutritional intake  Outcome: Progressing  Flowsheets (Taken 2/26/2023 2045)  Maintains adequate nutritional intake: Monitor percentage of each meal consumed     Problem: Infection - Adult  Goal: Absence of infection at discharge  Outcome: Progressing  Flowsheets (Taken 2/26/2023 2045)  Absence of infection at discharge: Assess and monitor for signs and symptoms of infection  Goal: Absence of infection during hospitalization  Outcome: Progressing  Flowsheets (Taken 2/26/2023 2045)  Absence of infection during hospitalization: Assess and monitor for signs and symptoms of infection     Problem: Chronic Conditions and Co-morbidities  Goal: Patient's chronic conditions and co-morbidity symptoms are monitored and maintained or improved  Outcome: Progressing  Flowsheets (Taken 2/26/2023 2045)  Care Plan - Patient's Chronic Conditions and Co-Morbidity Symptoms are Monitored and Maintained or Improved: Monitor and assess patient's chronic conditions and comorbid symptoms for stability, deterioration, or improvement     Problem: Pain  Goal: Verbalizes/displays adequate comfort level or baseline comfort level  Outcome: Progressing  Flowsheets (Taken 2/26/2023 2049)  Verbalizes/displays adequate comfort level or baseline comfort level: Encourage patient to monitor pain and request assistance

## 2023-02-27 NOTE — CARE COORDINATION
2/27/23, 10:51 AM EST    DISCHARGE ON [facility] day: 0  Location: [address] [place] [address] Reason for admit: Acute cystitis without hematuria [N30.00]  COVID [U07.1]  COVID-19 [U07.1]   Procedure: 2/23: CXR: Stable radiographic appearance of the chest. No evidence of an acute process  2/23: CT head: No evidence of acute intracranial abnormality  2/23: CT cspine: No evidence of acute osseous injury of the cervical spine  Barriers to Discharge: PT/OT to see, on toprol BID for HR, pt confused at times, on tele  PCP: Meliza Gayle   %  Patient Goals/Plan/Treatment Preferences: Pt from home alone. SW consulted.  May need additional services at discharge

## 2023-02-27 NOTE — PROGRESS NOTES
[facility] 60  INPATIENT OCCUPATIONAL THERAPY  [facility] RENAL TELEMETRY 6K  EVALUATION    Time:   Time In: 5327  Time Out: 1421  Timed Code Treatment Minutes: 48 Minutes  Minutes: 56        Date: 2023  Patient Name: Shai Beasley,   Gender: female      MRN: [id number]  : 1950  (67 y.o.)  Referring Practitioner: Lisette Bender MD  Diagnosis: Acute cystitis without hematuria  Additional Pertinent Hx: Per EMR, \"71 y.o. female with PMH pAfib, SSS s/p PPM, HTN, HLD, DMII, hypothyroidism, anxiety and fibromyalgia who presented to [address] [place] [address] with headache. The patient states her symptoms started 2 days ago. This is associated with fevers, diarrhea, confusion and fatigue/weakness. She denies any nausea, vomiting, chest pain, shortness of breath, abdominal or urinary complaints. The patient reports yesterday evening while using the restroom she bent over to reach something and immediately lost consciousness, hitting her head. She woke up on the floor but was too weak to get up, laying there for 6 hours. She eventually found strength and reached out to PCP who advised her to come in for evaluation. \"    Restrictions/Precautions:  Restrictions/Precautions: Fall Risk, General Precautions, Isolation    Subjective  Chart Reviewed: Yes, Orders, Progress Notes, History and Physical, Imaging  Patient assessed for rehabilitation services?: Yes    Subjective: RN okayed OT session. Upon arrival patient was resting in recliner. Pt was agreeable to OT session.     Pain: 0/10: Pt denies     Vitals: Vitals not assessed per clinical judgement, see nursing flowsheet    Social/Functional History:  Lives With: Alone  Type of Home: House  Home Layout: One level  Home Access: Stairs to enter without rails  Entrance Stairs - Number of Steps: 1 or 2  Home Equipment: Deborah Courser, 4 wheeled, Cierra Downs           ADL Assistance: Independent  Homemaking Assistance: Independent  Ambulation Assistance: Independent  Transfer Assistance: Independent    Active : Yes          VISION:WFL    HEARING:  WFL    COGNITION: Decreased Insight and Decreased Problem Solving    RANGE OF MOTION:  Bilateral Upper Extremity:  WFL    STRENGTH:  Bilateral Upper Extremity:  Impaired - deconditioned     SENSATION:   WFL    ADL:   Grooming: Stand By Assistance. To wash/dry hair and face in shower. Bathing: Stand By Assistance. In shower. Sitting on shower bench and standing with grab bar support. Upper Extremity Dressing: Stand By Assistance and with set-up. Lower Extremity Dressing: Stand By Assistance and with set-up. Footwear Management: Stand By Assistance. BALANCE:  Sitting Balance:  Supervision. Standing Balance: Stand By Assistance. BED MOBILITY:  Sit to Supine: Stand By Assistance    Scooting: Stand By Assistance      TRANSFERS:  Sit to Stand:  Stand By Assistance, cues for hand placement. Stand to Sit: Stand By Assistance. FUNCTIONAL MOBILITY:  Assistive Device: Rolling Walker  Assist Level:  Stand By Assistance. Distance: To and from bathroom  Slow pace, no LOB. Activity Tolerance:  Patient tolerance of  treatment: Good treatment tolerance      Assessment: This 67year old female presents with covid 19 and cystitis. Pt demonstrates decreased endurance and intermittent confusion. Pt requires skilled OT intervention to increase indep and safety with all self cares, transfers, mobility, and IADLs to return to PLOF. Without skilled OT intervention patient is at increased risk for falls, caregiver burden, and hospital readmission after discharge. Pt would benefit from [facility] OT at discharge.      Performance deficits / Impairments: Decreased functional mobility , Decreased endurance, Decreased ADL status, Decreased balance, Decreased safe awareness, Decreased strength, Decreased high-level IADLs  Prognosis: Good  REQUIRES OT FOLLOW-UP: Yes    Treatment Initiated: Treatment and education initiated within context of evaluation. Evaluation time included review of current medical information, gathering information related to past medical, social and functional history, completion of standardized testing, formal and informal observation of tasks, assessment of data and development of plan of care and goals. Treatment time included skilled education and facilitation of tasks to increase safety and independence with ADL's for improved functional independence and quality of life. Discharge Recommendations:  Continue to assess pending progress, Home with Home health OT    Patient Education:     Patient Education  Education Given To: Patient  Education Provided: Role of Therapy, Plan of Care, ADL Adaptive Strategies, Transfer Training  Education Method: Demonstration, Verbal  Barriers to Learning: Cognition  Education Outcome: Continued education needed    Equipment Recommendations:  Equipment Needed: No    Plan:  Times Per Week: 5x  Times Per Day: Once a day  Current Treatment Recommendations: Strengthening, Balance training, Functional mobility training, Endurance training, Safety education & training, Patient/Caregiver education & training, Equipment evaluation, education, & procurement, Self-Care / ADL, Home management training. See long-term goal time frame for expected duration of plan of care. If no long-term goals established, a short length of stay is anticipated. Goals:  Patient goals : Go Home. Short Term Goals  Time Frame for Short Term Goals: Until discharge  Short Term Goal 1: Pt will complete BADL routine including shower Mod Indep to increase indep and endurance within home environment. Short Term Goal 2: Pt will complete standing tolerance x 7 minutes Mod Indep to increase indep with sinkside grooming. Short Term Goal 3: Pt will complete functional mobility to/from BR and HH distances Mod Indep to increase indep with self cares.   Short Term Goal 4: Pt will complete strengthening exercises with min vcs for technique to increase indep and endurance with self cares. Additional Goals?: No         Following session, patient left in safe position with all fall risk precautions in place.

## 2023-02-28 LAB
BACTERIA BLD AEROBE CULT: NORMAL
BACTERIA BLD AEROBE CULT: NORMAL

## 2023-02-28 NOTE — PROGRESS NOTES
Patient has been waiting on the virtual nurse since a few minutes before 7 pm. This nurse provided education verbally and written on discharge instructions, medications, home safety, fall prevention, COVID, hand hygiene and when to follow up with doctor. Patient stated verbal understanding. And taken down via wheelchair by Tech on the floor for discharge home with son.

## 2023-03-12 ENCOUNTER — ANCILLARY PROCEDURE (OUTPATIENT)
Dept: EMERGENCY DEPT | Age: 73
DRG: 315 | End: 2023-03-12
Payer: MEDICARE

## 2023-03-12 ENCOUNTER — APPOINTMENT (OUTPATIENT)
Dept: CT IMAGING | Age: 73
DRG: 315 | End: 2023-03-12
Payer: MEDICARE

## 2023-03-12 ENCOUNTER — APPOINTMENT (OUTPATIENT)
Dept: GENERAL RADIOLOGY | Age: 73
DRG: 315 | End: 2023-03-12
Payer: MEDICARE

## 2023-03-12 ENCOUNTER — HOSPITAL ENCOUNTER (INPATIENT)
Age: 73
LOS: 2 days | Discharge: HOME | DRG: 315 | End: 2023-03-14
Attending: EMERGENCY MEDICINE | Admitting: STUDENT IN AN ORGANIZED HEALTH CARE EDUCATION/TRAINING PROGRAM
Payer: MEDICARE

## 2023-03-12 DIAGNOSIS — S06.0X9A CONCUSSION WITH LOSS OF CONSCIOUSNESS, INITIAL ENCOUNTER: ICD-10-CM

## 2023-03-12 DIAGNOSIS — S00.83XA CONTUSION OF FACE, INITIAL ENCOUNTER: ICD-10-CM

## 2023-03-12 DIAGNOSIS — F41.9 ANXIETY: ICD-10-CM

## 2023-03-12 DIAGNOSIS — R41.82 ALTERED MENTAL STATUS, UNSPECIFIED ALTERED MENTAL STATUS TYPE: ICD-10-CM

## 2023-03-12 DIAGNOSIS — R77.8 ELEVATED TROPONIN: ICD-10-CM

## 2023-03-12 DIAGNOSIS — R29.6 FREQUENT FALLS: Primary | ICD-10-CM
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
## 2023-03-12 LAB
ABO: NORMAL
ALBUMIN SERPL BCG-MCNC: 3.8 G/DL (ref 3.5–5.1)
ALP SERPL-CCNC: 70 U/L (ref 38–126)
ALT SERPL W/O P-5'-P-CCNC: 13 U/L (ref 11–66)
ANION GAP SERPL CALC-SCNC: 14 MEQ/L (ref 8–16)
ANTIBODY SCREEN: NORMAL
AST SERPL-CCNC: 19 U/L (ref 5–40)
BASOPHILS ABSOLUTE: 0.1 THOU/MM3 (ref 0–0.1)
BASOPHILS NFR BLD AUTO: 0.5 %
BILIRUB SERPL-MCNC: 0.3 MG/DL (ref 0.3–1.2)
BILIRUB UR QL STRIP: NEGATIVE
BUN SERPL-MCNC: 21 MG/DL (ref 7–22)
CALCIUM SERPL-MCNC: 9 MG/DL (ref 8.5–10.5)
CHARACTER UR: CLEAR
CHLORIDE SERPL-SCNC: 103 MEQ/L (ref 98–111)
CK SERPL-CCNC: 149 U/L (ref 30–135)
CO2 SERPL-SCNC: 24 MEQ/L (ref 23–33)
COLOR UR: YELLOW
CREAT SERPL-MCNC: 1.1 MG/DL (ref 0.4–1.2)
DEPRECATED RDW RBC AUTO: 46.9 FL (ref 35–45)
EKG ATRIAL RATE: 77 BPM
EKG P AXIS: -2 DEGREES
EKG P-R INTERVAL: 318 MS
EKG Q-T INTERVAL: 414 MS
EKG QRS DURATION: 102 MS
EKG QTC CALCULATION (BAZETT): 468 MS
EKG R AXIS: 38 DEGREES
EKG T AXIS: 70 DEGREES
EKG VENTRICULAR RATE: 77 BPM
EOSINOPHIL NFR BLD AUTO: 1 %
EOSINOPHILS ABSOLUTE: 0.2 THOU/MM3 (ref 0–0.4)
ERYTHROCYTE [DISTWIDTH] IN BLOOD BY AUTOMATED COUNT: 14.5 % (ref 11.5–14.5)
GFR SERPL CREATININE-BSD FRML MDRD: 53 ML/MIN/1.73M2
GLUCOSE BLD STRIP.AUTO-MCNC: 123 MG/DL (ref 70–108)
GLUCOSE BLD STRIP.AUTO-MCNC: 151 MG/DL (ref 70–108)
GLUCOSE SERPL-MCNC: 123 MG/DL (ref 70–108)
GLUCOSE UR QL STRIP.AUTO: NEGATIVE MG/DL
HCT VFR BLD AUTO: 34.3 % (ref 37–47)
HGB BLD-MCNC: 10.6 GM/DL (ref 12–16)
HGB UR QL STRIP.AUTO: NEGATIVE
IMM GRANULOCYTES # BLD AUTO: 0.08 THOU/MM3 (ref 0–0.07)
IMM GRANULOCYTES NFR BLD AUTO: 0.5 %
INR PPP: 3.72 (ref 0.85–1.13)
KETONES UR QL STRIP.AUTO: NEGATIVE
LEUKOCYTE ESTERASE UR QL STRIP.AUTO: NEGATIVE
LIPASE SERPL-CCNC: 86 U/L (ref 5.6–51.3)
LYMPHOCYTES ABSOLUTE: 3.7 THOU/MM3 (ref 1–4.8)
LYMPHOCYTES NFR BLD AUTO: 21.6 %
MCH RBC QN AUTO: 27.5 PG (ref 26–33)
MCHC RBC AUTO-ENTMCNC: 30.9 GM/DL (ref 32.2–35.5)
MCV RBC AUTO: 89.1 FL (ref 81–99)
MONOCYTES ABSOLUTE: 1 THOU/MM3 (ref 0.4–1.3)
MONOCYTES NFR BLD AUTO: 5.9 %
NEUTROPHILS NFR BLD AUTO: 70.5 %
NITRITE UR QL STRIP.AUTO: NEGATIVE
NRBC BLD AUTO-RTO: 0 /100 WBC
OSMOLALITY SERPL CALC.SUM OF ELEC: 285.6 MOSMOL/KG (ref 275–300)
PH UR STRIP.AUTO: 5 [PH] (ref 5–9)
PLATELET # BLD AUTO: 356 THOU/MM3 (ref 130–400)
PMV BLD AUTO: 9.1 FL (ref 9.4–12.4)
POTASSIUM SERPL-SCNC: 5.4 MEQ/L (ref 3.5–5.2)
PROT SERPL-MCNC: 6.7 G/DL (ref 6.1–8)
PROT UR STRIP.AUTO-MCNC: NEGATIVE MG/DL
RBC # BLD AUTO: 3.85 MILL/MM3 (ref 4.2–5.4)
RH FACTOR: NORMAL
SEGMENTED NEUTROPHILS ABSOLUTE COUNT: 12.1 THOU/MM3 (ref 1.8–7.7)
SODIUM SERPL-SCNC: 141 MEQ/L (ref 135–145)
SP GR UR REFRACT.AUTO: 1.02 (ref 1–1.03)
TROPONIN T: 0.04 NG/ML
TROPONIN T: 0.04 NG/ML
TSH SERPL DL<=0.005 MIU/L-ACNC: 1.71 UIU/ML (ref 0.4–4.2)
UROBILINOGEN UR QL STRIP.AUTO: 0.2 EU/DL (ref 0–1)
VALPROATE SERPL-MCNC: < 2.8 UG/ML (ref 50–100)
WBC # BLD AUTO: 17.2 THOU/MM3 (ref 4.8–10.8)

## 2023-03-12 PROCEDURE — 3209999900 POC US FAST ABDOMEN LIMITED

## 2023-03-12 PROCEDURE — APPSS30 APP SPLIT SHARED TIME 16-30 MINUTES: Performed by: NURSE PRACTITIONER

## 2023-03-12 PROCEDURE — 2060000000 HC ICU INTERMEDIATE R&B

## 2023-03-12 PROCEDURE — 93010 ELECTROCARDIOGRAM REPORT: CPT | Performed by: INTERNAL MEDICINE

## 2023-03-12 PROCEDURE — 2580000003 HC RX 258: Performed by: STUDENT IN AN ORGANIZED HEALTH CARE EDUCATION/TRAINING PROGRAM

## 2023-03-12 PROCEDURE — 86850 RBC ANTIBODY SCREEN: CPT

## 2023-03-12 PROCEDURE — 74177 CT ABD & PELVIS W/CONTRAST: CPT

## 2023-03-12 PROCEDURE — 99223 1ST HOSP IP/OBS HIGH 75: CPT | Performed by: INTERNAL MEDICINE

## 2023-03-12 PROCEDURE — 93005 ELECTROCARDIOGRAM TRACING: CPT | Performed by: EMERGENCY MEDICINE

## 2023-03-12 PROCEDURE — 80164 ASSAY DIPROPYLACETIC ACD TOT: CPT

## 2023-03-12 PROCEDURE — 85025 COMPLETE CBC W/AUTO DIFF WBC: CPT

## 2023-03-12 PROCEDURE — 86900 BLOOD TYPING SEROLOGIC ABO: CPT

## 2023-03-12 PROCEDURE — 70486 CT MAXILLOFACIAL W/O DYE: CPT

## 2023-03-12 PROCEDURE — 71260 CT THORAX DX C+: CPT

## 2023-03-12 PROCEDURE — 72125 CT NECK SPINE W/O DYE: CPT

## 2023-03-12 PROCEDURE — 82550 ASSAY OF CK (CPK): CPT

## 2023-03-12 PROCEDURE — 2580000003 HC RX 258: Performed by: EMERGENCY MEDICINE

## 2023-03-12 PROCEDURE — 96360 HYDRATION IV INFUSION INIT: CPT

## 2023-03-12 PROCEDURE — 81003 URINALYSIS AUTO W/O SCOPE: CPT

## 2023-03-12 PROCEDURE — 87449 NOS EACH ORGANISM AG IA: CPT

## 2023-03-12 PROCEDURE — 80053 COMPREHEN METABOLIC PANEL: CPT

## 2023-03-12 PROCEDURE — 82948 REAGENT STRIP/BLOOD GLUCOSE: CPT

## 2023-03-12 PROCEDURE — 85610 PROTHROMBIN TIME: CPT

## 2023-03-12 PROCEDURE — 6370000000 HC RX 637 (ALT 250 FOR IP): Performed by: STUDENT IN AN ORGANIZED HEALTH CARE EDUCATION/TRAINING PROGRAM

## 2023-03-12 PROCEDURE — 80307 DRUG TEST PRSMV CHEM ANLYZR: CPT

## 2023-03-12 PROCEDURE — 83690 ASSAY OF LIPASE: CPT

## 2023-03-12 PROCEDURE — 36415 COLL VENOUS BLD VENIPUNCTURE: CPT

## 2023-03-12 PROCEDURE — 6360000004 HC RX CONTRAST MEDICATION: Performed by: STUDENT IN AN ORGANIZED HEALTH CARE EDUCATION/TRAINING PROGRAM

## 2023-03-12 PROCEDURE — 86901 BLOOD TYPING SEROLOGIC RH(D): CPT

## 2023-03-12 PROCEDURE — 99285 EMERGENCY DEPT VISIT HI MDM: CPT

## 2023-03-12 PROCEDURE — 84484 ASSAY OF TROPONIN QUANT: CPT

## 2023-03-12 PROCEDURE — 70450 CT HEAD/BRAIN W/O DYE: CPT

## 2023-03-12 PROCEDURE — 71045 X-RAY EXAM CHEST 1 VIEW: CPT

## 2023-03-12 PROCEDURE — 84443 ASSAY THYROID STIM HORMONE: CPT

## 2023-03-12 RX ORDER — ENOXAPARIN SODIUM 100 MG/ML
40 INJECTION SUBCUTANEOUS DAILY
Status: DISCONTINUED | OUTPATIENT
Start: 2023-03-12 | End: 2023-03-12

## 2023-03-12 RX ORDER — LEVOTHYROXINE SODIUM 0.05 MG/1
50 TABLET ORAL DAILY
Status: DISCONTINUED | OUTPATIENT
Start: 2023-03-13 | End: 2023-03-14 | Stop reason: HOSPADM

## 2023-03-12 RX ORDER — ACETAMINOPHEN 650 MG/1
650 SUPPOSITORY RECTAL EVERY 6 HOURS PRN
Status: DISCONTINUED | OUTPATIENT
Start: 2023-03-12 | End: 2023-03-14 | Stop reason: HOSPADM

## 2023-03-12 RX ORDER — INSULIN LISPRO 100 [IU]/ML
0-4 INJECTION, SOLUTION INTRAVENOUS; SUBCUTANEOUS
Status: DISCONTINUED | OUTPATIENT
Start: 2023-03-12 | End: 2023-03-14 | Stop reason: HOSPADM

## 2023-03-12 RX ORDER — DEXTROSE MONOHYDRATE 100 MG/ML
INJECTION, SOLUTION INTRAVENOUS CONTINUOUS PRN
Status: DISCONTINUED | OUTPATIENT
Start: 2023-03-12 | End: 2023-03-14 | Stop reason: HOSPADM

## 2023-03-12 RX ORDER — ONDANSETRON 4 MG/1
4 TABLET, ORALLY DISINTEGRATING ORAL EVERY 8 HOURS PRN
Status: DISCONTINUED | OUTPATIENT
Start: 2023-03-12 | End: 2023-03-14 | Stop reason: HOSPADM

## 2023-03-12 RX ORDER — SODIUM CHLORIDE 0.9 % (FLUSH) 0.9 %
5-40 SYRINGE (ML) INJECTION EVERY 12 HOURS SCHEDULED
Status: DISCONTINUED | OUTPATIENT
Start: 2023-03-12 | End: 2023-03-14 | Stop reason: HOSPADM

## 2023-03-12 RX ORDER — ACETAMINOPHEN 325 MG/1
650 TABLET ORAL EVERY 6 HOURS PRN
Status: DISCONTINUED | OUTPATIENT
Start: 2023-03-12 | End: 2023-03-14 | Stop reason: HOSPADM

## 2023-03-12 RX ORDER — PANTOPRAZOLE SODIUM 40 MG/1
40 TABLET, DELAYED RELEASE ORAL
Status: DISCONTINUED | OUTPATIENT
Start: 2023-03-13 | End: 2023-03-14 | Stop reason: HOSPADM

## 2023-03-12 RX ORDER — 0.9 % SODIUM CHLORIDE 0.9 %
1000 INTRAVENOUS SOLUTION INTRAVENOUS ONCE
Status: COMPLETED | OUTPATIENT
Start: 2023-03-12 | End: 2023-03-12

## 2023-03-12 RX ORDER — NORTRIPTYLINE HYDROCHLORIDE 10 MG/1
10 CAPSULE ORAL NIGHTLY
Status: DISCONTINUED | OUTPATIENT
Start: 2023-03-12 | End: 2023-03-14 | Stop reason: HOSPADM

## 2023-03-12 RX ORDER — LANOLIN ALCOHOL/MO/W.PET/CERES
400 CREAM (GRAM) TOPICAL 2 TIMES DAILY
Status: DISCONTINUED | OUTPATIENT
Start: 2023-03-12 | End: 2023-03-14 | Stop reason: HOSPADM

## 2023-03-12 RX ORDER — SODIUM CHLORIDE, SODIUM LACTATE, POTASSIUM CHLORIDE, CALCIUM CHLORIDE 600; 310; 30; 20 MG/100ML; MG/100ML; MG/100ML; MG/100ML
INJECTION, SOLUTION INTRAVENOUS CONTINUOUS
Status: DISCONTINUED | OUTPATIENT
Start: 2023-03-12 | End: 2023-03-13

## 2023-03-12 RX ORDER — GABAPENTIN 600 MG/1
600 TABLET ORAL 4 TIMES DAILY
Status: DISCONTINUED | OUTPATIENT
Start: 2023-03-12 | End: 2023-03-13

## 2023-03-12 RX ORDER — METOPROLOL SUCCINATE 50 MG/1
50 TABLET, EXTENDED RELEASE ORAL 2 TIMES DAILY
Status: DISCONTINUED | OUTPATIENT
Start: 2023-03-12 | End: 2023-03-14 | Stop reason: HOSPADM

## 2023-03-12 RX ORDER — LISINOPRIL 20 MG/1
20 TABLET ORAL DAILY
Status: DISCONTINUED | OUTPATIENT
Start: 2023-03-12 | End: 2023-03-13

## 2023-03-12 RX ORDER — ONDANSETRON 2 MG/ML
4 INJECTION INTRAMUSCULAR; INTRAVENOUS EVERY 6 HOURS PRN
Status: DISCONTINUED | OUTPATIENT
Start: 2023-03-12 | End: 2023-03-14 | Stop reason: HOSPADM

## 2023-03-12 RX ORDER — SODIUM CHLORIDE 9 MG/ML
INJECTION, SOLUTION INTRAVENOUS PRN
Status: DISCONTINUED | OUTPATIENT
Start: 2023-03-12 | End: 2023-03-14 | Stop reason: HOSPADM

## 2023-03-12 RX ORDER — SODIUM CHLORIDE 0.9 % (FLUSH) 0.9 %
5-40 SYRINGE (ML) INJECTION PRN
Status: DISCONTINUED | OUTPATIENT
Start: 2023-03-12 | End: 2023-03-14 | Stop reason: HOSPADM

## 2023-03-12 RX ORDER — POLYETHYLENE GLYCOL 3350 17 G/17G
17 POWDER, FOR SOLUTION ORAL DAILY PRN
Status: DISCONTINUED | OUTPATIENT
Start: 2023-03-12 | End: 2023-03-14 | Stop reason: HOSPADM

## 2023-03-12 RX ORDER — CLONAZEPAM 1 MG/1
1 TABLET ORAL 3 TIMES DAILY
Status: DISCONTINUED | OUTPATIENT
Start: 2023-03-12 | End: 2023-03-14 | Stop reason: HOSPADM

## 2023-03-12 RX ORDER — FLECAINIDE ACETATE 50 MG/1
50 TABLET ORAL 2 TIMES DAILY
Status: DISCONTINUED | OUTPATIENT
Start: 2023-03-12 | End: 2023-03-14 | Stop reason: HOSPADM

## 2023-03-12 RX ORDER — INSULIN LISPRO 100 [IU]/ML
0-4 INJECTION, SOLUTION INTRAVENOUS; SUBCUTANEOUS NIGHTLY
Status: DISCONTINUED | OUTPATIENT
Start: 2023-03-12 | End: 2023-03-14 | Stop reason: HOSPADM

## 2023-03-12 RX ADMIN — LISINOPRIL 20 MG: 20 TABLET ORAL at 21:59

## 2023-03-12 RX ADMIN — SODIUM CHLORIDE, POTASSIUM CHLORIDE, SODIUM LACTATE AND CALCIUM CHLORIDE: 600; 310; 30; 20 INJECTION, SOLUTION INTRAVENOUS at 18:13

## 2023-03-12 RX ADMIN — SODIUM CHLORIDE 1000 ML: 9 INJECTION, SOLUTION INTRAVENOUS at 13:36

## 2023-03-12 RX ADMIN — SODIUM CHLORIDE 1000 ML: 9 INJECTION, SOLUTION INTRAVENOUS at 14:58

## 2023-03-12 RX ADMIN — FLECAINIDE ACETATE 50 MG: 50 TABLET ORAL at 21:59

## 2023-03-12 RX ADMIN — IOPAMIDOL 80 ML: 755 INJECTION, SOLUTION INTRAVENOUS at 16:31

## 2023-03-12 RX ADMIN — Medication 400 MG: at 21:59

## 2023-03-12 ASSESSMENT — ENCOUNTER SYMPTOMS
SINUS PAIN: 0
CONSTIPATION: 0
WHEEZING: 0
EYE ITCHING: 0
DIARRHEA: 1
VOMITING: 0
SORE THROAT: 0
COLOR CHANGE: 0
EYE REDNESS: 0
EYE PAIN: 0
CHOKING: 0
BLOOD IN STOOL: 0
BACK PAIN: 0
VOICE CHANGE: 0
EYE DISCHARGE: 0
SINUS PRESSURE: 0
SHORTNESS OF BREATH: 0
FACIAL SWELLING: 0
NAUSEA: 0
RHINORRHEA: 0
ABDOMINAL PAIN: 0
APNEA: 0
ABDOMINAL DISTENTION: 0
CHEST TIGHTNESS: 0
COLOR CHANGE: 1
TROUBLE SWALLOWING: 0
STRIDOR: 0
DIARRHEA: 0
PHOTOPHOBIA: 0
COUGH: 0

## 2023-03-12 ASSESSMENT — PAIN SCALES - GENERAL: PAINLEVEL_OUTOF10: 0

## 2023-03-12 NOTE — ED PROVIDER NOTES
[facility] ENCOUNTER          Pt Name: Corine Ferraro  MRN: [id number]  [place] 1950  Date of evaluation: 3/12/2023  Emergency Physician: Kenyatta Burleson DO  Supervising Physician: Dr. Daniel Woodruff       Chief Complaint   Patient presents with    Fall    Head Injury            History obtained from the patient's son and the patient. HISTORY OF PRESENT ILLNESS    HPI  Corine Ferraro is a 67 y.o. female who presents to the emergency department for evaluation of fall with head injury. The patient presented to the emergency department after her son drove her here. She lives at home alone and has a home health nurse, who saw the patient today and the patient states that she noticed she had a low blood pressure which prompted her to refer the patient to the emergency department. The patient is also fallen repeatedly multiple times at home, most recently yesterday when she struck her head on the bathtub. The patient reports that she feels like she lost consciousness after hitting her head and she is not sure how she fell or if she had symptoms prior to falling. The patient has bruising and swelling to her left orbit. There was concern reported that the patient could be taking her medication improperly at home. The patient has no other acute complaints at this time. REVIEW OF SYSTEMS   Review of Systems   Constitutional:  Negative for fever. HENT:  Negative for rhinorrhea, sinus pressure and sinus pain. Eyes:  Negative for discharge, redness and itching. Respiratory:  Negative for chest tightness and shortness of breath. Cardiovascular:  Negative for chest pain. Gastrointestinal:  Negative for abdominal distention, abdominal pain, diarrhea, nausea and vomiting. Genitourinary:  Negative for difficulty urinating, dysuria, frequency, hematuria and urgency. Musculoskeletal:  Negative for arthralgias.    Skin:  Negative for color change and pallor. Neurological:  Positive for headaches. Negative for dizziness and light-headedness.        PAST MEDICAL AND SURGICAL HISTORY     Past Medical History:   Diagnosis Date    Anxiety     Arthritis     Atrial fibrillation (HCC)     Fibromyalgia     GERD (gastroesophageal reflux disease)     Hyperlipidemia     Hypertension     Hypothyroidism     Medtronic dual pacer  1/18/2023    Neuropathy     arms and legs    Osteoporosis     Type 2 diabetes mellitus without complication (HCC)      Past Surgical History:   Procedure Laterality Date    APPENDECTOMY      BLADDER SURGERY      nerve block placed per pt x 3    BLADDER SUSPENSION      CARPAL TUNNEL RELEASE Right     CHOLECYSTECTOMY      COLONOSCOPY  2010, 2013    CYST REMOVAL      HYSTERECTOMY (CERVIX STATUS UNKNOWN)      PACEMAKER INSERTION  09/2020    Dr Sofie Carrillo ENDOSCOPY  [phone]    UPPER GASTROINTESTINAL ENDOSCOPY           MEDICATIONS     Current Facility-Administered Medications:     sodium chloride flush 0.9 % injection 5-40 mL, 5-40 mL, IntraVENous, 2 times per day, Storm Allaner, DO    sodium chloride flush 0.9 % injection 5-40 mL, 5-40 mL, IntraVENous, PRN, Storm Ríos, DO    0.9 % sodium chloride infusion, , IntraVENous, PRN, Strom Allaner, DO    enoxaparin (LOVENOX) injection 40 mg, 40 mg, SubCUTAneous, Daily, Storm Allaner, DO    ondansetron (ZOFRAN-ODT) disintegrating tablet 4 mg, 4 mg, Oral, Q8H PRN **OR** ondansetron (ZOFRAN) injection 4 mg, 4 mg, IntraVENous, Q6H PRN, Storm Allaner, DO    polyethylene glycol (GLYCOLAX) packet 17 g, 17 g, Oral, Daily PRN, Storm Allaner, DO    acetaminophen (TYLENOL) tablet 650 mg, 650 mg, Oral, Q6H PRN **OR** acetaminophen (TYLENOL) suppository 650 mg, 650 mg, Rectal, Q6H PRN, Storm Ríos, DO    Current Outpatient Medications:     lisinopril (PRINIVIL;ZESTRIL) 20 MG tablet, Take 1 tablet by mouth daily, Disp: 30 tablet, Rfl: 3 metoprolol succinate (TOPROL XL) 50 MG extended release tablet, Take 2 tablets by mouth in the morning and at bedtime, Disp: 30 tablet, Rfl: 3    clonazePAM (KLONOPIN) 2 MG tablet, Take 2 mg by mouth in the morning and 2 mg in the evening., Disp: , Rfl:     clonazePAM (KLONOPIN) 2 MG tablet, Take 4 mg by mouth nightly., Disp: , Rfl:     divalproex (DEPAKOTE ER) 250 MG extended release tablet, Take 250 mg by mouth daily, Disp: , Rfl:     meclizine (ANTIVERT) 25 MG CHEW, Take 25 mg by mouth 3 times daily as needed (as needed for dizziness), Disp: , Rfl:     nortriptyline (PAMELOR) 10 MG capsule, Take 10 mg by mouth nightly Take 2 caps PO QD, Disp: , Rfl:     glimepiride (AMARYL) 2 MG tablet, Take 2 mg by mouth 3 times daily (before meals), Disp: , Rfl:     magnesium oxide (MAG-OX) 400 MG tablet, Take 400 mg by mouth 2 times daily, Disp: , Rfl:     flecainide (TAMBOCOR) 50 MG tablet, Take 1 tablet by mouth 2 times daily, Disp: 60 tablet, Rfl: 5    rivaroxaban (XARELTO) 20 MG TABS tablet, Take 1 tablet by mouth daily (with breakfast), Disp: 30 tablet, Rfl: 2    pantoprazole (PROTONIX) 40 MG tablet, Take 1 tablet by mouth every morning (before breakfast), Disp: 30 tablet, Rfl: 3    clonazePAM (KLONOPIN) 1 MG tablet, Take 1 tablet by mouth 3 times daily as needed for Anxiety for up to 90 days. (Patient taking differently: Take 1 mg by mouth 3 times daily as needed for Anxiety. One in AM two at night.), Disp: 270 tablet, Rfl: 0    levothyroxine (SYNTHROID) 50 MCG tablet, Take 1 tablet by mouth Daily, Disp: 90 tablet, Rfl: 0    JANUMET  MG per tablet, Take 1 tablet by mouth 2 times daily (with meals), Disp: 60 tablet, Rfl: 0    gabapentin (NEURONTIN) 600 MG tablet, Take 1 tablet by mouth 4 times daily for 152 days.  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   Vaping Use    Vaping Use: Never used   Substance Use Topics    Alcohol use: Yes     Comment: very rarely    Drug use: No         ALLERGIES     Allergies   Allergen Reactions    Adhesive Tape Other (See Comments)     redness    Codeine     Sulfa Antibiotics          FAMILY HISTORY     Family History   Problem Relation Age of Onset    Arthritis Mother     Miscarriages / Stillbirths Mother     Lung Cancer Mother 80    Hearing Loss Father     Heart Disease Father     High Blood Pressure Father     Cancer Sister 67        throat cancer    Stomach Cancer Sister     Other Brother         throat cancer    Diabetes Paternal Aunt     Other Brother         throat cancer    Other Brother         throat cancer    Uterine Cancer Sister          PHYSICAL EXAM     ED Triage Vitals [23 1304]   BP Temp Temp src Heart Rate Resp SpO2 Height Weight   (!) 108/51 98.1 °F (36.7 °C) -- 78 18 98 % -- --         Additional Vital Signs:  Vitals:    23 1546   BP: (!) 98/50   Pulse: 70   Resp: 12   Temp:    SpO2: 100%       Physical Exam  Vitals and nursing note reviewed. Constitutional:       General: She is not in acute distress. Appearance: Normal appearance. She is ill-appearing. HENT:      Head: Normocephalic and atraumatic. Comments: There is left orbital swelling and periorbital ecchymosis. Nose: Nose normal. No congestion or rhinorrhea. Mouth/Throat:      Mouth: Mucous membranes are moist.      Pharynx: Oropharynx is clear. No oropharyngeal exudate or posterior oropharyngeal erythema. Eyes:      Extraocular Movements: Extraocular movements intact. Pupils: Pupils are equal, round, and reactive to light.    Cardiovascular:      Rate and Rhythm: Normal rate and regular rhythm. Heart sounds: Normal heart sounds. Pulmonary:      Effort: Pulmonary effort is normal.      Breath sounds: Normal breath sounds. Abdominal:      General: Abdomen is flat. There is no distension. Palpations: Abdomen is soft. Tenderness: There is no abdominal tenderness. Musculoskeletal:         General: No swelling or tenderness. Normal range of motion. Cervical back: Normal range of motion. No rigidity or tenderness. Right lower leg: No edema. Left lower leg: No edema. Skin:     General: Skin is warm and dry. Neurological:      General: No focal deficit present. Mental Status: She is alert and oriented to person, place, and time. Mental status is at baseline. INITIAL IMPRESSION, DIFFERENTIAL DIAGNOSIS, AND PLAN   Initial Assessment: Given the patient's above chief complaint and findings on history and physical examination, I thought it was appropriate to consider the following emergency medical conditions: Anemia, electrolyte abnormality, ACS, COVID-19, influenza, pneumonia, pneumothorax, BINA, UTI, thyroid abnormality, abnormal INR, hepatitis, pancreatitis, valproic acid toxicity, polypharmacy, rhabdomyolysis. Although some of these diagnoses are unlikely they were considered in my medical decision making.     Chronic Conditions considered:   Patient Active Problem List   Diagnosis    Restless legs syndrome    Fibromyalgia    GERD (gastroesophageal reflux disease)    Type 2 diabetes mellitus with diabetic polyneuropathy, without long-term current use of insulin (HCC)    Vasomotor rhinitis    Left upper quadrant abdominal pain    Lactic acidosis    BINA (acute kidney injury) ([address]    Hypothyroidism    RUQ abdominal pain    Atrial fibrillation with RVR ([address]    Uncontrolled type 2 diabetes mellitus with hyperglycemia ([address]    Medtronic dual pacer     COVID    Accident due to mechanical fall without injury, initial encounter Diagnostic: IV, labs, EKG, chest x-ray, CT head, cervical spine, and facial bones without contrast.    Therapeutic: 1 L IV fluid bolus. ED RESULTS   Laboratory results:  Labs Reviewed   CBC WITH AUTO DIFFERENTIAL - Abnormal; Notable for the following components:       Result Value    WBC 17.2 (*)     RBC 3.85 (*)     Hemoglobin 10.6 (*)     Hematocrit 34.3 (*)     MCHC 30.9 (*)     RDW-SD 46.9 (*)     MPV 9.1 (*)     Segs Absolute 12.1 (*)     Immature Grans (Abs) 0.08 (*)     All other components within normal limits   COMPREHENSIVE METABOLIC PANEL W/ REFLEX TO MG FOR LOW K - Abnormal; Notable for the following components:    Glucose 123 (*)     Potassium reflex Magnesium 5.4 (*)     All other components within normal limits   LIPASE - Abnormal; Notable for the following components:    Lipase 86.0 (*)     All other components within normal limits   TROPONIN - Abnormal; Notable for the following components:    Troponin T 0.038 (*)     All other components within normal limits   PROTIME-INR - Abnormal; Notable for the following components:    INR 3.72 (*)     All other components within normal limits   GLOMERULAR FILTRATION RATE, ESTIMATED - Abnormal; Notable for the following components:    Est, Glom Filt Rate 53 (*)     All other components within normal limits   CK - Abnormal; Notable for the following components: Total  (*)     All other components within normal limits   POCT GLUCOSE - Abnormal; Notable for the following components:    POC Glucose 123 (*)     All other components within normal limits   C DIFF TOXIN/ANTIGEN   URINALYSIS   ANION GAP   OSMOLALITY   URINE DRUG SCREEN   VALPROIC ACID LEVEL, TOTAL   TYPE AND SCREEN       Radiologic studies results:  CT ABDOMEN PELVIS W IV CONTRAST Additional Contrast? None   Final Result      No acute intra-abdominal or intrapelvic findings.       Final report electronically signed by Dr. Talia Ruvalcaba on 3/12/2023 4:51 PM      CT CHEST W CONTRAST Final Result      Minimal bilateral lower lung atelectasis/infiltrate. Final report electronically signed by Dr. Jh Garrison on 3/12/2023 4:49 PM      POC US FAST ABDOMEN LIMITED   Final Result      CT HEAD WO CONTRAST   Final Result   1. No mass effect or acute hemorrhage. 2. Chronic periventricular small vessel ischemic changes and cerebral atrophy. **This report has been created using voice recognition software. It may contain minor errors which are inherent in voice recognition technology. **      Final report electronically signed by Dr. Jh Garrison on 3/12/2023 2:06 PM      CT FACIAL BONES WO CONTRAST   Final Result    No evidence of acute osseous injury of the face. **This report has been created using voice recognition software. It may contain minor errors which are inherent in voice recognition technology. **      Final report electronically signed by Dr. Melvi Esparza MD on 3/12/2023 2:06 PM      CT CERVICAL SPINE WO CONTRAST   Final Result    No evidence of acute osseous injury of the cervical spine. **This report has been created using voice recognition software. It may contain minor errors which are inherent in voice recognition technology. **      Final report electronically signed by Dr. Melvi Esparza MD on 3/12/2023 2:08 PM      XR CHEST PORTABLE   Final Result      No acute intrathoracic process. **This report has been created using voice recognition software. It may contain minor errors which are inherent in voice recognition technology. **      Final report electronically signed by Dr. Jh Garrison on 3/12/2023 1:23 PM          ED Medications administered this visit:   Medications   sodium chloride flush 0.9 % injection 5-40 mL (has no administration in time range)   sodium chloride flush 0.9 % injection 5-40 mL (has no administration in time range)   0.9 % sodium chloride infusion (has no administration in time range) enoxaparin (LOVENOX) injection 40 mg (has no administration in time range)   ondansetron (ZOFRAN-ODT) disintegrating tablet 4 mg (has no administration in time range)     Or   ondansetron (ZOFRAN) injection 4 mg (has no administration in time range)   polyethylene glycol (GLYCOLAX) packet 17 g (has no administration in time range)   acetaminophen (TYLENOL) tablet 650 mg (has no administration in time range)     Or   acetaminophen (TYLENOL) suppository 650 mg (has no administration in time range)   0.9 % sodium chloride bolus (0 mLs IntraVENous Stopped 3/12/23 1458)   0.9 % sodium chloride bolus (1,000 mLs IntraVENous New Bag 3/12/23 1458)   iopamidol (ISOVUE-370) 76 % injection 80 mL (80 mLs IntraVENous Given 3/12/23 1631)         [facility]     ED Course as of 03/12/23 1704   Sun Mar 12, 2023   1425 Troponin T(!): 0.038 [DO]   1425 CBC Auto Differential(!):    WBC 17.2(!)   RBC 3.85(!)   Hemoglobin Quant 10.6(!)   Hematocrit 34.3(!)   MCV 89.1   MCH 27.5   MCHC 30.9(!)   RDW-CV 14.5   RDW-SD 46.9(!)   Platelet Count 012   MPV 9.1(!)   Seg Neutrophils 70.5   Lymphocytes 21.6   Monocytes 5.9   Eosinophils 1.0   Basophils 0.5   Immature Granulocytes 0.5   Segs Absolute 12.1(!)   Lymphocytes Absolute 3.7   Monocytes Absolute 1.0   Eosinophils Absolute 0.2   Basophils Absolute 0.1   Immature Grans (Abs) 0.08(!)   Nucleated Red Blood Cells 0  WBC with a left shift [DO]   1436 BP in the high 80s and low 90s after 1 L IV fluid bolus. We will plan to start an additional 1 L IV fluid bolus. [DO]   1557 Urinalysis, reflex to microscopic:    Glucose, Urine NEGATIVE   Bilirubin, Urine NEGATIVE   Ketones, Urine NEGATIVE   Specific Gravity, UA 1.016   Blood, Urine NEGATIVE   pH, UA 5.0   Protein, UA NEGATIVE   Urobilinogen, Urine 0.2   Nitrite, Urine NEGATIVE   Leukocytes, UA NEGATIVE   Color, UA YELLOW   Character, Urine CLEAR  Urine does not appear infectious.   The patient appears persistently altered per the patient's nurse. Given that the patient has a prominent leukocytosis and a left shift, we will plan to add on a CT chest and abdomen and pelvis with IV contrast to further evaluate for an infectious source, start empiric vancomycin and Zosyn, and admit the patient to the hospital. [DO]   1608 Case discussed with Stanislav Villegas with with the trauma service, who agreed with admitting the patient to the medicine service. [DO]      ED Course User Index  [DO] Umm Ochoa DO     Available laboratory and imaging results were independently reviewed and clinically correlated. Decision Rules/Clinical Scores utilized: Gideon Palmer Code Status:  Not addressed during this ED visit    [facility] Social determinants of health considered to potentially effect treatment and/or disposition plan:    Healthy People 2030, U.[place] Department of Health and Human Services, Office of Disease Prevention and Health Promotion. Older age     Medical comorbidities impacting treatment or disposition:   Atrial fibrillation anticoagulated on Xarelto    Past Medical History:   Diagnosis Date    Anxiety     Arthritis     Atrial fibrillation (HCC)     Fibromyalgia     GERD (gastroesophageal reflux disease)     Hyperlipidemia     Hypertension     Hypothyroidism     Medtronic dual pacer  1/18/2023    Neuropathy     arms and legs    Osteoporosis     Type 2 diabetes mellitus without complication ([facility] [facility]        Consultants: Not Applicable. Final Assessment and Plan:   Per ED course      The diagnosis, extensive differential diagnosis, plan of care, laboratory, and imaging findings were discussed at the bedside. All questions and concerns were addressed at the time of the encounter.   MEDICATION CHANGES     DISCHARGE MEDICATIONS:  New Prescriptions    No medications on file            FINAL DISPOSITION     Final diagnoses:   Frequent falls   Concussion with loss of consciousness, initial encounter   Contusion of face, initial encounter   Altered mental status, unspecified altered mental status type   Elevated troponin     Condition: condition: serious  Dispo: Admit to med/surg floor    PATIENT REFERRED TO:  No follow-up provider specified. Critical Care Time   CRITICAL CARE:  None    PROCEDURES: (None if blank)  Procedures:   Medical Decision Making      This transcription was electronically signed. Parts of this transcriptions may have been dictated by use of voice recognition software and electronically transcribed, and parts may have been transcribed with the assistance of an ED scribe. The transcription may contain errors not detected in proofreading.     Electronically Signed: Connor Boudreaux DO, 03/12/23, 5:04 PM     Connor Boudreaux DO  Resident  03/12/23 1730

## 2023-03-12 NOTE — ED NOTES
Patient resting on cart with eyes closed and respirations easy. Awaiting bed placement.       Nikki Ernandez RN  03/12/23 9641

## 2023-03-12 NOTE — ED NOTES
Patient noted to desat to 88% while sleeping, oxygen applied at 2L via nasal cannula.      Maria Dolores Hart RN  03/12/23 0143

## 2023-03-12 NOTE — ED NOTES
Patient noted to be very drowsy, repositioned in bed. Vitals as documented.       Fernanda Whitmore RN  03/12/23 6851

## 2023-03-12 NOTE — ED PROVIDER NOTES
[address] [facility] MEDICINE ATTENDING ATTESTATION      Evaluation of Blanca Moseley. Case discussed and care plan developed with resident physician. I agree with the resident physician documentation and plan as documented by him, except if my documentation differs. Patient seen, interviewed and examined by me. I reviewed the medical, surgical, family and social history, medications and allergies. I have reviewed the nursing documentation. Brief H&P   Patient walked into the front lobby with her son complaining of head injury after fall. Patient states she sustained a nonsyncopal fall last night and possibly had a an episode of loss of consciousness but she is not clear what what happened at that time. This morning she was noted by her son to have bruising to her face. Patient's son reports she was recently admitted to the hospital for COVID-19, discharged from the hospital about a week ago after refusing to go to rehab or nursing home. Since then she has been having multiple falls. In addition to this states he was having bilateral lower extremity weakness precipitating this falls and diarrhea, both of which appear to have been present before her previous admission for COVID-19. Physical exam is notable for well appearing elderly female, oriented. There is a significant bruising to the left forehead and left periorbital area. Otherwise nonfocal exam.  No additional acute injuries found. Patient has old healed abrasions to bilateral knees. Medical Decision Making   MDM:   Nonsyncopal fall with underlying fall syndrome  Head injury with loss of consciousness, on anticoagulation with Xarelto. Rule out C-spine injury  Plan:   IV line, labs  Imaging  EKG  Analgesia if needed  Patient meets criteria for level 2 trauma activation per ACS criteria.   Observation in the ED while awaiting results    Please see the resident physician completed note for final disposition except as documented on this attestation. I have reviewed and interpreted all available lab, radiology and ekg results available at the moment. Diagnosis, treatment and disposition plans were discussed and agreed upon by patient. This transcription was electronically signed. It was dictated by use of voice recognition software and electronically transcribed. The transcription may contain errors not detected in proofreading.      I performed direct supervision and was present for the critical portion following procedures: None  Critical care time on this case: None    Electronically signed by Levi Agrawal MD on 3/12/23 at 1:26 PM EDT       Levi Agrawal MD  03/12/23 4586

## 2023-03-12 NOTE — PROGRESS NOTES
Virtual RN completed admission history with patient. Patient comfortable in bed, call light in reach, agrees to virtual safety rounds, and has no questions at this time. Pt brought in a list for home medications given to bedside RN.

## 2023-03-12 NOTE — CONSULTS
Baljeet Plummer     Patient:  Ivonne Valenzuela  Admit date: 3/12/2023   YOB: 1950 Date of Evaluation: 3/12/2023  MRN: [id number]  Acct: [de-identified]    Injury Date:03/11/2023  Injury time:Evening  PCP: Bro Torres   Referring physician: Dr. Sara Palafox    Time of Trauma Surgeon Notification:  [phone]  Time of Trauma TAHIR Arrival: 1314  Time of Trauma Surgeon Arrival:    Services Requested Within 30 Minutes: None   Time Contacted:N/A    Assessment:    Trauma by fall  Left facial ecchymosis  Dehydration  Diarrheal illness  Generalized weakness  Hypotension  Plan:    No acute traumatic injuries requiring admission to the trauma service.   Disposition per ER provider    Activation: []Level I (Trauma Alert) [x]Level II (Injury Call) []Level III (Trauma Consult) []Downgraded  Mode of Arrival: family  Referring Facility: N/A   Loss of Consciousness [x]No []Yes[]Unknown  Duration(min)  Mechanism of Injury:  []Motor Vehicle crash   []Single Vehicle [] []Passenger []Scene Fatality []Front Seat  []Restrained   []Air Bag Deployed   []Ejected []Rollover []Pedestrian []Trapped   Type of vehicle:   Protective Devices:   []Motorcycle  Wearing Helmet []Yes []No  []Bicycle  Wearing Helmet []Yes []No  [x]Fall   Distance - ground level    []Assault    Abuse Reported []Yes []No  []Gunshot  []Stabbing  []Work Related  []Burn: []Flame []Scald []Electrical []Chemical []Contact []Inhalation []House Fire  []Other:   Patient Active Problem List   Diagnosis    Restless legs syndrome    Fibromyalgia    GERD (gastroesophageal reflux disease)    Type 2 diabetes mellitus with diabetic polyneuropathy, without long-term current use of insulin (HCC)    Vasomotor rhinitis    Left upper quadrant abdominal pain    Lactic acidosis    BINA (acute kidney injury) ([facility]    Hypothyroidism    RUQ abdominal pain    Atrial fibrillation with RVR ([facility]    Uncontrolled type 2 diabetes mellitus with hyperglycemia ([facility] [address] Medtronic dual pacer     COVID     Subjective   Chief Complaint: Fall    History of Present Illness:  Moses Avendano is a 67year old female presenting to the Emergency Department for evaluation of potential injuries sustained in a fall last evening. She reports that she was in the bathroom when she fell. She does not recall feeling lightheaded or dizzy prior to falling. She was not able to get up immediately following the fall due to her legs feeling \"like Jell-O\". She reports that she hit her face on the bathtub. She takes Xarelto. Family at bedside reports that patient has been talking \"like she is drunk, slurring her words\" for a few months and they are concerned about her medications and her blood pressure. She was recently released from the hospital, approximately one week ago, after being treated for COVID-19. She currently complains of pain in her left cheek and nose but denies any other complaints aside from weakness and diarrhea. Review of Systems:   Review of Systems   Constitutional:  Negative for activity change, appetite change, chills, diaphoresis, fatigue, fever and unexpected weight change. HENT:  Negative for congestion, dental problem, drooling, ear discharge, ear pain, facial swelling, hearing loss, mouth sores, nosebleeds, postnasal drip, rhinorrhea, sinus pressure, sneezing, sore throat, tinnitus, trouble swallowing and voice change. Eyes:  Negative for photophobia, pain, discharge, redness, itching and visual disturbance. Respiratory:  Negative for apnea, cough, choking, chest tightness, shortness of breath, wheezing and stridor. Cardiovascular:  Negative for chest pain, palpitations and leg swelling. Gastrointestinal:  Positive for diarrhea. Negative for abdominal distention, abdominal pain, blood in stool, constipation, nausea and vomiting. Endocrine: Negative for cold intolerance, heat intolerance, polydipsia, polyphagia and polyuria.    Genitourinary:  Negative for difficulty urinating, dysuria, flank pain, frequency, hematuria and urgency. Musculoskeletal:  Negative for arthralgias, back pain, gait problem, joint swelling, myalgias, neck pain and neck stiffness. Skin:  Positive for color change (Ecchymosis to left side of face). Negative for pallor, rash and wound. Allergic/Immunologic: Negative for environmental allergies, food allergies and immunocompromised state. Neurological:  Positive for speech difficulty (Slurred) and weakness. Negative for dizziness, tremors, seizures, syncope, facial asymmetry, light-headedness, numbness and headaches. Hematological:  Negative for adenopathy. Bruises/bleeds easily. Psychiatric/Behavioral:  Negative for agitation, behavioral problems, confusion, decreased concentration, dysphoric mood, hallucinations, self-injury, sleep disturbance and suicidal ideas. The patient is not nervous/anxious and is not hyperactive.       Adhesive tape, Codeine, and Sulfa antibiotics  Past Surgical History:   Procedure Laterality Date    APPENDECTOMY      BLADDER SURGERY      nerve block placed per pt x 3    BLADDER SUSPENSION      CARPAL TUNNEL RELEASE Right     CHOLECYSTECTOMY      COLONOSCOPY  2010, 2013    CYST REMOVAL      HYSTERECTOMY (CERVIX STATUS UNKNOWN)      PACEMAKER INSERTION  09/2020    Dr Chung [facility] GASTROINTESTINAL ENDOSCOPY  [id number],2261    UPPER GASTROINTESTINAL ENDOSCOPY       Past Medical History:   Diagnosis Date    Anxiety     Arthritis     Atrial fibrillation (HCC)     Fibromyalgia     GERD (gastroesophageal reflux disease)     Hyperlipidemia     Hypertension     Hypothyroidism     Medtronic dual pacer  1/18/2023    Neuropathy     arms and legs    Osteoporosis     Type 2 diabetes mellitus without complication (HCC)      Past Surgical History:   Procedure Laterality Date    APPENDECTOMY      BLADDER SURGERY      nerve block placed per pt x 3    BLADDER SUSPENSION      CARPAL TUNNEL RELEASE Right CHOLECYSTECTOMY      COLONOSCOPY  ,     CYST REMOVAL      HYSTERECTOMY (CERVIX STATUS UNKNOWN)      PACEMAKER INSERTION  2020    Dr Fuentes    TUBAL LIGATION      UPPER GASTROINTESTINAL ENDOSCOPY  ,    UPPER GASTROINTESTINAL ENDOSCOPY       Social History     Socioeconomic History    Marital status:      Spouse name: None    Number of children: None    Years of education: None    Highest education level: None   Tobacco Use    Smoking status: Former     Packs/day: 0.25     Years: 4.00     Pack years: 1.00     Types: Cigarettes     Quit date: 1989     Years since quittin.2    Smokeless tobacco: Never    Tobacco comments:     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   Vaping Use    Vaping Use: Never used   Substance and Sexual Activity    Alcohol use: Yes     Comment: very rarely    Drug use: No     Family History   Problem Relation Age of Onset    Arthritis Mother     Miscarriages / Stillbirths Mother     Lung Cancer Mother 85    Hearing Loss Father     Heart Disease Father     High Blood Pressure Father     Cancer Sister 72        throat cancer    Stomach Cancer Sister     Other Brother         throat cancer    Diabetes Paternal Aunt     Other Brother         throat cancer    Other Brother         throat cancer    Uterine Cancer Sister        Home medications:    Previous Medications    CLONAZEPAM (KLONOPIN) 1 MG TABLET    Take 1 tablet by mouth 3 times daily as needed for Anxiety for up to 90 days.    CLONAZEPAM (KLONOPIN) 2 MG TABLET    Take 2 mg by mouth in the morning and 2 mg in the evening.    CLONAZEPAM (KLONOPIN) 2 MG TABLET    Take 4 mg by mouth nightly.    DIVALPROEX (DEPAKOTE ER) 250 MG EXTENDED RELEASE TABLET    Take 250 mg by mouth daily    FLECAINIDE (TAMBOCOR) 50 MG TABLET    Take 1 tablet by mouth 2 times daily    GABAPENTIN (NEURONTIN) 600 MG TABLET    Take 1 tablet by mouth 4 times daily for 152 days. One tablet in AM, one tablet at lunch time, 2 tablets at bedtime     GLIMEPIRIDE (AMARYL) 2 MG TABLET    Take 2 mg by mouth 3 times daily (before meals)    JANUMET  MG PER TABLET    Take 1 tablet by mouth 2 times daily (with meals)    LEVOTHYROXINE (SYNTHROID) 50 MCG TABLET    Take 1 tablet by mouth Daily    LISINOPRIL (PRINIVIL;ZESTRIL) 20 MG TABLET    Take 1 tablet by mouth daily    MAGNESIUM OXIDE (MAG-OX) 400 MG TABLET    Take 400 mg by mouth 2 times daily    MECLIZINE (ANTIVERT) 25 MG CHEW    Take 25 mg by mouth 3 times daily as needed (as needed for dizziness)    METOPROLOL SUCCINATE (TOPROL XL) 50 MG EXTENDED RELEASE TABLET    Take 2 tablets by mouth in the morning and at bedtime    NORTRIPTYLINE (PAMELOR) 10 MG CAPSULE    Take 10 mg by mouth nightly Take 2 caps PO QD    PANTOPRAZOLE (PROTONIX) 40 MG TABLET    Take 1 tablet by mouth every morning (before breakfast)    RIVAROXABAN (XARELTO) 20 MG TABS TABLET    Take 1 tablet by mouth daily (with breakfast)       Hospital medications:  Scheduled Meds:   sodium chloride  1,000 mL IntraVENous Once     Continuous Infusions:  PRN Meds:  Objective   ED TRIAGE VITALS  BP: (!) 92/40, Temp: 98.1 °F (36.7 °C), Heart Rate: 70, Resp: 17, SpO2: 91 %  [place] Coma Scale  Eye Opening: Spontaneous  Best Verbal Response: Oriented  Best Motor Response: Obeys commands  [place] Coma Scale Score: 15  Results for orders placed or performed during the hospital encounter of 03/12/23   CBC Auto Differential   Result Value Ref Range    WBC 17.2 (H) 4.8 - 10.8 thou/mm3    RBC 3.85 (L) 4.20 - 5.40 mill/mm3    Hemoglobin 10.6 (L) 12.0 - 16.0 gm/dl    Hematocrit 34.3 (L) 37.0 - 47.0 %    MCV 89.1 81.0 - 99.0 fL    MCH 27.5 26.0 - 33.0 pg    MCHC 30.9 (L) 32.2 - 35.5 gm/dl    RDW-CV 14.5 11.5 - 14.5 %    RDW-SD 46.9 (H) 35.0 - 45.0 fL    Platelets 674 891 - 354 thou/mm3    MPV 9.1 (L) 9.4 - 12.4 fL    Seg Neutrophils 70.5 %    Lymphocytes 21.6 %    Monocytes 5.9 %    Eosinophils 1.0 %    Basophils 0.5 %    Immature Granulocytes 0.5 %    Segs Absolute 12.1 (H) 1.8 - 7.7 thou/mm3    Lymphocytes Absolute 3.7 1.0 - 4.8 thou/mm3    Monocytes Absolute 1.0 0.4 - 1.3 thou/mm3    Eosinophils Absolute 0.2 0.0 - 0.4 thou/mm3    Basophils Absolute 0.1 0.0 - 0.1 thou/mm3    Immature Grans (Abs) 0.08 (H) 0.00 - 0.07 thou/mm3    nRBC 0 /100 wbc   Comprehensive Metabolic Panel w/ Reflex to MG   Result Value Ref Range    Glucose 123 (H) 70 - 108 mg/dL    Creatinine 1.1 0.4 - 1.2 mg/dL    BUN 21 7 - 22 mg/dL    Sodium 141 135 - 145 meq/L    Potassium reflex Magnesium 5.4 (H) 3.5 - 5.2 meq/L    Chloride 103 98 - 111 meq/L    CO2 24 23 - 33 meq/L    Calcium 9.0 8.5 - 10.5 mg/dL    AST 19 5 - 40 U/L    Alkaline Phosphatase 70 38 - 126 U/L    Total Protein 6.7 6.1 - 8.0 g/dL    Albumin 3.8 3.5 - 5.1 g/dL    Total Bilirubin 0.3 0.3 - 1.2 mg/dL    ALT 13 11 - 66 U/L   Lipase   Result Value Ref Range    Lipase 86.0 (H) 5.6 - 51.3 U/L   Troponin   Result Value Ref Range    Troponin T 0.038 (A) ng/ml   Protime-INR   Result Value Ref Range    INR 3.72 (H) 0.85 - 1.13   Anion Gap   Result Value Ref Range    Anion Gap 14.0 8.0 - 16.0 meq/L   Glomerular Filtration Rate, Estimated   Result Value Ref Range    Est, Glom Filt Rate 53 (A) >60 ml/min/1.73m2   Osmolality   Result Value Ref Range    Osmolality Calc 285.6 275.0 - 300.0 mOsmol/kg   POCT Glucose   Result Value Ref Range    POC Glucose 123 (H) 70 - 108 mg/dl   EKG 12 Lead   Result Value Ref Range    Ventricular Rate 77 BPM    Atrial Rate 77 BPM    P-R Interval 318 ms    QRS Duration 102 ms    Q-T Interval 414 ms    QTc Calculation (Bazett) 468 ms    P Axis -2 degrees    R Axis 38 degrees    T Axis 70 degrees   TYPE AND SCREEN   Result Value Ref Range    ABO B     Rh Factor NEG     Antibody Screen NEG        Physical Exam:  Patient Vitals for the past 24 hrs:   BP Temp Pulse Resp SpO2   03/12/23 1357 (!) 92/40 -- 70 17 91 %   03/12/23 1338 (!) 92/49 -- 68 15 93 %   03/12/23 1304 (!) 108/51 98.1 °F (36.7 °C) 78 18 98 %     Primary Assessment:  Airway: Patent, trachea midline  Breathing: Breath sounds present and equal bilaterally, spontaneous, and unlabored  Circulation: Hemodynamically stable, 2+ central and peripheral pulses. Disability: FANG x 4, following commands. GCS =15    Secondary Assessment:  General: Alert, NAD. Head: Normocephalic, mid face stable. Tympanic membranes intact. Nares patent bilaterally, dried blood noted to the left nares. Ecchymosis noted to the left cheek, eye and eyebrow. Mouth clear of foreign bodies, no lacerations or abrasions. Eyes: PERRLA. EOMI. Mild left periorbital edema and ecchymosis. Neurologic: A & O x3. Following commands. CN 2-12 intact  Neck:  trachea midline. Cervical spines NTTP midline, without step-offs, crepitus or deformity. Back:TL spines are NTTP midline, without step-offs, crepitus or deformity. No abrasions, contusions, or ecchymosis noted. Lungs: Clear to auscultation bilaterally. Chest Wall: Chest rise symmetrical.  Chest wall without tenderness to palpation. No crepitus, deformities, lacerations, or abrasions. Heart: RRR. Normal S1/S2. No obvious M/G/R. Abdomen:  Soft, NTTP. No guarding. Non-peritoneal.  Pelvis:  NTTP, stable to compression. Femoral pulses 2+. GI/: No blood at the urinary meatus. No gross hematuria. Extremities: No gross deformities. PMS intact. Radial /DP/PT pulses 2+ bilaterally. Skin: Skin warm and dry. Normal for ethnicity. Radiology:     CT HEAD WO CONTRAST   Final Result   1. No mass effect or acute hemorrhage. 2. Chronic periventricular small vessel ischemic changes and cerebral atrophy. **This report has been created using voice recognition software. It may contain minor errors which are inherent in voice recognition technology. **      Final report electronically signed by Dr. Carin Gracia on 3/12/2023 2:06 PM      CT FACIAL BONES WO CONTRAST   Final Result    No evidence of acute osseous injury of the face. **This report has been created using voice recognition software. It may contain minor errors which are inherent in voice recognition technology. **      Final report electronically signed by Dr. Eduardo Winkler MD on 3/12/2023 2:06 PM      CT CERVICAL SPINE WO CONTRAST   Final Result    No evidence of acute osseous injury of the cervical spine. **This report has been created using voice recognition software. It may contain minor errors which are inherent in voice recognition technology. **      Final report electronically signed by Dr. Eduardo Winkler MD on 3/12/2023 2:08 PM      XR CHEST PORTABLE   Final Result      No acute intrathoracic process. **This report has been created using voice recognition software. It may contain minor errors which are inherent in voice recognition technology. **      Final report electronically signed by Dr. Tarun Baugh on 3/12/2023 1:23 PM        Fast Exam: Yes - negative for free fluid       25 Minutes spent in patient care collectively between subjective/objective examination, chart review, documentation, clinical reasoning and discussion with attending regarding plan/interval changes. Patient seen and examined independently by me 3/12/2023     I personally supervised the PA/NP in the evaluation, management and development of the treatment plan for Srini Gonzalez  on the same date of service as above. I personally interviewed Srini Gonzalez   and  discussed his review of symptoms as able due to the patient's condition, as well as performed an individual physical exam on the same   date of service as above. In addition I discussed the patient's condition and treatment options with the patient, if able, and/or designated family if available. I have also reviewed and agree with the past medical,  family and social history updates as well as care plans unless otherwise noted below. All questions were answered.       I examined independently and reviewed relevant data myself and may have done so in the context of team rounds. A full chart review was performed by me. I attest that this medical record entry accurately reflects signatures and notations that I made in my capacity as an M. D. when I treated and diagnosed Blanca Moseley on the date of service above     I was responsible for all medical decision making involving this encounter. I identified and/or confirmed all problems associated with this patient encounter by my own direct physical examination of this patient and review of all radiology studies and labwork  that were ordered and available. There are no active hospital problems to display for this patient. I  discussed the management of all of the identified problems with the APN or PA. I formulated the treatment plan for all identified problems and discussed those with the APN or PA . This management plan was then carried out and the patient's orders for care by the APN or PA. Total time personally spent on this patient encounter was 40 minutes which includes :  Preparing to see the patient( reviewing tests and chart)  Obtaining and reviewing separately obtained history  Performing a medically appropriate examination and evaluation  Ordering medications, tests, or procedures  Counseling and educating the patient/family/caregiver  Care coordination  Referring and communicating with other healthcare professionals  Documenting clinical information in the EHR  Independent interpretation of results and communicating the results to patient and care team  This includes a direct physical exam as well as all the other encounter activities described above. Time may be discontiguous. Time does not include procedures. Please see our orders that were directed and approved by me if there are any new ones for the updated patient care plan.     Above discussed and I agree with documentation and orders placed by Joseph Lange CNP    See any additional comments if needed below for any other updated orders and plans. Patient seen and examined independently by me. Above discussed and I agree with CNP. Labs, cultures, and radiographs where available were reviewed. See orders for the updated patient care plan. Renato Kraus MD MD, was a level 2 trauma consult time called was 1:02 PM time seen was 319 PM.  79-year-old female on Xarelto who just recently been discharged from the hospital that is been having apparently some significant weakness and falls. She fell in her bathroom actually last night uncertain if she had a syncopal episode or just weak. Ecchymoses about the left periorbital area patient was otherwise awake and alert. Up with CT scan of the head face and neck was negative. As exam was negative did not feel necessary to proceed with CT of the chest abdomen pelvis. No evidence of any traumatic injuries other than the soft tissue ecchymoses.   Okay to be admitted by medicine if felt to be needed  3/12/2023   4:10 PM    Electronically signed by GUICHO Santiago CNP on 3/12/2023 at 2:14 PM

## 2023-03-12 NOTE — ED NOTES
ED to inpatient nurses report    Chief Complaint   Patient presents with    Fall    Head Injury             Present to ED from home  LOC: alert and orientated to name, place, date  Vital signs   Vitals:    03/12/23 1357 03/12/23 1518 03/12/23 1546 03/12/23 1709   BP: (!) 92/40 (!) 98/49 (!) 98/50 (!) 97/52   Pulse: 70 69 70 74   Resp: 17 16 12 18   Temp:       SpO2: 91% 100% 100% 99%      Oxygen Baseline 96% RA    Current needs required Room Air Bipap/Cpap No  LDAs:   Peripheral IV 03/12/23 Right Antecubital (Active)   Site Assessment Clean, dry & intact 03/12/23 1306   Line Status Blood return noted; Flushed 03/12/23 1306   Phlebitis Assessment No symptoms 03/12/23 1306   Infiltration Assessment 0 03/12/23 1306   Dressing Status Clean, dry & intact 03/12/23 1306     Mobility: Requires assistance * 1  Pending ED orders: None  Present condition: Stable    C-SSRS Risk of Suicide: No Risk  Swallow Screening    Preferred Language: [place]     Electronically signed by Dawn Johnson RN on 3/12/2023 at 5:10 PM     Dawn Johnson RN  03/12/23 3610

## 2023-03-12 NOTE — H&P
Internal Medicine Resident History and Physical          Patient: Anil Dominique  : 1950  MRN: [id number]     Acct: [de-identified]    PCP: Donovan Anne  Date of Admission: 3/12/2023  Date of Service: Pt seen/examined on 23  and Admitted to Inpatient with expected LOS greater than two midnights due to medical therapy. Hospital Problems             Last Modified POA    * (Principal) Accident due to mechanical fall without injury, initial encounter 3/12/2023 Yes       Assessment and Plan:  Mechanical fall: Likely 2/2 below diarrhea, polypharmacy  Diarrhea: Patient reports 2-week span of liquid stools up to 10 times per day. Per son is very malodorous. Given 2 L NS bolus in ED  -C. difficile toxin  -Continue 100 mL LR/hour  Polypharmacy: Patient is on a number of beers list medications including Clonazepam 2 mg tid  -We will change patient to clonazepam 1 mg tid and monitor for symptoms. Hypotension: Patient given 2 L NS in ED. Placed on 100 mL LR/hour. Holding lisinopril 20 mg qd at this time. We will change Toprol XL to 50 mg bid  Paroxysmal A-fib: [id number] 5 patient currently in paced rhythm.  -Continue home Xarelto  -Continue flecainide 50 mg bid, Toprol XL 50 mg bid  HTN: Patient currently hypotensive. Toprol XL 50 mg bid, will currently hold lisinopril 20 mg qd  Troponinemia: Troponin on admit 0.038. Likely 2/2 hypoperfusion 2/2 above diarrhea. Patient given bolus in ED. -Recheck troponin  Controlled DM2: A1c 2023 8.7. On Amaryl 2 mg, Janumet .  -We will hold home meds  -Low-dose SSI  -POC glucose 4x/day, hypoglycemia protocol  Hypothyroid: Synthroid 50 mcg 1 qd  Fibromyalgia: Continue patient's home gabapentin 600 mg qid  GERD: Continue patient's home Protonix 40 mg 1 qam ac.   Chronic Conditions (reviewed and stable unless otherwise stated)     =======================================================================      Chief Complaint: Mechanical fall    History Of Present Illness:  Evans Liao is a 67 y.o. female with PMHx of A-fib with RVR, DM 2, PPM, COVID, fibromyalgia, GERD, who presents to [address] [place] [address] with CC multiple falls with weakness and dizziness. Patient states that she has been weak and tired since her last discharge from the hospital. She was just discharged 2/27/2023 following COVID, sepsis, and atrial fibrillation with RVR. She says that her weakness though has lasted for a number of months. She reports a history of the last few weeks of feeling dizzy. She states that dizziness does not seem to depend on laying down, standing, etc. son says that mother will be walking around normally doing fine and then take her pills and she suddenly acts like she is drunk stumbling and falling. Son also reports some confusion. He states that she had an episode of trying to go to [facility] and ended up in [address] [place] [address]. Patient reports approximately 2 weeks of diarrhea following her recent discharge. She states she has been having up to 10 bowel movements a day. She also reports some fecal incontinence. Also seems to have been taking clonazepam about 6 mg a day for \"years\". Denies abdominal pain, headache, chest pain, shortness of breath, hematochezia, hematuria, dysuria, fever, palpitations,    ED course: In ED patient had some soft BPs. On admit BP was 108/51. Next measurement 92/49. Most BPs were in upper 90s. CT abdomen and pelvis have found no acute findings. FAST exam showed no fluid.     Past Medical History:        Diagnosis Date    Anxiety     Arthritis     Atrial fibrillation (HCC)     Fibromyalgia     GERD (gastroesophageal reflux disease)     Hyperlipidemia     Hypertension     Hypothyroidism     Medtronic dual pacer  1/18/2023    Neuropathy     arms and legs    Osteoporosis     Type 2 diabetes mellitus without complication [facility])        Past Surgical History:        Procedure Laterality Date    APPENDECTOMY      BLADDER SURGERY nerve block placed per pt x 3    BLADDER SUSPENSION      CARPAL TUNNEL RELEASE Right     CHOLECYSTECTOMY      COLONOSCOPY  2010, 2013    CYST REMOVAL      HYSTERECTOMY (CERVIX STATUS UNKNOWN)      PACEMAKER INSERTION  09/2020    Dr Patricia Feliz ENDOSCOPY  [phone]    UPPER GASTROINTESTINAL ENDOSCOPY         Medications Prior to Admission:   Prior to Admission medications    Medication Sig Start Date End Date Taking? Authorizing Provider   lisinopril (PRINIVIL;ZESTRIL) 20 MG tablet Take 1 tablet by mouth daily 3/1/23   Bonifacio Malone MD   metoprolol succinate (TOPROL XL) 50 MG extended release tablet Take 2 tablets by mouth in the morning and at bedtime 2/27/23   Bonifacio Malone MD   clonazePAM (KLONOPIN) 2 MG tablet Take 2 mg by mouth in the morning and 2 mg in the evening. Historical Provider, MD   clonazePAM (KLONOPIN) 2 MG tablet Take 4 mg by mouth nightly.     Historical Provider, MD   divalproex (DEPAKOTE ER) 250 MG extended release tablet Take 250 mg by mouth daily    Historical Provider, MD   meclizine (ANTIVERT) 25 MG CHEW Take 25 mg by mouth 3 times daily as needed (as needed for dizziness)    Historical Provider, MD   nortriptyline (PAMELOR) 10 MG capsule Take 10 mg by mouth nightly Take 2 caps PO QD    Historical Provider, MD   glimepiride (AMARYL) 2 MG tablet Take 2 mg by mouth 3 times daily (before meals)    Historical Provider, MD   magnesium oxide (MAG-OX) 400 MG tablet Take 400 mg by mouth 2 times daily 8/1/18   Historical Provider, MD   flecainide (TAMBOCOR) 50 MG tablet Take 1 tablet by mouth 2 times daily 1/18/23   Edwina Dickerson MD   rivaroxaban (XARELTO) 20 MG TABS tablet Take 1 tablet by mouth daily (with breakfast) 1/3/23   Norma L Hempfling, APRN - CNP   pantoprazole (PROTONIX) 40 MG tablet Take 1 tablet by mouth every morning (before breakfast) 1/3/23   Norma L Hempfling, APRN - CNP   clonazePAM (KLONOPIN) 1 MG tablet Take 1 tablet by mouth 3 times daily as needed for Anxiety for up to 90 days.  Patient taking differently: Take 1 mg by mouth 3 times daily as needed for Anxiety. One in AM two at night. 4/12/21 1/18/23  Reyna Moreno MD   levothyroxine (SYNTHROID) 50 MCG tablet Take 1 tablet by mouth Daily 3/19/21   Reyna Moreno MD   JANUMET  MG per tablet Take 1 tablet by mouth 2 times daily (with meals) 1/7/21   Reyna Moreno MD   JANUMET  MG per tablet Take 1 tablet by mouth 2 times daily (with meals) 12/7/20   Reyna Moreno MD   gabapentin (NEURONTIN) 600 MG tablet Take 1 tablet by mouth 4 times daily for 152 days. One tablet in AM, one tablet at lunch time, 2 tablets at bedtime 5/19/20 1/18/23  Reyna Moreno MD       Allergies:  Adhesive tape, Codeine, and Sulfa antibiotics    Social History:    The patient currently lives [facility].   Tobacco use:   reports that she quit smoking about 34 years ago. Her smoking use included cigarettes. She has a 1.00 pack-year smoking history. She has never used smokeless tobacco.  Alcohol use:   reports current alcohol use.  Drug use:  reports no history of drug use.     Family History:   as follows:      Problem Relation Age of Onset    Arthritis Mother     Miscarriages / Stillbirths Mother     Lung Cancer Mother 85    Hearing Loss Father     Heart Disease Father     High Blood Pressure Father     Cancer Sister 72        throat cancer    Stomach Cancer Sister     Other Brother         throat cancer    Diabetes Paternal Aunt     Other Brother         throat cancer    Other Brother         throat cancer    Uterine Cancer Sister        Review of Systems:   Pertinent positives and negatives as noted in the HPI. Otherwise complete ROS negative.    Physical Exam:    BP (!) 97/52   Pulse 74   Temp 98.1 °F (36.7 °C)   Resp 18   SpO2 99%       General appearance: WD/WN, older woman, laying in bed, in no apparent distress, appears stated age.  Eyes:  Pupils equal, round, and  reactive to light. Conjunctivae/corneas clear. Patient has ecchymosis surrounding left eye. Also on the left forehead and supraorbital ridge. HENT: Head normal in appearance. External nares normal. Oral mucosa moist without lesions. Hearing grossly intact. Neck: Supple, with full range of motion. Trachea midline. No gross JVD appreciated. Respiratory:  Normal respiratory effort. Clear to auscultation, bilaterally without rales or wheezes or rhonchi. Cardiovascular: Normal rate, regular rhythm with normal S1/S2 without murmurs. No lower extremity edema. Abdomen: Soft, non-tender, non-distended with normal bowel sounds. No pain to palpation in any quadrants. Son says she is more distended than usual.  Musculoskeletal: No joint swelling or tenderness. Normal tone. No abnormal movements. Skin: Warm and dry. No rashes or lesions. Ecchymosis on left side of face mostly around left eye and supraorbital ridge. Neurologic:  No focal sensory/motor deficits in the upper and lower extremities. Cranial nerves:  grossly non-focal 2-12. Psychiatric: Alert and oriented, normal insight and thought content. Capillary Refill: Brisk,< 3 seconds. Peripheral Pulses: +2 palpable, equal bilaterally.          Labs:     Recent Labs     03/12/23  1317   WBC 17.2*   HGB 10.6*   HCT 34.3*        Recent Labs     03/12/23  1317      K 5.4*      CO2 24   BUN 21   CREATININE 1.1   CALCIUM 9.0     Recent Labs     03/12/23  1317   AST 19   ALT 13   BILITOT 0.3   ALKPHOS 70     Recent Labs     03/12/23  1317   INR 3.72*     Recent Labs     03/12/23  1317   CKTOTAL 149*     Lab Results   Component Value Date/Time    NITRU NEGATIVE 03/12/2023 03:30 PM    WBCUA 50-75 02/23/2023 09:10 PM    BACTERIA NONE SEEN 02/23/2023 09:10 PM    RBCUA 3-5 02/23/2023 09:10 PM    BLOODU NEGATIVE 03/12/2023 03:30 PM    SPECGRAV 1.016 03/12/2023 03:30 PM    GLUCOSEU NEGATIVE 02/23/2023 09:10 PM         Radiology:     CT ABDOMEN PELVIS W IV CONTRAST Additional Contrast? None   Final Result      No acute intra-abdominal or intrapelvic findings. Final report electronically signed by Dr. Rocky Cruz on 3/12/2023 4:51 PM      CT CHEST W CONTRAST   Final Result      Minimal bilateral lower lung atelectasis/infiltrate. Final report electronically signed by Dr. Rocky Cruz on 3/12/2023 4:49 PM      POC US FAST ABDOMEN LIMITED   Final Result      CT HEAD WO CONTRAST   Final Result   1. No mass effect or acute hemorrhage. 2. Chronic periventricular small vessel ischemic changes and cerebral atrophy. **This report has been created using voice recognition software. It may contain minor errors which are inherent in voice recognition technology. **      Final report electronically signed by Dr. Rocky Cruz on 3/12/2023 2:06 PM      CT FACIAL BONES WO CONTRAST   Final Result    No evidence of acute osseous injury of the face. **This report has been created using voice recognition software. It may contain minor errors which are inherent in voice recognition technology. **      Final report electronically signed by Dr. Alfredo Bragg MD on 3/12/2023 2:06 PM      CT CERVICAL SPINE WO CONTRAST   Final Result    No evidence of acute osseous injury of the cervical spine. **This report has been created using voice recognition software. It may contain minor errors which are inherent in voice recognition technology. **      Final report electronically signed by Dr. Alfredo Bragg MD on 3/12/2023 2:08 PM      XR CHEST PORTABLE   Final Result      No acute intrathoracic process. **This report has been created using voice recognition software. It may contain minor errors which are inherent in voice recognition technology. **      Final report electronically signed by Dr. Rocky Cruz on 3/12/2023 1:23 PM             EKG:  I have reviewed the EKG with the following interpretation: Atrial paced rhythm with prolonged AV conduction. Echo: 1/5/2023  Normal LV size and function EF 55 to 60%. No regional LV wall motion abnormalities and normal wall thickness. Abnormal LV relaxation (grade 1 diastolic dysfunction) LA mildly dilated. Mild mitral regurg. No AS. No atrial, tricuspid, pulmonic regurg. Normal RV size and function    PT/OT Eval Status: will be assessed  Diet: ADULT DIET; Regular  DVT prophylaxis: Anticoagulated on Xarelto  Code Status: Full Code  Disposition: admit to stepdown    Thank you Zain Darby for the opportunity to be involved in this patient's care. Electronically signed by Jose Cruz Jackson DO on 3/12/2023 at 5:54 PM.     Case discussed with Attending, Dr. Mary Guerra.

## 2023-03-12 NOTE — ED NOTES
Pt transported to  16. Pt in stable condition.  Floor contacted before transport     Ellie Mann  03/12/23 0431

## 2023-03-12 NOTE — ED NOTES
Assumed care of patient at this time. Maisie Fothergill, CNP at bedside with Trauma Services.      Laura Tay RN  03/12/23 [id number]

## 2023-03-13 PROBLEM — R29.6 FREQUENT FALLS: Status: ACTIVE | Noted: 2023-03-13

## 2023-03-13 LAB
AMPHETAMINES UR QL SCN: NEGATIVE
ANION GAP SERPL CALC-SCNC: 10 MEQ/L (ref 8–16)
BARBITURATES UR QL SCN: NEGATIVE
BENZODIAZ UR QL SCN: POSITIVE
BUN SERPL-MCNC: 15 MG/DL (ref 7–22)
BZE UR QL SCN: NEGATIVE
C DIFF GDH STL QL: NEGATIVE
CA-I BLD ISE-SCNC: 1.15 MMOL/L (ref 1.12–1.32)
CALCIUM SERPL-MCNC: 8.1 MG/DL (ref 8.5–10.5)
CANNABINOIDS UR QL SCN: NEGATIVE
CHLORIDE SERPL-SCNC: 106 MEQ/L (ref 98–111)
CO2 SERPL-SCNC: 26 MEQ/L (ref 23–33)
CREAT SERPL-MCNC: 0.8 MG/DL (ref 0.4–1.2)
DEPRECATED RDW RBC AUTO: 47.3 FL (ref 35–45)
ERYTHROCYTE [DISTWIDTH] IN BLOOD BY AUTOMATED COUNT: 14.6 % (ref 11.5–14.5)
FENTANYL: NEGATIVE
FERRITIN SERPL IA-MCNC: 49 NG/ML (ref 10–291)
FOLATE SERPL-MCNC: 7 NG/ML (ref 4.8–24.2)
GFR SERPL CREATININE-BSD FRML MDRD: > 60 ML/MIN/1.73M2
GLUCOSE BLD STRIP.AUTO-MCNC: 121 MG/DL (ref 70–108)
GLUCOSE BLD STRIP.AUTO-MCNC: 148 MG/DL (ref 70–108)
GLUCOSE BLD STRIP.AUTO-MCNC: 220 MG/DL (ref 70–108)
GLUCOSE BLD STRIP.AUTO-MCNC: 276 MG/DL (ref 70–108)
GLUCOSE SERPL-MCNC: 99 MG/DL (ref 70–108)
HCT VFR BLD AUTO: 28.1 % (ref 37–47)
HGB BLD-MCNC: 8.7 GM/DL (ref 12–16)
IRON SATN MFR SERPL: 17 % (ref 20–50)
IRON SERPL-MCNC: 39 UG/DL (ref 50–170)
MCH RBC QN AUTO: 27.7 PG (ref 26–33)
MCHC RBC AUTO-ENTMCNC: 31 GM/DL (ref 32.2–35.5)
MCV RBC AUTO: 89.5 FL (ref 81–99)
OPIATES UR QL SCN: NEGATIVE
OXYCODONE: NEGATIVE
PCP UR QL SCN: NEGATIVE
PLATELET # BLD AUTO: 237 THOU/MM3 (ref 130–400)
PMV BLD AUTO: 9.5 FL (ref 9.4–12.4)
POTASSIUM SERPL-SCNC: 4.9 MEQ/L (ref 3.5–5.2)
RBC # BLD AUTO: 3.14 MILL/MM3 (ref 4.2–5.4)
SODIUM SERPL-SCNC: 142 MEQ/L (ref 135–145)
TIBC SERPL-MCNC: 231 UG/DL (ref 171–450)
VIT B12 SERPL-MCNC: 367 PG/ML (ref 211–911)
WBC # BLD AUTO: 8.5 THOU/MM3 (ref 4.8–10.8)

## 2023-03-13 PROCEDURE — 82607 VITAMIN B-12: CPT

## 2023-03-13 PROCEDURE — 36415 COLL VENOUS BLD VENIPUNCTURE: CPT

## 2023-03-13 PROCEDURE — 6370000000 HC RX 637 (ALT 250 FOR IP): Performed by: STUDENT IN AN ORGANIZED HEALTH CARE EDUCATION/TRAINING PROGRAM

## 2023-03-13 PROCEDURE — 80048 BASIC METABOLIC PNL TOTAL CA: CPT

## 2023-03-13 PROCEDURE — 82330 ASSAY OF CALCIUM: CPT

## 2023-03-13 PROCEDURE — 97530 THERAPEUTIC ACTIVITIES: CPT

## 2023-03-13 PROCEDURE — 83540 ASSAY OF IRON: CPT

## 2023-03-13 PROCEDURE — 97116 GAIT TRAINING THERAPY: CPT

## 2023-03-13 PROCEDURE — 1200000003 HC TELEMETRY R&B

## 2023-03-13 PROCEDURE — 82746 ASSAY OF FOLIC ACID SERUM: CPT

## 2023-03-13 PROCEDURE — 97166 OT EVAL MOD COMPLEX 45 MIN: CPT

## 2023-03-13 PROCEDURE — 85027 COMPLETE CBC AUTOMATED: CPT

## 2023-03-13 PROCEDURE — 82728 ASSAY OF FERRITIN: CPT

## 2023-03-13 PROCEDURE — 97162 PT EVAL MOD COMPLEX 30 MIN: CPT

## 2023-03-13 PROCEDURE — 2580000003 HC RX 258: Performed by: STUDENT IN AN ORGANIZED HEALTH CARE EDUCATION/TRAINING PROGRAM

## 2023-03-13 PROCEDURE — 82948 REAGENT STRIP/BLOOD GLUCOSE: CPT

## 2023-03-13 PROCEDURE — 83550 IRON BINDING TEST: CPT

## 2023-03-13 RX ADMIN — CLONAZEPAM 1 MG: 1 TABLET ORAL at 08:46

## 2023-03-13 RX ADMIN — SODIUM CHLORIDE, POTASSIUM CHLORIDE, SODIUM LACTATE AND CALCIUM CHLORIDE: 600; 310; 30; 20 INJECTION, SOLUTION INTRAVENOUS at 04:10

## 2023-03-13 RX ADMIN — PANTOPRAZOLE SODIUM 40 MG: 40 TABLET, DELAYED RELEASE ORAL at 08:46

## 2023-03-13 RX ADMIN — NORTRIPTYLINE HYDROCHLORIDE 10 MG: 10 CAPSULE ORAL at 21:10

## 2023-03-13 RX ADMIN — CLONAZEPAM 1 MG: 1 TABLET ORAL at 21:10

## 2023-03-13 RX ADMIN — RIVAROXABAN 20 MG: 20 TABLET, FILM COATED ORAL at 08:47

## 2023-03-13 RX ADMIN — Medication 400 MG: at 08:46

## 2023-03-13 RX ADMIN — Medication 400 MG: at 21:10

## 2023-03-13 RX ADMIN — METOPROLOL SUCCINATE 50 MG: 50 TABLET, EXTENDED RELEASE ORAL at 21:10

## 2023-03-13 RX ADMIN — SODIUM CHLORIDE, PRESERVATIVE FREE 10 ML: 5 INJECTION INTRAVENOUS at 21:09

## 2023-03-13 RX ADMIN — METOPROLOL SUCCINATE 50 MG: 50 TABLET, EXTENDED RELEASE ORAL at 08:47

## 2023-03-13 RX ADMIN — LEVOTHYROXINE SODIUM 50 MCG: 50 TABLET ORAL at 08:47

## 2023-03-13 RX ADMIN — FLECAINIDE ACETATE 50 MG: 50 TABLET ORAL at 21:09

## 2023-03-13 RX ADMIN — FLECAINIDE ACETATE 50 MG: 50 TABLET ORAL at 08:46

## 2023-03-13 RX ADMIN — INSULIN LISPRO 2 UNITS: 100 INJECTION, SOLUTION INTRAVENOUS; SUBCUTANEOUS at 11:33

## 2023-03-13 RX ADMIN — CLONAZEPAM 1 MG: 1 TABLET ORAL at 15:30

## 2023-03-13 NOTE — PROGRESS NOTES
[address] [facility] PHYSICAL THERAPY  EVALUATION  [facility] ICU STEPDOWN TELEMETRY 4K - 4K-16/016-A    Time In: 2737  Time Out: 1310  Timed Code Treatment Minutes: 23 Minutes  Minutes: 33          Date: 3/13/2023  Patient Name: Silas Schmidt,  Gender:  female        MRN: [id number]  : 1950  (67 y.o.)      Referring Practitioner: Neymar Howell DO  Diagnosis: elevated troponin  Additional Pertinent Hx: Per h&p 3/13: Olga Brice is a 67 y.o. female with PMHx of A-fib with RVR, who presents to [address] [place] [address] with CC multiple falls with weakness and dizziness. Patient states that she has been weak and tired since her last discharge from the hospital. She was just discharged 2023 following COVID, sepsis, and atrial fibrillation with RVR. She reports a history of the last few weeks of feeling dizzy. Son also reports some confusion. Pt presents with a fall with CT negative for acute injury, Afib, polypharmacy and hypotension. Restrictions/Precautions:  Restrictions/Precautions: Fall Risk, General Precautions    Subjective:  Chart Reviewed: Yes  Patient assessed for rehabilitation services?: Yes  Family / Caregiver Present: Yes (son)  Subjective: RN approved session. Pt pleasantly agrees for session, her son present and supportive. Pt and her family receptive to PT recommendations, they wish to speak to the  regarding options for a therapy stay. She states she wants to go to [facility] in a few months for vacation. Pt and her son, along with the care team aware of PT recommendation for increased assist, continued PT and use of AD.     General:  Overall Orientation Status: Within Functional Limits  Vision: Impaired  Vision Exceptions: Wears glasses at all times  Hearing: Within functional limits     Pain: 0/10: denies    Vitals: Vitals not assessed per clinical judgement, see nursing flowsheet    Social/Functional History:    Lives With: Alone  Type of Home: [facility] Layout: One level  Home Access: Stairs to enter with rails  Entrance Stairs - Number of Steps: 2  Entrance Stairs - Rails: Left  Home Equipment: Rollator     Bathroom Shower/Tub: Tub/Shower unit, Walk-in shower  Bathroom Toilet: Standard  Bathroom Equipment: Shower chair  Bathroom Accessibility: Walker accessible    Receives Help From: Family, Home health  ADL Assistance: Independent  Homemaking Assistance: Independent  Ambulation Assistance: Independent  Transfer Assistance: Independent    Active : Yes  Occupation: Retired  Additional Comments: patient started using a 4 w/w after most recent hospitalization. patient used no AD 3-4 months ago. OBJECTIVE:  Range of Motion:  Bilateral Lower Extremity: WFL    Strength:  Bilateral Lower Extremity: WFL  Hip flexion 4/5, knee extension 4+/5, knee flexion 4+/5, DF 4+/5, PF 4+/5    Balance:  Static Sitting Balance:  Stand By Assistance  Dynamic Sitting Balance: Stand By Assistance  Static Standing Balance: Contact Guard Assistance  Dynamic Standing Balance: Minimal Assistance    Min A to stabilize with loss of balance with NBOS x2 times, likely has a high risk of a fall     Bed Mobility:  Not Tested    Transfers:  Sit to Stand: Contact Guard Assistance, Minimal Assistance, with increased time for completion  Stand to [place] [address], with increased time for completion    Pt required min A x1 trial for safety and to stabilize a loss in stability, she required cues for safe technique    Discussed safety with 4ww breaks     Ambulation:  Contact Guard Assistance, Minimal Assistance, with cues for safety, with increased time for completion  Distance: ~35'  Surface: Level Tile  Device:Rolling Walker and w/c follow  Gait Deviations:   Forward Flexed Posture, Slow Tara, Decreased Step Length Bilaterally, Decreased Heel Strike Bilaterally, Mild Path Deviations, Unsteady Gait, and Decreased Terminal Knee Extension    CGA provides for stability and improved posture. Cues to maintain steps with RW and for prompting change in direction and path finding. Pt noted to take very wide turns, have delay in change in direction, and require increased time and cues to prompt change in direction. Pt required min A for stability when prompted to scan her head to the L and R in her environment     Stairs:  [address], Minimal Assistance, with cues for safety, with increased time for completion  Number of Steps: 4  Height: 6\" step with One Handrail    Cues to complete with nonreciprocal pattern. Min A to prompt change in direction and support slow lowering on descent    Educate on need for hands on assist and use of gait belt for safety     Exercise:none today     Functional Outcome Measures: Completed  AM-PAC Inpatient Mobility without Stair Climbing Raw Score : 15  AM-PAC Inpatient without Stair Climbing T-Scale Score : 43.03    ASSESSMENT:  Activity Tolerance:  Patient tolerance of  treatment: good. Pt tolerated mobility well, required CGA to min A for safety along with cues. Noted high fall risk and unsteadiness. She would benefit from a therapy stay if receptive to progress her safe mobility. Pt, RN and her family aware of PT concern with her safe ability to return home. Treatment Initiated: Treatment and education initiated within context of evaluation. Evaluation time included review of current medical information, gathering information related to past medical, social and functional history, completion of standardized testing, formal and informal observation of tasks, assessment of data and development of plan of care and goals. Treatment time included skilled education and facilitation of tasks to increase safety and independence with functional mobility for improved independence and quality of life. Assessment:   Body Structures, Functions, Activity Limitations Requiring Skilled Therapeutic Intervention: Decreased functional mobility , Decreased ROM, Decreased strength, Decreased safe awareness, Decreased endurance, Decreased balance  Assessment: This patient is a 67 y.o. who presents with elevated troponin. This is a decline from the patient's baseline status of mod I with 4ww and living alone with a history of several falls. The patient is observed to have deficits in strength, balance, activity tolerance, and safety awareness and would benefit from skilled PT services to progress functional mobility, safety awareness, and to decrease overall risk of falls. Pt would greatly benefit from a therapy stay to progress her safe mobility. Therapy Prognosis: Good    Requires PT Follow-Up: Yes    Discharge Recommendations:  Discharge Recommendations: Continue to assess pending progress, 24 hour supervision or assist, Therapy recommended at discharge (inpatient therapy stay)    Patient Education:      .     Patient Education  Education Given To: Patient, Family  Education Provided: Role of Therapy, Plan of Care, Fall Prevention Strategies, Transfer Training, Equipment  Education Outcome: Verbalized understanding, Continued education needed       Equipment Recommendations:  Equipment Needed: Yes (she would benefit from use of RW if receptive)    Plan:  Current Treatment Recommendations: Strengthening, Balance training, Functional mobility training, Transfer training, Stair training, Gait training, Endurance training, Neuromuscular re-education, Patient/Caregiver education & training, Safety education & training, Home exercise program, Equipment evaluation, education, & procurement, Therapeutic activities  General Plan:  (5x GM)    Goals:  Patient Goals : none stated  Short Term Goals  Time Frame for Short Term Goals: by hospital d/c  Short Term Goal 1: Pt to complete supine <->sit indep for ease getting in bed  Short Term Goal 2: Pt to complete sit <->Stand with RW and S for safe transfers  Short Term Goal 3: Pt to ambulate =40' with RW for safe mobility in her environment with SBA  Short Term Goal 4: Pt to ascend/descend 3 steps with uni rail and CGA for safe home entry  Long Term Goals  Time Frame for Long Term Goals : NA due to short ELOS    Following session, patient left in safe position with all fall risk precautions in place.

## 2023-03-13 NOTE — PROGRESS NOTES
[facility] STEPDOWN TELEMETRY 4K  EVALUATION    Time:   Time In: 6399  Time Out: 0900  Timed Code Treatment Minutes: 14 Minutes  Minutes: 23          Date: 3/13/2023  Patient Name: Olimpia Roman,   Gender: female      MRN: [id number]  : 1950  (67 y.o.)  Referring Practitioner: Kianna Vidal DO  Diagnosis: accident due to mechanical fall without injury  Additional Pertinent Hx: per chart review; Olimpia Roman is a 67 y.o. female with PMHx of A-fib with RVR, DM 2, PPM, COVID, fibromyalgia, GERD, who presents to [address] [place] [address] with CC multiple falls with weakness and dizziness. Patient states that she has been weak and tired since her last discharge from the hospital. She was just discharged 2023 following COVID, sepsis, and atrial fibrillation with RVR. She says that her weakness though has lasted for a number of months. She reports a history of the last few weeks of feeling dizzy. She states that dizziness does not seem to depend on laying down, standing, etc. son says that mother will be walking around normally doing fine and then take her pills and she suddenly acts like she is drunk stumbling and falling. Son also reports some confusion. He states that she had an episode of trying to go to [facility] and ended up in [address] [place] [address]. Patient reports approximately 2 weeks of diarrhea following her recent discharge. She states she has been having up to 10 bowel movements a day. She also reports some fecal incontinence. Also seems to have been taking clonazepam about 6 mg a day for \"years\".   Denies abdominal pain, headache, chest pain, shortness of breath, hematochezia, hematuria, dysuria, fever, palpitations,    Restrictions/Precautions:  Restrictions/Precautions: Fall Risk, General Precautions    Subjective  Chart Reviewed: Yes, Orders, Progress Notes, History and Physical, Imaging  Patient assessed for rehabilitation services?: Yes  Family / Caregiver Present: No    Subjective: RN approved session, patient supine in bed with head elevated and finishing breakfast. patient agreeable to OT eval. patient A & O x 4, however thought it was evening instead of morning -her breakfast consisted of pancakes. patient had (B) black eyes. Pain: 0/10:     Vitals: Vitals not assessed per clinical judgement, see nursing flowsheet  Nurse checked vitals prior to session    Social/Functional History:  Lives With: Alone  Type of Home: House  Home Layout: One level  Home Access: Stairs to enter with rails  Entrance Stairs - Number of Steps: 2  Home Equipment: Rollator   Bathroom Shower/Tub: Tub/Shower unit, Walk-in shower  Bathroom Toilet: Standard  Bathroom Equipment: Shower chair  Bathroom Accessibility: Walker accessible    Receives Help From: Family, Home health  ADL Assistance: Independent  Homemaking Assistance: Independent  Ambulation Assistance: Independent  Transfer Assistance: Independent    Active : Yes  Occupation: Retired  Additional Comments: patient started using a 4 w/w after most recent hospitalization. patient used no AD 3-4 months ago. VISION:Corrected    HEARING:   slightly hard of hearing    COGNITION: Slow Processing, Decreased Recall, Decreased Insight, Impaired Memory, Decreased Problem Solving, and Decreased Safety Awareness    RANGE OF MOTION:  Bilateral Upper Extremity:  WFL    STRENGTH:  Bilateral Upper Extremity:  shldr, elbow flex and ext 4/5  (F)    SENSATION:   WFL    ADL:   Footwear Management: Stand By Assistance. To doff/don slipper socks  Patient declined to complete any other ADLs . BALANCE:  Sitting Balance:  Stand By Assistance. Standing Balance: Contact Guard Assistance. With use of 2 w/w for support    BED MOBILITY:  Supine to Sit: Stand By Assistance with increased time to complete and use of bed rail    TRANSFERS:  Sit to Stand:  Contact Guard Assistance.  Cues for hand placement    FUNCTIONAL MOBILITY:  Assistive Device: Rolling Walker  Assist Level:  Contact Guard Assistance. Distance:  from EOB to recliner  Cues for safety and sequencing. Activity Tolerance:  Patient tolerance of  treatment: Fair treatment tolerance      Assessment:  Assessment: patient demo overall de-conditioning,decreased UB strength and variable cognition impacting her ability to safely complete ADLs and functional transfers unsuperivsed and has had multiple falls in home with patient hitting head during most recent fall. patient would benefit from continued, skilled OT to progress toward PLOF, decrease caregiver burden and transition towards least restrictive environment. Performance deficits / Impairments: Decreased functional mobility , Decreased ADL status, Decreased endurance, Decreased strength, Decreased balance, Decreased safe awareness, Decreased cognition  Prognosis: Fair  REQUIRES OT FOLLOW-UP: Yes  Decision Making: Medium Complexity    Treatment Initiated: Treatment and education initiated within context of evaluation. Evaluation time included review of current medical information, gathering information related to past medical, social and functional history, completion of standardized testing, formal and informal observation of tasks, assessment of data and development of plan of care and goals. Treatment time included skilled education and facilitation of tasks to increase safety and independence with ADL's for improved functional independence and quality of life.       Discharge Recommendations:  Continue to assess pending progress, Patient would benefit from continued therapy after discharge, [address] [facility], [address] with OT, 24 hour supervision or assist    Patient Education:     Patient Education  Education Given To: Patient  Education Provided: ADL Adaptive Strategies, Role of Therapy, Fall Prevention Strategies, Transfer Training, Precautions  Barriers to Learning: Cognition    Equipment Recommendations:  Equipment Needed: No    Plan:  Times Per Week: 5x  Times Per Day: Once a day  Current Treatment Recommendations: Strengthening, Neuromuscular re-education, Balance training, Functional mobility training, Endurance training, Safety education & training, Patient/Caregiver education & training, Self-Care / ADL. See long-term goal time frame for expected duration of plan of care. If no long-term goals established, a short length of stay is anticipated. Goals:  Patient goals : return [facility] at [facility]  Short Term Goals  Time Frame for Short Term Goals: by discharge  Short Term Goal 1: patient will tolerate 5 min functional standing with two hand release with SBA to increase activity tolerance for toileting. Short Term Goal 2: patient will functionally ambulate to/from BR with SBA and 0-1 cues for safety and sequencing. Short Term Goal 3: patient will complete ADL routine with SBA. Short Term Goal 4: patient will participate in moderate resistive UB exer to increase UB strength for self care routine. Following session, patient left in safe position with all fall risk precautions in place.

## 2023-03-13 NOTE — PLAN OF CARE
Problem: Discharge Planning  Goal: Discharge to home or other facility with appropriate resources  3/13/2023 1846 by Valencia Saucedo RN  Outcome: Progressing  3/13/2023 1014 by IAN Melton  Outcome: Progressing     Problem: Safety - Adult  Goal: Free from fall injury  Outcome: Progressing

## 2023-03-13 NOTE — PLAN OF CARE
Problem: Discharge Planning  Goal: Discharge to home or other facility with appropriate resources  3/13/2023 1014 by IAN Wing  Outcome: Progressing  SW consult received and completed. See SW note.

## 2023-03-13 NOTE — CARE COORDINATION
03/13/23 1152   Readmission Assessment   Number of Days since last admission? 8-30 days   Previous Disposition Home with Home Health   Who is being Interviewed Patient   What was the patient's/caregiver's perception as to why they think they needed to return back to the hospital? Other (Comment)  (Fall)   Did you visit your Primary Care Physician after you left the hospital, before you returned this time? No   Why weren't you able to visit your PCP? Other (Comment)  (readmitted too soon)   Who advised the patient to return to the hospital? Self-referral   Does the patient report anything that got in the way of taking their medications? No   In our efforts to provide the best possible care to you and others like you, can you think of anything that we could have done to help you after you left the hospital the first time, so that you might not have needed to return so soon?  Other (Comment)  (no)

## 2023-03-13 NOTE — PROGRESS NOTES
Internal Medicine Resident Progress Note    Patient:  Freda Tamez    YOB: 1950  Unit/Bed:4K-16/016-A  MRN: [id number]    Acct: [de-identified]   PCP: Dane Diggs    Date of Admission: 3/12/2023      Assessment/Plan:  Mechanical fall: Likely 2/2 below diarrhea, polypharmacy  Polypharmacy: Patient is on a number of occasions which may be contributing to her symptoms. Among these clonazepam 2 mg tid Toprol- mg bid.  -We will change patient to clonazepam 1 mg tid and monitor for symptoms.  -Will decrease Toprol-XL to 50 mg twice daily  Hypotension: Patient given 2 L NS in ED. Placed on 100 mL LR/hour. Holding lisinopril 20 mg qd at this time. We will change Toprol XL to 50 mg bid. Paroxysmal A-fib: [id number] patient currently in paced rhythm.  -Continue home Xarelto. -Continue flecainide 50 mg bid, Toprol XL 50 mg bid  HTN: Patient currently hypotensive. Toprol XL 50 mg bid, will currently hold lisinopril 20 mg qd. Troponinemia: Troponin on admit 0.038. Likely 2/2 hypoperfusion 2/2 above diarrhea. Patient given bolus in ED. Troponin stable. No concerning changes on EKG. Patient has no chest pain, shortness of breath. Controlled DM2: A1c 1/1/2023 8.7. On Amaryl 2 mg, Janumet .  -We will hold home meds  -Low-dose SSI  -POC glucose 4x/day, hypoglycemia protocol  Hypothyroid: Synthroid 50 mcg 1 qd  Fibromyalgia: Continue patient's home gabapentin 600 mg qid  GERD: Continue patient's home Protonix 40 mg 1 qam ac. Diarrhea-resolved: Patient reports 2-week span of liquid stools up to 10 times per day. Per son is very malodorous. Given 2 L NS bolus in ED. C. difficile toxin negative.     Chronic Conditions (reviewed and stable unless otherwise stated)      Expected discharge date: TBD-soon    Disposition:   [] Home  [] TCU  [] Rehab  [] Psych  [x] SNF  [] [place]  [] Other-    ===================================================================      Chief Complaint: Mechanical fall    Hospital Course: Eboni Kessler is a 67 y.o. female with PMHx of A-fib with RVR, DM 2, PPM, COVID, fibromyalgia, GERD, who presents to [facility] with CC multiple falls with weakness and dizziness. Patient states that she has been weak and tired since her last discharge from the hospital. She was just discharged 2/27/2023 following COVID, sepsis, and atrial fibrillation with RVR. She says that her weakness though has lasted for a number of months. She reports a history of the last few weeks of feeling dizzy. She states that dizziness does not seem to depend on laying down, standing, etc. son says that mother will be walking around normally doing fine and then take her pills and she suddenly acts like she is drunk stumbling and falling. Son also reports some confusion. He states that she had an episode of trying to go to [facility] and ended up in [address] [place] [address]. Patient reports approximately 2 weeks of diarrhea following her recent discharge. She states she has been having up to 10 bowel movements a day. She also reports some fecal incontinence. Also seems to have been taking clonazepam about 6 mg a day for \"years\". Denies abdominal pain, headache, chest pain, shortness of breath, hematochezia, hematuria, dysuria, fever, palpitations,     ED course: In ED patient had some soft BPs. On admit BP was 108/51. Next measurement 92/49. Most BPs were in upper 90s. CT abdomen and pelvis have found no acute findings. FAST exam showed no fluid. Subjective (past 24 hours): Patient says she feels about the same today. She has no complaints. She has agreed to go to rehab following her hospitalization. Patient states diarrhea has ended today. Had well formed stool this morning. ROS: reviewed complete ROS unchanged unless otherwise stated in hospital course/subjective portion.        Medications:  Reviewed    Infusion Medications    sodium chloride      dextrose Scheduled Medications    sodium chloride flush  5-40 mL IntraVENous 2 times per day    clonazePAM  1 mg Oral TID    flecainide  50 mg Oral BID    levothyroxine  50 mcg Oral Daily    magnesium oxide  400 mg Oral BID    metoprolol succinate  50 mg Oral BID    pantoprazole  40 mg Oral QAM AC    rivaroxaban  20 mg Oral Daily with breakfast    nortriptyline  10 mg Oral Nightly    insulin lispro  0-4 Units SubCUTAneous TID WC    insulin lispro  0-4 Units SubCUTAneous Nightly     PRN Meds: sodium chloride flush, sodium chloride, ondansetron **OR** ondansetron, polyethylene glycol, acetaminophen **OR** acetaminophen, glucose, dextrose bolus **OR** dextrose bolus, glucagon (rDNA), dextrose        Intake/Output Summary (Last 24 hours) at 3/13/2023 1631  Last data filed at 3/13/2023 0423  Gross per 24 hour   Intake 977.6 ml   Output 0 ml   Net 977.6 ml       Exam:  BP (!) 110/56   Pulse 71   Temp 98.5 °F (36.9 °C) (Oral)   Resp 16   Ht 5' 4\" (1.626 m)   Wt 137 lb 6.4 oz (62.3 kg)   SpO2 98%   BMI 23.58 kg/m²     General: WD/WN, older woman, sitting on bedside chair, in no distress, appears stated age. Eyes:  PERRL. Conjunctivae/corneas clear. HENT: Head normal appearing. Nares normal. Oral mucosa moist.  Hearing intact. Neck: Supple, with full range of motion. Trachea midline. No gross JVD appreciated. Respiratory:  Normal effort. Clear to auscultation, without rales or wheezes or rhonchi. Cardiovascular: Normal rate, regular rhythm with normal S1/S2 without murmurs. No lower extremity edema. Abdomen: Soft, non-tender, non-distended with normal bowel sounds. Musculoskeletal: No joint swelling or tenderness. Normal tone. No abnormal movements. Skin: Warm and dry. Ecchymosis surrounding left eye and left supraorbital ridge. Also has healing cut on left temple. Neurologic:  No focal sensory/motor deficits in the upper or lower extremities. Cranial nerves:  grossly non-focal 2-12.      Psychiatric: Pleasant, cooperative with exam, alert and oriented, normal insight and thought content. Capillary Refill: Brisk,< 3 seconds. Peripheral Pulses: +2 palpable, equal bilaterally. Labs:   Recent Labs     03/12/23  1317 03/13/23  0451   WBC 17.2* 8.5   HGB 10.6* 8.7*   HCT 34.3* 28.1*    237     Recent Labs     03/12/23  1317 03/13/23  0451    142   K 5.4* 4.9    106   CO2 24 26   BUN 21 15   CREATININE 1.1 0.8   CALCIUM 9.0 8.1*     Recent Labs     03/12/23  1317   AST 19   ALT 13   BILITOT 0.3   ALKPHOS 70     Recent Labs     03/12/23  1317   INR 3.72*     Recent Labs     03/12/23  1317   CKTOTAL 149*     No results for input(s): PROCAL in the last 72 hours. Lab Results   Component Value Date/Time    NITRU NEGATIVE 03/12/2023 03:30 PM    WBCUA 50-75 02/23/2023 09:10 PM    BACTERIA NONE SEEN 02/23/2023 09:10 PM    RBCUA 3-5 02/23/2023 09:10 PM    BLOODU NEGATIVE 03/12/2023 03:30 PM    SPECGRAV 1.016 03/12/2023 03:30 PM    GLUCOSEU NEGATIVE 02/23/2023 09:10 PM       Radiology (48 hours):  CT HEAD WO CONTRAST    Result Date: 3/12/2023  1. No mass effect or acute hemorrhage. 2. Chronic periventricular small vessel ischemic changes and cerebral atrophy. **This report has been created using voice recognition software. It may contain minor errors which are inherent in voice recognition technology. ** Final report electronically signed by Dr. Darek Patino on 3/12/2023 2:06 PM    CT FACIAL BONES WO CONTRAST    Result Date: 3/12/2023   No evidence of acute osseous injury of the face. **This report has been created using voice recognition software. It may contain minor errors which are inherent in voice recognition technology. ** Final report electronically signed by Dr. Fior Peralta MD on 3/12/2023 2:06 PM    CT CHEST W CONTRAST    Result Date: 3/12/2023  Minimal bilateral lower lung atelectasis/infiltrate.  Final report electronically signed by Dr. Darek Patino on 3/12/2023 4:49 PM    CT CERVICAL SPINE WO CONTRAST    Result Date: 3/12/2023   No evidence of acute osseous injury of the cervical spine. **This report has been created using voice recognition software. It may contain minor errors which are inherent in voice recognition technology. ** Final report electronically signed by Dr. Kimberley Gomez MD on 3/12/2023 2:08 PM    CT ABDOMEN PELVIS W IV CONTRAST Additional Contrast? None    Result Date: 3/12/2023  No acute intra-abdominal or intrapelvic findings. Final report electronically signed by Dr. Chung Brasher on 3/12/2023 4:51 PM    XR CHEST PORTABLE    Result Date: 3/12/2023  No acute intrathoracic process. **This report has been created using voice recognition software. It may contain minor errors which are inherent in voice recognition technology. ** Final report electronically signed by Dr. Chung Brasher on 3/12/2023 1:23 PM     Echo: 1/5/2023 EF 55 to 60%. No LV wall motion normal abnormalities normal wall thickness. Grade 1 diastolic dysfunction. DVT prophylaxis:    [] Lovenox  [] SCDs  [] SQ Heparin  [] Encourage ambulation   [x] Already on Anticoagulation-Xarelto       Diet: ADULT DIET;  Regular  Code Status: Full Code  PT/OT: Yes  Tele: Yes  IVF: No    Electronically signed by Jose Cruz Jackson DO on 3/13/2023 at 4:31 PM    Case was discussed with Attending, Dr. Mary Guerra

## 2023-03-13 NOTE — CARE COORDINATION
Case Management Assessment  Initial Evaluation    Date/Time of Evaluation: 3/13/2023 11:58 AM  Assessment Completed by: Roland Whitten RN    If patient is discharged prior to next notation, then this note serves as note for discharge by case management. Patient Name: Felix Macrus                   YOB: 1950  Diagnosis: Elevated troponin [R77.8]  Frequent falls [R29.6]  Contusion of face, initial encounter Maple Owens  Concussion with loss of consciousness, initial encounter [S06.0X9A]  Altered mental status, unspecified altered mental status type [R41.82]  Accident due to mechanical fall without injury, initial encounter [W19. XXXA]                   Date / Time: 3/12/2023  1:00 PM  Location: [address] [place] [address]     Patient Admission Status: Inpatient   Readmission Risk Low 0-14, Mod 15-19), High > 20: Readmission Risk Score: 19    Current PCP: Avi Llanes  PCP verified by CM? Yes    Chart Reviewed: Yes      History Provided by: Patient  Patient Orientation: Alert and Oriented    Patient Cognition: Alert    Hospitalization in the last 30 days (Readmission):  Yes    If yes, Readmission Assessment in CM Navigator will be completed. Advance Directives:      Code Status: Full Code   Patient's Primary Decision Maker is: Legal Next of Kin      Discharge Planning:    Patient lives with: Alone Type of Home: House  Primary Care Giver: Self  Patient Support Systems include: Family Members   Current Financial resources: Medicare  Current community resources: ECF/Home Care  Current services prior to admission: Durable Medical Equipment            Current DME: [facility]            Type of Home Care services:  OT, PT, Skilled Therapy, Nursing Services, Aide Services    ADLS  Prior functional level: Independent in ADLs/IADLs  Current functional level:  Independent in ADLs/IADLs    Family can provide assistance at DC: No  Would you like Case Management to discuss the discharge plan with any other family members/significant others, and if so, who? No  Plans to Return to Present Housing: Yes  Other Identified Issues/Barriers to RETURNING to current housing: yes  Potential Assistance needed at discharge: N/A, Home Care            Potential DME:    Patient expects to discharge to: [address] [place] [address] for transportation at discharge: Family    Financial    Payor: Christina Alvarado / Plan: [address] / Product Type: *No Product type* /     Does insurance require precert for SNF: Yes    Potential assistance Purchasing Medications: No  Meds-to-Beds request: Yes      [facility] - [address], [address]  [address]  Phone: [phone] Fax: [phone]    [address] [phone] - f [phone]  [facility] [address]  Phone: [phone] Fax: [phone]    [address] [place] [address], [address] [place] [address] [phone] [facility] [phone]  [facility] [address]  Phone: [phone] Fax: [phone]      Notes:    Factors facilitating achievement of predicted outcomes: Caregiver support    Barriers to discharge: Limited family support    Additional Case Management Notes:   Trauma/Fall/Diarrhea  History: A-fib, DM  H 8.7, elevated WBC  IVF    The Plan for Transition of Care is related to the following treatment goals of Elevated troponin [R77.8]  Frequent falls [R29.6]  Contusion of face, initial encounter Climmie [place]  Concussion with loss of consciousness, initial encounter [S06.0X9A]  Altered mental status, unspecified altered mental status type [R41.82]  Accident due to mechanical fall without injury, initial encounter [W19. XXXA]    Patient Goals/Plan/Treatment Preferences: lives alone; has [facility] (nsg, therapy, aide); therapy following; has cane, walker; prefers [facility] (precert) v home w family backup plan w [facility] as PTA when medically cleared  Transportation/Food Security/Housekeeping Addressed: No issues identified.      Maryann Leone RN  Case Management Department

## 2023-03-13 NOTE — PLAN OF CARE
Problem: ABCDS Injury Assessment  Goal: Absence of physical injury  Outcome: Progressing  Flowsheets  Taken 3/12/2023 2316 by Kaleb Kumari RN  Absence of Physical Injury: Implement safety measures based on patient assessment  Taken 3/12/2023 1833 by Thony Feng RN  Absence of Physical Injury: Implement safety measures based on patient assessment     Problem: Skin/Tissue Integrity  Goal: Absence of new skin breakdown  Description: 1. Monitor for areas of redness and/or skin breakdown  2. Assess vascular access sites hourly  3. Every 4-6 hours minimum:  Change oxygen saturation probe site  4. Every 4-6 hours:  If on nasal continuous positive airway pressure, respiratory therapy assess nares and determine need for appliance change or resting period. Outcome: Progressing  Note: No new evidence of skin breakdown     Problem: Discharge Planning  Goal: Discharge to home or other facility with appropriate resources  Flowsheets  Taken 3/12/2023 2316 by Kaleb Kumari RN  Discharge to home or other facility with appropriate resources:   Identify barriers to discharge with patient and caregiver   Identify discharge learning needs (meds, wound care, etc)  Taken 3/12/2023 1815 by Oliver Ewing RN  Discharge to home or other facility with appropriate resources:   Identify barriers to discharge with patient and caregiver   Arrange for needed discharge resources and transportation as appropriate   Identify discharge learning needs (meds, wound care, etc)   Refer to discharge planning if patient needs post-hospital services based on physician order or complex needs related to functional status, cognitive ability or social support system     Problem: Safety - Adult  Goal: Free from fall injury  Flowsheets (Taken 3/12/2023 2316)  Free From Fall Injury: Instruct family/caregiver on patient safety   Care plan reviewed with patient. Patient verbalize understanding of the plan of care and contribute to goal setting.

## 2023-03-13 NOTE — CARE COORDINATION
DISCHARGE PLANNING EVALUATION  3/13/23, 10:08 AM EDT    Reason for Referral: Discharge planning  Mental Status: Alert and Oriented  Decision Making: Self  Family/Social/Home Environment: Patient lives alone but has supportive family and friends that can help as needed. Current Services including food security, transportation and housekeeping: Patient is current with [facility]. Patient reported that she is independent at home. Current Equipment: cane  Payment Source: [facility] Medicare  Concerns or Barriers to Discharge: None  Post-acute [facility]) provider list was provided to patient. Patient was informed of their freedom to choose [facility] provider. Discussed and offered to show the patient the relevant [facility] Providers quality and resource use measures on Medicare Compare web site via computer based on patient's goals of care and treatment preferences. Questions regarding selection process were answered. Teach Back Method used with patient regarding care plan and needs. Patient verbalize understanding of the plan of care and contribute to goal setting. Patient goals, treatment preferences and discharge plan: Patient plans to return home with [facility]. She declined ECF at this time. She reported that she will have a ride at discharge. UDAY called [facility] and left a voicemail for [place] asking for a call back. 3/13/23, 1:46 PM EDT  DISCHARGE PLANNING EVALUATION    UDAY spoke with patient and son about discharge planning. UDAY reviewed recommendations of rehab stay after hospital stay. Patient is agreeable to ECF. UDAY provided a list of in network facilities. Patient is asked for referral to [facility]. UDAY called and made referral to [facility].            Electronically signed by IAN Garcia on 3/13/2023 at 10:08 AM

## 2023-03-14 VITALS
HEART RATE: 70 BPM | OXYGEN SATURATION: 99 % | TEMPERATURE: 98.1 F | HEIGHT: 64 IN | SYSTOLIC BLOOD PRESSURE: 112 MMHG | BODY MASS INDEX: 23.93 KG/M2 | RESPIRATION RATE: 17 BRPM | DIASTOLIC BLOOD PRESSURE: 59 MMHG | WEIGHT: 140.2 LBS

## 2023-03-14 PROBLEM — F41.9 ANXIETY: Status: ACTIVE | Noted: 2023-03-14

## 2023-03-14 LAB
ANION GAP SERPL CALC-SCNC: 6 MEQ/L (ref 8–16)
BUN SERPL-MCNC: 12 MG/DL (ref 7–22)
CA-I BLD ISE-SCNC: 1.13 MMOL/L (ref 1.12–1.32)
CALCIUM SERPL-MCNC: 8.4 MG/DL (ref 8.5–10.5)
CHLORIDE SERPL-SCNC: 107 MEQ/L (ref 98–111)
CO2 SERPL-SCNC: 30 MEQ/L (ref 23–33)
CREAT SERPL-MCNC: 0.7 MG/DL (ref 0.4–1.2)
GFR SERPL CREATININE-BSD FRML MDRD: > 60 ML/MIN/1.73M2
GLUCOSE BLD STRIP.AUTO-MCNC: 114 MG/DL (ref 70–108)
GLUCOSE BLD STRIP.AUTO-MCNC: 181 MG/DL (ref 70–108)
GLUCOSE SERPL-MCNC: 100 MG/DL (ref 70–108)
POTASSIUM SERPL-SCNC: 4.8 MEQ/L (ref 3.5–5.2)
SODIUM SERPL-SCNC: 143 MEQ/L (ref 135–145)

## 2023-03-14 PROCEDURE — 80048 BASIC METABOLIC PNL TOTAL CA: CPT

## 2023-03-14 PROCEDURE — 82330 ASSAY OF CALCIUM: CPT

## 2023-03-14 PROCEDURE — 99239 HOSP IP/OBS DSCHRG MGMT >30: CPT | Performed by: STUDENT IN AN ORGANIZED HEALTH CARE EDUCATION/TRAINING PROGRAM

## 2023-03-14 PROCEDURE — 6370000000 HC RX 637 (ALT 250 FOR IP): Performed by: STUDENT IN AN ORGANIZED HEALTH CARE EDUCATION/TRAINING PROGRAM

## 2023-03-14 PROCEDURE — 97530 THERAPEUTIC ACTIVITIES: CPT

## 2023-03-14 PROCEDURE — 97110 THERAPEUTIC EXERCISES: CPT

## 2023-03-14 PROCEDURE — 82948 REAGENT STRIP/BLOOD GLUCOSE: CPT

## 2023-03-14 PROCEDURE — 97535 SELF CARE MNGMENT TRAINING: CPT

## 2023-03-14 PROCEDURE — 97116 GAIT TRAINING THERAPY: CPT

## 2023-03-14 PROCEDURE — 36415 COLL VENOUS BLD VENIPUNCTURE: CPT

## 2023-03-14 RX ORDER — METOPROLOL SUCCINATE 50 MG/1
50 TABLET, EXTENDED RELEASE ORAL 2 TIMES DAILY
Qty: 30 TABLET | Refills: 3 | Status: SHIPPED | OUTPATIENT
Start: 2023-03-14

## 2023-03-14 RX ORDER — FERROUS SULFATE 325(65) MG
325 TABLET ORAL EVERY OTHER DAY
Status: DISCONTINUED | OUTPATIENT
Start: 2023-03-14 | End: 2023-03-14 | Stop reason: HOSPADM

## 2023-03-14 RX ORDER — FOLIC ACID 1 MG/1
1 TABLET ORAL DAILY
Qty: 30 TABLET | Refills: 3 | Status: CANCELLED | OUTPATIENT
Start: 2023-03-15

## 2023-03-14 RX ORDER — CLONAZEPAM 1 MG/1
1 TABLET ORAL 3 TIMES DAILY
Qty: 90 TABLET | Refills: 0 | Status: SHIPPED | OUTPATIENT
Start: 2023-03-14 | End: 2023-04-13

## 2023-03-14 RX ORDER — NORTRIPTYLINE HYDROCHLORIDE 10 MG/1
10 CAPSULE ORAL NIGHTLY
Qty: 30 CAPSULE | Refills: 3 | Status: CANCELLED | OUTPATIENT
Start: 2023-03-14

## 2023-03-14 RX ORDER — METOPROLOL SUCCINATE 50 MG/1
50 TABLET, EXTENDED RELEASE ORAL 2 TIMES DAILY
Qty: 30 TABLET | Refills: 3 | Status: CANCELLED | OUTPATIENT
Start: 2023-03-14

## 2023-03-14 RX ORDER — FOLIC ACID 1 MG/1
1 TABLET ORAL DAILY
Status: DISCONTINUED | OUTPATIENT
Start: 2023-03-14 | End: 2023-03-14 | Stop reason: HOSPADM

## 2023-03-14 RX ORDER — FERROUS SULFATE 325(65) MG
325 TABLET ORAL EVERY OTHER DAY
Qty: 30 TABLET | Refills: 3 | Status: CANCELLED | OUTPATIENT
Start: 2023-03-16

## 2023-03-14 RX ORDER — FERROUS SULFATE 325(65) MG
325 TABLET ORAL EVERY OTHER DAY
Qty: 30 TABLET | Refills: 3 | Status: SHIPPED | OUTPATIENT
Start: 2023-03-16

## 2023-03-14 RX ORDER — FOLIC ACID 1 MG/1
1 TABLET ORAL DAILY
Qty: 30 TABLET | Refills: 3 | Status: SHIPPED | OUTPATIENT
Start: 2023-03-15

## 2023-03-14 RX ADMIN — FERROUS SULFATE TAB 325 MG (65 MG ELEMENTAL FE) 325 MG: 325 (65 FE) TAB at 11:47

## 2023-03-14 RX ADMIN — CLONAZEPAM 1 MG: 1 TABLET ORAL at 08:34

## 2023-03-14 RX ADMIN — PANTOPRAZOLE SODIUM 40 MG: 40 TABLET, DELAYED RELEASE ORAL at 06:29

## 2023-03-14 RX ADMIN — METOPROLOL SUCCINATE 50 MG: 50 TABLET, EXTENDED RELEASE ORAL at 08:34

## 2023-03-14 RX ADMIN — LEVOTHYROXINE SODIUM 50 MCG: 50 TABLET ORAL at 06:29

## 2023-03-14 RX ADMIN — FLECAINIDE ACETATE 50 MG: 50 TABLET ORAL at 08:34

## 2023-03-14 RX ADMIN — RIVAROXABAN 20 MG: 20 TABLET, FILM COATED ORAL at 08:34

## 2023-03-14 RX ADMIN — FOLIC ACID 1 MG: 1 TABLET ORAL at 11:47

## 2023-03-14 RX ADMIN — Medication 400 MG: at 08:34

## 2023-03-14 NOTE — CONSULTS
Physical Medicine & Rehabilitation   Consult Note      Admitting Physician: Jay Coles MD    Primary Care Provider: [facility]     Reason for Consult:  fall without injury. Rehab needs    History of Present Illness:  Nubia Waddell is a 67 y.o. female admitted to [facility] on 3/12/2023. Patient  has a past medical history of Anxiety, Arthritis, Atrial fibrillation ([facility] [address] Fibromyalgia, GERD (gastroesophageal reflux disease), Hyperlipidemia, Hypertension, Hypothyroidism, Medtronic dual pacer , Neuropathy, Osteoporosis, and Type 2 diabetes mellitus without complication ([facility] [address] Patient presented to [facility] ER after suffering multiple falls. Patient with fall at home in the shower, was able to craw out to her bedroom and call her niece. Patient went to follow up with PCP the next day and PCP instructed to go to ER. Patient then fell in the parking lot, family brought her to the ER. Patient was hospitalized 2/23-2/27/23 for COVID and sepsis. Patient was discharge home but states it wasn't going well. Patient was with confusion and balance issues. Patient reported diarrhea with some incontinence, up to 10 times a day, since her discharge. ED course: In ED patient had some soft BPs. On admit BP was 108/51. Next measurement 92/49. Most BPs were in upper 90s. CT abdomen and pelvis have found no acute findings. FAST exam showed no fluid. Patient was admitted for further workup and hypotension management. Patient with resolution of diarrhea. Patient states overall she is feeling pretty well, thinking is clearer now than at home. Patient states she feels the medications were affecting her. Patient with noted depression after her  passed away last year, and around the same time lost her dogs. Patient hopeful moving forward she can be on less medication and process through her loss a little more. Patient feels she is doing better.  Discussed IPR qualifications and her current therapy needs. Explained her insurance and their denial criteria. Patient understanding, she is open to SNF, just unsure where. Current Rehabilitation Assessments:  PT:    Range of Motion:  Bilateral Lower Extremity: WFL  Strength:  Bilateral Lower Extremity: WFL  Hip flexion 4/5, knee extension 4+/5, knee flexion 4+/5, DF 4+/5, PF 4+/5  Balance:  Static Sitting Balance:  Stand By Assistance  Dynamic Sitting Balance: Stand By Assistance  Static Standing Balance: Contact Guard Assistance  Dynamic Standing Balance: Minimal Assistance  Min A to stabilize with loss of balance with NBOS x2 times, likely has a high risk of a fall   Transfers:  Sit to Stand: Contact Guard Assistance, Minimal Assistance, with increased time for completion  Stand to [place] [id number], with increased time for completion  Pt required min A x1 trial for safety and to stabilize a loss in stability, she required cues for safe technique  Discussed safety with 4ww breaks   Ambulation:  Contact Guard Assistance, Minimal Assistance, with cues for safety, with increased time for completion  Distance: ~35'  Surface: Level Tile  Device:Rolling Walker and w/c follow  Gait Deviations: Forward Flexed Posture, Slow Tara, Decreased Step Length Bilaterally, Decreased Heel Strike Bilaterally, Mild Path Deviations, Unsteady Gait, and Decreased Terminal Knee Extension  CGA provides for stability and improved posture. Cues to maintain steps with RW and for prompting change in direction and path finding. Pt noted to take very wide turns, have delay in change in direction, and require increased time and cues to prompt change in direction. Pt required min A for stability when prompted to scan her head to the L and R in her environment   Stairs:  [address], Minimal Assistance, with cues for safety, with increased time for completion  Number of Steps: 4  Height: 6\" step with One Handrail  Cues to complete with nonreciprocal pattern.  Min A to prompt change in direction and support slow lowering on descent  Educate on need for hands on assist and use of gait belt for safety     OT:    ADL:   Grooming: Stand By Assistance. Standing at sink x 3 trials to wash hands  Toileting: Stand By Assistance. x 2 trials  Toilet Transfer: Stand By Assistance. Standard toilet x 2 trials . BALANCE:  Sitting Balance:  Supervision. Standing Balance: Stand By Assistance. Verbal cues for body positioning with dynamic reaching, demonstrating understanding  BED MOBILITY:  Supine to Sit: Supervision, X 1, with head of bed raised, with rail, with increased time for completion    TRANSFERS:  Sit to Stand:  Stand By Assistance, X 1.    Stand to Sit: Stand By Assistance, X 1.    FUNCTIONAL MOBILITY:  Assistive Device: Rolling Walker and None  Assist Level:  Stand By Assistance. Distance: To and from bathroom to and from family nutrition room  Patient able to complete return to room without RW with no LOB   ADDITIONAL ACTIVITIES:  Patient completed simple meal prep activity within family nutrition room to make pb and j sandwich to address balance, safety awareness, increase activity tolerance, problem solving to increase independence with ADLs/IADLs. Patient provided visual/verbal direction and location of 4 items required to complete with patient able to correctly recall location of 1/4 items upon beginning activity requiring moderate verbal/visual cues in order to locate remainder. Patient completed activity without AD within kitchen setting with CGA for safety with verbal cues for body positioning with no LOB. Patient able to tolerate x 10 min activity without AD. Increased time spent with patient regarding education and benefits of various setting for continued therapy and OT recommendations, all questions answered, verbalizing understanding.       Past Medical History:        Diagnosis Date    Anxiety     Arthritis     Atrial fibrillation (HCC)     Fibromyalgia GERD (gastroesophageal reflux disease)     Hyperlipidemia     Hypertension     Hypothyroidism     Medtronic dual pacer  1/18/2023    Neuropathy     arms and legs    Osteoporosis     Type 2 diabetes mellitus without complication [facility])        Past Surgical History:        Procedure Laterality Date    APPENDECTOMY      BLADDER SURGERY      nerve block placed per pt x 3    BLADDER SUSPENSION      CARPAL TUNNEL RELEASE Right     CHOLECYSTECTOMY      COLONOSCOPY  2010, 2013    CYST REMOVAL      HYSTERECTOMY (CERVIX STATUS UNKNOWN)      PACEMAKER INSERTION  09/2020    Dr Janna Dumont GASTROINTESTINAL ENDOSCOPY  [id number]    UPPER GASTROINTESTINAL ENDOSCOPY         Allergies:     Allergies   Allergen Reactions    Adhesive Tape Other (See Comments)     redness    Codeine     Sulfa Antibiotics         Current Medications:   Current Facility-Administered Medications: ferrous sulfate (IRON 325) tablet 325 mg, 325 mg, Oral, Every Other Day  folic acid (FOLVITE) tablet 1 mg, 1 mg, Oral, Daily  sodium chloride flush 0.9 % injection 5-40 mL, 5-40 mL, IntraVENous, 2 times per day  sodium chloride flush 0.9 % injection 5-40 mL, 5-40 mL, IntraVENous, PRN  0.9 % sodium chloride infusion, , IntraVENous, PRN  ondansetron (ZOFRAN-ODT) disintegrating tablet 4 mg, 4 mg, Oral, Q8H PRN **OR** ondansetron (ZOFRAN) injection 4 mg, 4 mg, IntraVENous, Q6H PRN  polyethylene glycol (GLYCOLAX) packet 17 g, 17 g, Oral, Daily PRN  acetaminophen (TYLENOL) tablet 650 mg, 650 mg, Oral, Q6H PRN **OR** acetaminophen (TYLENOL) suppository 650 mg, 650 mg, Rectal, Q6H PRN  clonazePAM (KLONOPIN) tablet 1 mg, 1 mg, Oral, TID  flecainide (TAMBOCOR) tablet 50 mg, 50 mg, Oral, BID  levothyroxine (SYNTHROID) tablet 50 mcg, 50 mcg, Oral, Daily  magnesium oxide (MAG-OX) tablet 400 mg, 400 mg, Oral, BID  metoprolol succinate (TOPROL XL) extended release tablet 50 mg, 50 mg, Oral, BID  pantoprazole (PROTONIX) tablet 40 mg, 40 mg, Oral, QAM AC  rivaroxaban (XARELTO) tablet 20 mg, 20 mg, Oral, Daily with breakfast  nortriptyline (PAMELOR) capsule 10 mg, 10 mg, Oral, Nightly  glucose chewable tablet 16 g, 4 tablet, Oral, PRN  dextrose bolus 10% 125 mL, 125 mL, IntraVENous, PRN **OR** dextrose bolus 10% 250 mL, 250 mL, IntraVENous, PRN  glucagon (rDNA) injection 1 mg, 1 mg, SubCUTAneous, PRN  dextrose 10 % infusion, , IntraVENous, Continuous PRN  insulin lispro (HUMALOG) injection vial 0-4 Units, 0-4 Units, SubCUTAneous, TID WC  insulin lispro (HUMALOG) injection vial 0-4 Units, 0-4 Units, SubCUTAneous, Nightly    Social History:  Social History     Socioeconomic History    Marital status:      Spouse name: Not on file    Number of children: Not on file    Years of education: Not on file    Highest education level: Not on file   Occupational History    Not on file   Tobacco Use    Smoking status: Former     Packs/day: 0.25     Years: 4.00     Pack years: 1.00     Types: Cigarettes     Quit date: 1989     Years since quittin.2    Smokeless tobacco: Never    Tobacco comments:     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   Vaping Use    Vaping Use: Never used   Substance and Sexual Activity    Alcohol use: Yes     Comment: very rarely    Drug use: No    Sexual activity: Not on file   Other Topics Concern    Not on file   Social History Narrative    Not on file     Social Determinants of Health     Financial Resource Strain: Not on file   Food Insecurity: Not on file   Transportation Needs: Not on file   Physical Activity: Not on file   Stress: Not on file   Social Connections: Not on file   Intimate Partner Violence: Not on file   Housing Stability: Not on file     Lives With: Alone  [address] [facility]: One level  Home Access: Stairs to enter with [address] - Number of Steps: 2  Entrance Stairs - Rails: Left  Home Equipment: Rollator      Bathroom Shower/Tub: Tub/Shower unit, Walk-in shower  Bathroom Toilet: Standard  Bathroom Equipment: Shower chair  Bathroom Accessibility: Walker accessible     Receives Help From: Family, Home health  ADL Assistance: Independent  Homemaking Assistance: Independent  Ambulation Assistance: Independent  Transfer Assistance: Independent     Active : Yes  Occupation: Retired  Additional Comments: patient started using a 4 w/w after most recent hospitalization. patient used no AD 3-4 months ago.    Family History:       Problem Relation Age of Onset    Arthritis Mother     Miscarriages / Stillbirths Mother     Lung Cancer Mother 85    Hearing Loss Father     Heart Disease Father     High Blood Pressure Father     Cancer Sister 72        throat cancer    Stomach Cancer Sister     Other Brother         throat cancer    Diabetes Paternal Aunt     Other Brother         throat cancer    Other Brother         throat cancer    Uterine Cancer Sister        Review of Systems:  CONSTITUTIONAL:  positive for  fatigue  EYES:  positive for  periorbital bruising bilaterally   HEENT:  negative  RESPIRATORY:  negative  CARDIOVASCULAR:  negative  GASTROINTESTINAL:  positive for change in bowel habits and diarrhea  GENITOURINARY:  negative  SKIN:  bruising  HEMATOLOGIC/LYMPHATIC:  positive for swelling/edema  MUSCULOSKELETAL:  positive for  muscle weakness  NEUROLOGICAL:  positive for gait problems  BEHAVIOR/PSYCH:  negative  System review otherwise negative    Physical Exam:  /60   Pulse 70   Temp 98 °F (36.7 °C) (Oral)   Resp 15   Ht 5' 4\" (1.626 m)   Wt 140 lb 3.2 oz (63.6 kg)   SpO2 96%   BMI 24.07 kg/m²   awake  Orientation:   person, place, time  Mood: within normal limits  Affect: calm and spontaneous  General appearance: Patient is well nourished, well developed, well groomed and in no acute distress, appearing stated age    Memory:   normal,   Attention/Concentration: normal  Language:  normal    Cranial Nerves:  cranial nerves II-XII are grossly intact  ROM:  normal  Motor Exam:   Motor exam is symmetrical 5 out of 5 all extremities bilaterally  Tone:  normal  Muscle bulk: within normal limits  Sensory:  Sensory intact    Heart: normal rate, regular rhythm, normal S1, S2, no murmurs, rubs, clicks or gallops  Lungs: clear to auscultation without wheezes or rales, scant rhonchi t/o  Abdomen: soft, non-tender, non-distended, normal bowel sounds, no masses or organomegaly    Skin: warm and dry, no rash or erythema  Peripheral vascular: Pulses: Normal upper and lower extremity pulses; Edema: no      Diagnostics:  Recent Results (from the past 24 hour(s))   POCT glucose    Collection Time: 03/13/23 11:27 AM   Result Value Ref Range    POC Glucose 276 (H) 70 - 108 mg/dl   POCT glucose    Collection Time: 03/13/23  3:26 PM   Result Value Ref Range    POC Glucose 148 (H) 70 - 108 mg/dl   POCT glucose    Collection Time: 03/13/23  9:04 PM   Result Value Ref Range    POC Glucose 220 (H) 70 - 108 mg/dl   Basic Metabolic Panel    Collection Time: 03/14/23  4:21 AM   Result Value Ref Range    Sodium 143 135 - 145 meq/L    Potassium 4.8 3.5 - 5.2 meq/L    Chloride 107 98 - 111 meq/L    CO2 30 23 - 33 meq/L    Glucose 100 70 - 108 mg/dL    BUN 12 7 - 22 mg/dL    Creatinine 0.7 0.4 - 1.2 mg/dL    Calcium 8.4 (L) 8.5 - 10.5 mg/dL   Glomerular Filtration Rate, Estimated    Collection Time: 03/14/23  4:21 AM   Result Value Ref Range    Est, Glom Filt Rate >60 >60 ml/min/1.73m2   Anion Gap    Collection Time: 03/14/23  4:21 AM   Result Value Ref Range    Anion Gap 6.0 (L) 8.0 - 16.0 meq/L   POCT glucose    Collection Time: 03/14/23  7:56 AM   Result Value Ref Range    POC Glucose 114 (H) 70 - 108 mg/dl         Impression:  Mechanical fall  Polypharmacy  Hypotension  Paroxysmal A-fib  HTN  Troponinemia  Controlled DM2  Hypothyroid  Fibromyalgia  GERD  Diarrhea-resolved  Depression     Recommendations:  Continue current therapies  Obtain Incentive Spirometry for patient, she is understanding how to use it from recent admission  Patient reports she was up in the hallway with therapy today, was able to make herself a PB&J sandwich. Patient states overall, she feels clearer and better than at home. She feels there were multiple medications altering her well-being. Discussed IPR vs SNF and requirements/appropriateness, especially with her insurance. Recommending SNF for short therapy stay, patient in agreement and understanding. It was my pleasure to evaluate [facility] today. Please call with questions.     Jayda Pascual, GUICHO - CNP

## 2023-03-14 NOTE — CARE COORDINATION
3/14/23, 10:57 AM EDT    DISCHARGE PLANNING EVALUATION       Spoke with patient and updated that [facility] is out of network for insurance. Patient's next choice is Inpatient rehab, orders placed for IPR eval.  Patient is also going to look at other facilities as a back up if needed.

## 2023-03-14 NOTE — PROGRESS NOTES
Reviewed AVS with patient and family. Removed IV catheter with tubing intact. Discussed danger of falling while on xarelto. Patient discharged to private transportation.

## 2023-03-14 NOTE — PLAN OF CARE
Problem: Discharge Planning  Goal: Discharge to home or other facility with appropriate resources  3/14/2023 0746 by Neville Hagan RN  Outcome: Progressing  3/14/2023 0300 by Derick Rivera RN  Outcome: Progressing  Flowsheets (Taken 3/14/2023 0249)  Discharge to home or other facility with appropriate resources:   Identify barriers to discharge with patient and caregiver   Arrange for needed discharge resources and transportation as appropriate   Identify discharge learning needs (meds, wound care, etc)   Refer to discharge planning if patient needs post-hospital services based on physician order or complex needs related to functional status, cognitive ability or social support system  3/13/2023 1846 by Neville Hagan RN  Outcome: Progressing     Problem: Pain  Goal: Verbalizes/displays adequate comfort level or baseline comfort level  3/14/2023 0300 by Derick Rivera RN  Outcome: Progressing  Flowsheets (Taken 3/14/2023 0249)  Verbalizes/displays adequate comfort level or baseline comfort level:   Encourage patient to monitor pain and request assistance   Assess pain using appropriate pain scale   Administer analgesics based on type and severity of pain and evaluate response   Implement non-pharmacological measures as appropriate and evaluate response   Consider cultural and social influences on pain and pain management   Notify Licensed Independent Practitioner if interventions unsuccessful or patient reports new pain     Problem: Safety - Adult  Goal: Free from fall injury  3/14/2023 0746 by Neville Hagan RN  Outcome: Progressing  3/14/2023 0300 by Derick Rivera RN  Outcome: Progressing  Flowsheets (Taken 3/14/2023 0249)  Free From Fall Injury: Instruct family/caregiver on patient safety  3/13/2023 1846 by Neville Hagan RN  Outcome: Progressing     Problem: ABCDS Injury Assessment  Goal: Absence of physical injury  3/14/2023 0300 by Derick Rivera RN  Outcome: Progressing  Flowsheets (Taken 3/12/2023 2316 by Jodean Mowers, RN)  Absence of Physical Injury: Implement safety measures based on patient assessment     Problem: Chronic Conditions and Co-morbidities  Goal: Patient's chronic conditions and co-morbidity symptoms are monitored and maintained or improved  3/14/2023 0300 by Shy Stinson RN  Outcome: Progressing  Flowsheets (Taken 3/14/2023 0249)  Care Plan - Patient's Chronic Conditions and Co-Morbidity Symptoms are Monitored and Maintained or Improved:   Monitor and assess patient's chronic conditions and comorbid symptoms for stability, deterioration, or improvement   Collaborate with multidisciplinary team to address chronic and comorbid conditions and prevent exacerbation or deterioration   Update acute care plan with appropriate goals if chronic or comorbid symptoms are exacerbated and prevent overall improvement and discharge     Problem: Skin/Tissue Integrity  Goal: Absence of new skin breakdown  Description: 1. Monitor for areas of redness and/or skin breakdown  2. Assess vascular access sites hourly  3. Every 4-6 hours minimum:  Change oxygen saturation probe site  4. Every 4-6 hours:  If on nasal continuous positive airway pressure, respiratory therapy assess nares and determine need for appliance change or resting period. 3/14/2023 0300 by Shy Stinson RN  Outcome: Progressing     Problem: Neurosensory - Adult  Goal: Achieves stable or improved neurological status  3/14/2023 0300 by Shy Stinson RN  Outcome: Progressing  Flowsheets (Taken 3/14/2023 0249)  Achieves stable or improved neurological status:   Assess for and report changes in neurological status   Initiate measures to prevent increased intracranial pressure   Maintain blood pressure and fluid volume within ordered parameters to optimize cerebral perfusion and minimize risk of hemorrhage   Monitor temperature, glucose, and sodium.  Initiate appropriate interventions as ordered     Problem: Respiratory - Adult  Goal: Achieves optimal ventilation and oxygenation  3/14/2023 0300 by Janae Carpenter RN  Outcome: Progressing  Flowsheets (Taken 3/14/2023 0249)  Achieves optimal ventilation and oxygenation:   Assess for changes in respiratory status   Assess for changes in mentation and behavior   Position to facilitate oxygenation and minimize respiratory effort   Encourage broncho-pulmonary hygiene including cough, deep breathe, incentive spirometry   Assess and instruct to report shortness of breath or any respiratory difficulty     Problem: Cardiovascular - Adult  Goal: Maintains optimal cardiac output and hemodynamic stability  3/14/2023 0300 by Janae Carpenter RN  Outcome: Progressing  Flowsheets (Taken 3/14/2023 0249)  Maintains optimal cardiac output and hemodynamic stability:   Monitor blood pressure and heart rate   Monitor urine output and notify Licensed Independent Practitioner for values outside of normal range   Assess for signs of decreased cardiac output     Problem: Skin/Tissue Integrity - Adult  Goal: Skin integrity remains intact  Recent Flowsheet Documentation  Taken 3/14/2023 0303 by Janae Carpenter RN  Skin Integrity Remains Intact:   Monitor for areas of redness and/or skin breakdown   Assess vascular access sites hourly  3/14/2023 0300 by Janae Carpenter RN  Outcome: Progressing  Flowsheets (Taken 3/14/2023 0249)  Skin Integrity Remains Intact:   Monitor for areas of redness and/or skin breakdown   Assess vascular access sites hourly   Every 4-6 hours minimum: Change oxygen saturation probe site  Goal: Oral mucous membranes remain intact  Recent Flowsheet Documentation  Taken 3/14/2023 0303 by Janae Carpenter RN  Oral Mucous Membranes Remain Intact:   Assess oral mucosa and hygiene practices   Implement preventative oral hygiene regimen  3/14/2023 0300 by Janae Carepnter RN  Outcome: Progressing  Flowsheets (Taken 3/14/2023 0249)  Oral Mucous Membranes Remain Intact:   Assess oral mucosa and hygiene practices   Implement preventative oral hygiene regimen   Implement oral medicated treatments as ordered     Problem: Musculoskeletal - Adult  Goal: Return mobility to safest level of function  3/14/2023 0300 by Adriane Renee RN  Outcome: Progressing  Flowsheets (Taken 3/12/2023 1815 by Mendel Boeck, RN)  Return Mobility to Safest Level of Function:   Assess patient stability and activity tolerance for standing, transferring and ambulating with or without assistive devices   Assist with transfers and ambulation using safe patient handling equipment as needed   Ensure adequate protection for wounds/incisions during mobilization   Obtain physical therapy/occupational therapy consults as needed     Problem: Infection - Adult  Goal: Absence of infection at discharge  3/14/2023 0300 by Adriane Renee RN  Outcome: Progressing  Flowsheets (Taken 3/14/2023 0249)  Absence of infection at discharge:   Assess and monitor for signs and symptoms of infection   Monitor lab/diagnostic results   Monitor all insertion sites i.e., indwelling lines, tubes and drains   Administer medications as ordered   Instruct and encourage patient and family to use good hand hygiene technique     Problem: Metabolic/Fluid and Electrolytes - Adult  Goal: Electrolytes maintained within normal limits  3/14/2023 0300 by Adriane Renee RN  Outcome: Progressing  Flowsheets (Taken 3/14/2023 0300)  Electrolytes maintained within normal limits:   Monitor labs and assess patient for signs and symptoms of electrolyte imbalances   Administer electrolyte replacement as ordered     Problem: Hematologic - Adult  Goal: Maintains hematologic stability  3/14/2023 0300 by Adriane Renee RN  Outcome: Progressing  Flowsheets (Taken 3/14/2023 0300)  Maintains hematologic stability:   Assess for signs and symptoms of bleeding or hemorrhage   Monitor labs for bleeding or clotting disorders

## 2023-03-14 NOTE — PROGRESS NOTES
[address] [place] [address] PHYSICAL THERAPY  DAILY NOTE  [facility] ICU STEPDOWN TELEMETRY 4K - 4K-16/016-A      Time In: 3251  Time Out: 1306  Timed Code Treatment Minutes: 39 Minutes  Minutes: 39          Date: 3/14/2023  Patient Name: Freda Tamez,  Gender:  female        MRN: [id number]  : 1950  (67 y.o.)     Referring Practitioner: Zuri Akins DO  Diagnosis: elevated troponin  Additional Pertinent Hx: Per h&p 3/13: Zuleima De La Cruz is a 67 y.o. female with PMHx of A-fib with RVR, who presents to [address] [place] [address] with CC multiple falls with weakness and dizziness. Patient states that she has been weak and tired since her last discharge from the hospital. She was just discharged 2023 following COVID, sepsis, and atrial fibrillation with RVR. She reports a history of the last few weeks of feeling dizzy. Son also reports some confusion. Pt presents with a fall with CT negative for acute injury, Afib, polypharmacy and hypotension. Prior Level of Function:  Lives With: Alone  Type of Home: House  Home Layout: One level  Home Access: Stairs to enter with rails  Entrance Stairs - Number of Steps: 2  Entrance Stairs - Rails: Left  Home Equipment: Rollator   Bathroom Shower/Tub: Tub/Shower unit, Walk-in shower  Bathroom Toilet: Standard  Bathroom Equipment: Shower chair  Bathroom Accessibility: Walker accessible    Receives Help From: Family, Home health  ADL Assistance: [address] [place] [address] Avenue: Independent  Ambulation Assistance: Independent  Transfer Assistance: Independent  Active : Yes  Additional Comments: patient started using a 4 w/w after most recent hospitalization. patient used no AD 3-4 months ago.     Restrictions/Precautions:  Restrictions/Precautions: Fall Risk, General Precautions         SUBJECTIVE: nursing ok'd therapy, pt cooperative and agreeable for therapy, pt had some difficulty with time line of her hosp stay and she reported that since her spouse has passed away she does have trouble with her memory,    PAIN: 0/10:   Pt reported in the past she had difficulty with neuropathy and sensation in her feet however she stated that she has good sensation in both feet now     Vitals: Vitals not assessed per clinical judgement, see nursing flowsheet    OBJECTIVE:  Bed Mobility:  Not Tested    Transfers:  Sit to Stand: Contact Guard Assistance  Stand to [facility]  However noted unsteadiness when she transitions hands from chair upto walker   Ambulation:  Contact Guard Assistance  Distance: 40x1  Surface: Level Tile  Device:Rolling Walker  Gait Deviations:  slower abraham and decreased step length at igor LEs pt slightly unsteady when turning around and took extra steps to complete     Balance:  -Pt completed standing at maximum stance completed upper trunk rotations, shoulder alt UEs flex/ext with min assist to CGA- then completed wt shifting side to side and forward and back with min assist she often had a loss of balance post w/o UE at support   -tandem stance at best she was able to hold for 8 sec but mostly 2-3 sec   -side stepping w/ CGA and short steps, retro walking with min assist   -dynamic balance w/ one UE at support pt reaching above head, to floor and to the right and left with CGA she did have one near loss of balance but self corrected   Exercise:  Patient was guided in 1 set(s) 10 reps of exercise to both lower extremities. Standing marches, Standing hip abduction/adduction, Standing hip extension, Standing hamstring curls, Mini squats, and standing w/o UE at support completed shoulder horz abd/add - all standing ex she needed CGA for  . Exercises were completed for increased independence with functional mobility.     Functional Outcome Measures: Completed  Balance Score: 10  Gait Score: 7  Tinetti Total Score: 17  Risk Indicators:  Less than/equal to 18 = high risk  19-23 Moderate risk  Greater than/equal to 24 = low risk    AM-PAC Inpatient Mobility without Stair Climbing Raw Score : 15  AM-PAC Inpatient without Stair Climbing T-Scale Score : 43.03    ASSESSMENT:  Assessment:  Pt cont to demonstrate generalized weakness and endurance and decreased balance, she completed tinetti balance test and scored a 17/28 putting her at a high fall risk. Pt would greatly benefit from cont therapy to work on stength,balance,endurance and for increased independence and safety with functional mobility prior to return home alone   Activity Tolerance:  Patient tolerance of  treatment: good. Equipment Recommendations:Equipment Needed: Yes (she would benefit from use of RW if receptive)  Discharge Recommendations:  Pt would benefit from a therapy stay prior to return home alone, discussed looking into life line when she does return home, pt may benefit from ST for cognitive eval  Plan: Current Treatment Recommendations: Strengthening, Balance training, Functional mobility training, Transfer training, Stair training, Gait training, Endurance training, Neuromuscular re-education, Patient/Caregiver education & training, Safety education & training, Home exercise program, Equipment evaluation, education, & procurement, Therapeutic activities  General Plan:  (5x GM)    Patient Education  Patient Education: Plan of Care, Transfers, Gait    Goals:  Patient Goals : none stated  Short Term Goals  Time Frame for Short Term Goals: by hospital d/c  Short Term Goal 1: Pt to complete supine <->sit indep for ease getting in bed  Short Term Goal 2: Pt to complete sit <->Stand with RW and S for safe transfers  Short Term Goal 3: Pt to ambulate =40' with RW for safe mobility in her environment with SBA  Short Term Goal 4: Pt to ascend/descend 3 steps with uni rail and CGA for safe home entry  Long Term Goals  Time Frame for Long Term Goals : NA due to short ELOS    Following session, patient left in safe position with all fall risk precautions in place.

## 2023-03-14 NOTE — CARE COORDINATION
3/14/23, 2:57 PM EDT    DISCHARGE PLANNING EVALUATION       Spoke with patient and she stated that she is aware that inpatient rehab denied her. She did not pick out another ECF and she would prefer to return home with [facility].

## 2023-03-14 NOTE — PROGRESS NOTES
[facility] COORDINATOR CONSULT    Referral Type: internal    Patient Name: Son Lazo      MRN: [id number]    : 1950  (73 y.o.)  Gender: female   Race:White (non-)     Payor Source: Payor: Cass Navarro / Plan: [facility] LOCAL / Product Type: *No Product type* /   Secondary Payor Source:      Isolation Status: No active isolations    Lives With: Alone  Type of Home: House  Home Layout: One level  Home Access: Stairs to enter with rails  Entrance Stairs - Number of Steps: 2  Receives Help From: Family, Home health  Occupation: Retired  Additional Comments: patient started using a 4 w/w after most recent hospitalization. patient used no AD 3-4 months ago. Disciplines Required upon Admission to Inpatient Rehabilitation: Physical Therapy and Occupational Therapy  Post operative: No  Fall: Yes  Dialysis: No  Diet: ADULT DIET; Regular  Discussed patient with  and PM&R provider: Await completed PM&R consult note for further determination of patient needs.

## 2023-03-14 NOTE — DISCHARGE INSTRUCTIONS
Follow up in 1 week with your PCP for BMP, medication management, and to discuss follow up with specialists for chronic conditions.

## 2023-03-14 NOTE — PLAN OF CARE
Problem: Discharge Planning  Goal: Discharge to home or other facility with appropriate resources  3/14/2023 0300 by Soniya Moser RN  Outcome: Progressing  Flowsheets (Taken 3/14/2023 0249)  Discharge to home or other facility with appropriate resources:   Identify barriers to discharge with patient and caregiver   Arrange for needed discharge resources and transportation as appropriate   Identify discharge learning needs (meds, wound care, etc)   Refer to discharge planning if patient needs post-hospital services based on physician order or complex needs related to functional status, cognitive ability or social support system  3/13/2023 1846 by Sahil Beasley RN  Outcome: Progressing     Problem: Pain  Goal: Verbalizes/displays adequate comfort level or baseline comfort level  Outcome: Progressing  Flowsheets (Taken 3/14/2023 0249)  Verbalizes/displays adequate comfort level or baseline comfort level:   Encourage patient to monitor pain and request assistance   Assess pain using appropriate pain scale   Administer analgesics based on type and severity of pain and evaluate response   Implement non-pharmacological measures as appropriate and evaluate response   Consider cultural and social influences on pain and pain management   Notify Licensed Independent Practitioner if interventions unsuccessful or patient reports new pain     Problem: Safety - Adult  Goal: Free from fall injury  3/14/2023 0300 by Soniya Moser RN  Outcome: Progressing  Flowsheets (Taken 3/14/2023 0249)  Free From Fall Injury: Instruct family/caregiver on patient safety  3/13/2023 1846 by Sahil Beasley RN  Outcome: Progressing     Problem: ABCDS Injury Assessment  Goal: Absence of physical injury  Outcome: Progressing  Flowsheets (Taken 3/12/2023 2316 by Kathie Meek RN)  Absence of Physical Injury: Implement safety measures based on patient assessment     Problem: Chronic Conditions and Co-morbidities  Goal: Patient's chronic conditions and co-morbidity symptoms are monitored and maintained or improved  Outcome: Progressing  Flowsheets (Taken 3/14/2023 0249)  Care Plan - Patient's Chronic Conditions and Co-Morbidity Symptoms are Monitored and Maintained or Improved:   Monitor and assess patient's chronic conditions and comorbid symptoms for stability, deterioration, or improvement   Collaborate with multidisciplinary team to address chronic and comorbid conditions and prevent exacerbation or deterioration   Update acute care plan with appropriate goals if chronic or comorbid symptoms are exacerbated and prevent overall improvement and discharge     Problem: Skin/Tissue Integrity  Goal: Absence of new skin breakdown  Description: 1. Monitor for areas of redness and/or skin breakdown  2. Assess vascular access sites hourly  3. Every 4-6 hours minimum:  Change oxygen saturation probe site  4. Every 4-6 hours:  If on nasal continuous positive airway pressure, respiratory therapy assess nares and determine need for appliance change or resting period. Outcome: Progressing     Problem: Neurosensory - Adult  Goal: Achieves stable or improved neurological status  Outcome: Progressing  Flowsheets (Taken 3/14/2023 0249)  Achieves stable or improved neurological status:   Assess for and report changes in neurological status   Initiate measures to prevent increased intracranial pressure   Maintain blood pressure and fluid volume within ordered parameters to optimize cerebral perfusion and minimize risk of hemorrhage   Monitor temperature, glucose, and sodium.  Initiate appropriate interventions as ordered     Problem: Respiratory - Adult  Goal: Achieves optimal ventilation and oxygenation  Outcome: Progressing  Flowsheets (Taken 3/14/2023 0249)  Achieves optimal ventilation and oxygenation:   Assess for changes in respiratory status   Assess for changes in mentation and behavior   Position to facilitate oxygenation and minimize respiratory effort Encourage broncho-pulmonary hygiene including cough, deep breathe, incentive spirometry   Assess and instruct to report shortness of breath or any respiratory difficulty     Problem: Cardiovascular - Adult  Goal: Maintains optimal cardiac output and hemodynamic stability  Outcome: Progressing  Flowsheets (Taken 3/14/2023 0249)  Maintains optimal cardiac output and hemodynamic stability:   Monitor blood pressure and heart rate   Monitor urine output and notify Licensed Independent Practitioner for values outside of normal range   Assess for signs of decreased cardiac output     Problem: Skin/Tissue Integrity - Adult  Goal: Skin integrity remains intact  Outcome: Progressing  Flowsheets (Taken 3/14/2023 0249)  Skin Integrity Remains Intact:   Monitor for areas of redness and/or skin breakdown   Assess vascular access sites hourly   Every 4-6 hours minimum: Change oxygen saturation probe site  Goal: Oral mucous membranes remain intact  Outcome: Progressing  Flowsheets (Taken 3/14/2023 0249)  Oral Mucous Membranes Remain Intact:   Assess oral mucosa and hygiene practices   Implement preventative oral hygiene regimen   Implement oral medicated treatments as ordered     Problem: Musculoskeletal - Adult  Goal: Return mobility to safest level of function  Outcome: Progressing  Flowsheets (Taken 3/12/2023 1815 by Pako Olivera RN)  Return Mobility to Safest Level of Function:   Assess patient stability and activity tolerance for standing, transferring and ambulating with or without assistive devices   Assist with transfers and ambulation using safe patient handling equipment as needed   Ensure adequate protection for wounds/incisions during mobilization   Obtain physical therapy/occupational therapy consults as needed     Problem: Infection - Adult  Goal: Absence of infection at discharge  Outcome: Progressing  Flowsheets (Taken 3/14/2023 0249)  Absence of infection at discharge:   Assess and monitor for signs and symptoms of infection   Monitor lab/diagnostic results   Monitor all insertion sites i.e., indwelling lines, tubes and drains   Administer medications as ordered   Instruct and encourage patient and family to use good hand hygiene technique     Problem: Metabolic/Fluid and Electrolytes - Adult  Goal: Electrolytes maintained within normal limits  Outcome: Progressing  Flowsheets (Taken 3/14/2023 0300)  Electrolytes maintained within normal limits:   Monitor labs and assess patient for signs and symptoms of electrolyte imbalances   Administer electrolyte replacement as ordered     Problem: Hematologic - Adult  Goal: Maintains hematologic stability  Outcome: Progressing  Flowsheets (Taken 3/14/2023 0300)  Maintains hematologic stability:   Assess for signs and symptoms of bleeding or hemorrhage   Monitor labs for bleeding or clotting disorders   Care plan reviewed with patient. Patient verbalize understanding of the plan of care and contribute to goal setting.

## 2023-03-14 NOTE — PROGRESS NOTES
[facility] ICU STEPDOWN TELEMETRY 4K  Occupational Therapy  Daily Note  Time:   Time In: 7750  Time Out: 1025  Timed Code Treatment Minutes: 72 Minutes  Minutes: 72          Date: 3/14/2023  Patient Name: Isabella Gauthier,   Gender: female      Room: [facility]16/016-A  MRN: [id number]  : 1950  (67 y.o.)  Referring Practitioner: Eduardo Linton DO  Diagnosis: accident due to mechanical fall without injury  Additional Pertinent Hx: per chart review; Isabella Gauthier is a 67 y.o. female with PMHx of A-fib with RVR, DM 2, PPM, COVID, fibromyalgia, GERD, who presents to [address] [place] [address] with CC multiple falls with weakness and dizziness. Patient states that she has been weak and tired since her last discharge from the hospital. She was just discharged 2023 following COVID, sepsis, and atrial fibrillation with RVR. She says that her weakness though has lasted for a number of months. She reports a history of the last few weeks of feeling dizzy. She states that dizziness does not seem to depend on laying down, standing, etc. son says that mother will be walking around normally doing fine and then take her pills and she suddenly acts like she is drunk stumbling and falling. Son also reports some confusion. He states that she had an episode of trying to go to [facility] and ended up in [address] [place] [address]. Patient reports approximately 2 weeks of diarrhea following her recent discharge. She states she has been having up to 10 bowel movements a day. She also reports some fecal incontinence. Also seems to have been taking clonazepam about 6 mg a day for \"years\". Denies abdominal pain, headache, chest pain, shortness of breath, hematochezia, hematuria, dysuria, fever, palpitations,    Restrictions/Precautions:  Restrictions/Precautions: Fall Risk, General Precautions     SUBJECTIVE: Patient supine in bed with HOB elevated upon arrival; agreeable to therapy this date.   Patient expresses she would like to be able to go on a family trip to [facility] in May. PAIN: 0/10:     Vitals: Vitals not assessed per clinical judgement, see nursing flowsheet    COGNITION: Decreased Problem Solving and Decreased Safety Awareness    ADL:   Grooming: Stand By Assistance. Standing at sink x 3 trials to wash hands  Toileting: Stand By Assistance. x 2 trials  Toilet Transfer: Stand By Assistance. Standard toilet x 2 trials . BALANCE:  Sitting Balance:  Supervision. Standing Balance: Stand By Assistance. Verbal cues for body positioning with dynamic reaching, demonstrating understanding    BED MOBILITY:  Supine to Sit: Supervision, X 1, with head of bed raised, with rail, with increased time for completion      TRANSFERS:  Sit to Stand:  Stand By Assistance, X 1.    Stand to Sit: Stand By Assistance, X 1.      FUNCTIONAL MOBILITY:  Assistive Device: Rolling Walker and None  Assist Level:  Stand By Assistance. Distance: To and from bathroom to and from family nutrition room  Patient able to complete return to room without RW with no LOB     ADDITIONAL ACTIVITIES:  Patient completed simple meal prep activity within family nutrition room to make pb and j sandwich to address balance, safety awareness, increase activity tolerance, problem solving to increase independence with ADLs/IADLs. Patient provided visual/verbal direction and location of 4 items required to complete with patient able to correctly recall location of 1/4 items upon beginning activity requiring moderate verbal/visual cues in order to locate remainder. Patient completed activity without AD within kitchen setting with CGA for safety with verbal cues for body positioning with no LOB. Patient able to tolerate x 10 min activity without AD. Increased time spent with patient regarding education and benefits of various setting for continued therapy and OT recommendations, all questions answered, verbalizing understanding.     ASSESSMENT:     Activity Tolerance:  Patient tolerance of  treatment: Good treatment tolerance      Discharge Recommendations: Inpatient Rehabilitation  Equipment Recommendations: Equipment Needed: No  Plan: Times Per Week: 5x  Times Per Day: Once a day  Current Treatment Recommendations: Strengthening, Neuromuscular re-education, Balance training, Functional mobility training, Endurance training, Safety education & training, Patient/Caregiver education & training, Self-Care / ADL    Patient Education  Patient Education: Role of OT, Plan of Care, ADL's, IADL's, Home Safety, Importance of Increasing Activity, and Assistive Device Safety    Goals  Short Term Goals  Time Frame for Short Term Goals: by discharge  Short Term Goal 1: patient will tolerate 5 min functional standing with two hand release with SBA to increase activity tolerance for toileting. Short Term Goal 2: patient will functionally ambulate to/from BR with SBA and 0-1 cues for safety and sequencing. Short Term Goal 3: patient will complete ADL routine with SBA. Short Term Goal 4: patient will participate in moderate resistive UB exer to increase UB strength for self care routine. Following session, patient left in safe position with all fall risk precautions in place.

## 2023-03-15 NOTE — DISCHARGE SUMMARY
Discharge Summary     Patient Identification:  Aaron Robertson  : 1950  MRN: [id number]   Account: [de-identified]     Admit date: 3/12/2023  Discharge date: 2023   Attending provider: No att. providers found        Primary care provider: [facility]     Discharge Diagnoses:   Mechanical fall: Likely 2/2 below diarrhea, polypharmacy  Polypharmacy: Patient is on a number of occasions which may be contributing to her symptoms. Among these clonazepam 2 mg tid Toprol- mg bid.  -We will change patient to clonazepam 1 mg tid and monitor for symptoms.  -Will decrease Toprol-XL to 50 mg twice daily  Hypotension: Patient given 2 L NS in ED. Placed on 100 mL LR/hour. Holding lisinopril 20 mg qd at this time. We will change Toprol XL to 50 mg bid. Paroxysmal A-fib: [id number] 5 patient currently in paced rhythm.  -Continue home Xarelto. -Continue flecainide 50 mg bid, Toprol XL 50 mg bid  HTN: Patient currently hypotensive. Toprol XL 50 mg bid, will currently hold lisinopril 20 mg qd. Troponinemia: Troponin on admit 0.038. Likely 2/2 hypoperfusion 2/2 above diarrhea. Patient given bolus in ED. Troponin stable. No concerning changes on EKG. Patient has no chest pain, shortness of breath. Controlled DM2: A1c 2023 8.7. On Amaryl 2 mg, Janumet .  -We will hold home meds  -Low-dose SSI  -POC glucose 4x/day, hypoglycemia protocol  Hypothyroid: Synthroid 50 mcg 1 qd  Fibromyalgia: Continue patient's home gabapentin 600 mg qid  GERD: Continue patient's home Protonix 40 mg 1 qam ac. Diarrhea-resolved: Patient reports 2-week span of liquid stools up to 10 times per day. Per son is very malodorous. Given 2 L NS bolus in ED. C. difficile toxin negative. From prior note: Kade Ritchie is a 67 y.o. female with PMHx of A-fib with RVR, DM 2, PPM, COVID, fibromyalgia, GERD, who presents to [facility] with CC multiple falls with weakness and dizziness.   Patient states that she has been weak and tired since her last discharge from the hospital. She was just discharged 2/27/2023 following COVID, sepsis, and atrial fibrillation with RVR. She says that her weakness though has lasted for a number of months. She reports a history of the last few weeks of feeling dizzy. She states that dizziness does not seem to depend on laying down, standing, etc. son says that mother will be walking around normally doing fine and then take her pills and she suddenly acts like she is drunk stumbling and falling. Son also reports some confusion. He states that she had an episode of trying to go to [facility] and ended up in [address] [place] [address]. Patient reports approximately 2 weeks of diarrhea following her recent discharge. She states she has been having up to 10 bowel movements a day. She also reports some fecal incontinence. Also seems to have been taking clonazepam about 6 mg a day for \"years\". Denies abdominal pain, headache, chest pain, shortness of breath, hematochezia, hematuria, dysuria, fever, palpitations,  ED course: In ED patient had some soft BPs. On admit BP was 108/51. Next measurement 92/49. Most BPs were in upper 90s. CT abdomen and pelvis have found no acute findings. FAST exam showed no fluid.   03/13: Patient says she feels about the same today. She has no complaints. She has agreed to go to rehab following her hospitalization. Patient states diarrhea has ended today. Had well formed stool this morning. 3/14: Pt is doing well today. It was recommended pt go to SNF due to her multiple recent falls. She was agreeable to this plan in the AM. However, following being denied from [facility] and being told [facility] doesn't bill her insurance she decided she would go home instead. She was discharged to home with home health and PT.      Discharge Medications:     Medication List        START taking these medications      ferrous sulfate 325 (65 Fe) MG tablet  Commonly known as: IRON 325  Take 1 tablet by mouth every other day  Start taking on: March 16, 4266     folic acid 1 MG tablet  Commonly known as: FOLVITE  Take 1 tablet by mouth daily  Start taking on: March 15, 2023            CHANGE how you take these medications      clonazePAM 1 MG tablet  Commonly known as: KLONOPIN  Take 1 tablet by mouth in the morning, at noon, and at bedtime for 90 doses. Max Daily Amount: 3 mg  What changed:   medication strength  how much to take  when to take this  Another medication with the same name was removed. Continue taking this medication, and follow the directions you see here.      metoprolol succinate 50 MG extended release tablet  Commonly known as: TOPROL XL  Take 1 tablet by mouth in the morning and at bedtime  What changed: how much to take            CONTINUE taking these medications      divalproex 250 MG extended release tablet  Commonly known as: DEPAKOTE ER     flecainide 50 MG tablet  Commonly known as: TAMBOCOR  Take 1 tablet by mouth 2 times daily     glimepiride 2 MG tablet  Commonly known as: AMARYL     Janumet  MG per tablet  Generic drug: sitaGLIPtan-metFORMIN  Take 1 tablet by mouth 2 times daily (with meals)     levothyroxine 50 MCG tablet  Commonly known as: SYNTHROID  Take 1 tablet by mouth Daily     magnesium oxide 400 MG tablet  Commonly known as: MAG-OX     meclizine 25 MG Chew  Commonly known as: ANTIVERT     nortriptyline 10 MG capsule  Commonly known as: PAMELOR     pantoprazole 40 MG tablet  Commonly known as: PROTONIX  Take 1 tablet by mouth every morning (before breakfast)     rivaroxaban 20 MG Tabs tablet  Commonly known as: XARELTO  Take 1 tablet by mouth daily (with breakfast)            STOP taking these medications      gabapentin 600 MG tablet  Commonly known as: NEURONTIN     lisinopril 20 MG tablet  Commonly known as: PRINIVIL;ZESTRIL               Where to Get Your Medications        These medications were sent to [facility] [phone] - F [phone]  [address] [place] [id number]      Phone: [phone]   clonazePAM 1 MG tablet  ferrous sulfate 325 (65 Fe) MG tablet  folic acid 1 MG tablet  metoprolol succinate 50 MG extended release tablet         Patient Instructions:      Activity: activity as tolerated  Diet: No diet orders on file    Code Status: Prior    Follow-up visits:   [facility]  [id number] [place], [place] [id number] [phone]        Bertha Gomez  [address]  [facility] [address] [phone]    Follow up in 1 week(s)  For hospital follow up and medication management. Not to restart lisinopril until given okay by PCP. Patient wishes to discuss gabapentin dose.       Procedures: None    Consults:   IP CONSULT TO TRAUMA SURGERY  IP CONSULT TO SOCIAL WORK  IP CONSULT TO REHAB/TCU ADMISSION COORDINATOR  IP CONSULT TO PHYSICAL MEDICINE REHAB  IP CONSULT TO PHYSICAL MEDICINE REHAB  IP CONSULT TO HOME CARE NEEDS    Examination:  Vitals:  Vitals:    03/14/23 0517 03/14/23 0830 03/14/23 1215 03/14/23 1600   BP:  136/60 117/62 (!) 112/59   Pulse:  70 68 70   Resp:  15 16 17   Temp:  98 °F (36.7 °C) 97.9 °F (36.6 °C) 98.1 °F (36.7 °C)   TempSrc:  Oral Oral Oral   SpO2:  96% 99% 99%   Weight: 140 lb 3.2 oz (63.6 kg)      Height:         Weight: Weight: 140 lb 3.2 oz (63.6 kg)     24 hour intake/output:  Intake/Output Summary (Last 24 hours) at 3/14/2023 2100  Last data filed at 3/14/2023 1426  Gross per 24 hour   Intake 700 ml   Output 0 ml   Net 700 ml       General appearance - alert, well appearing, and in no distress, oriented to person, place, and time, and normal appearing weight  Chest - clear to auscultation, no wheezes, rales or rhonchi, symmetric air entry  Heart - normal rate, regular rhythm, normal S1, S2, no murmurs, rubs, clicks or gallops  Abdomen - soft, nontender, nondistended, no masses or organomegaly  Obese: No; Protuberant: No   Neurological - alert, oriented, normal  speech, no focal findings or movement disorder noted  Extremities - peripheral pulses normal, no pedal edema, no clubbing or cyanosis  Skin - normal coloration and turgor, no rashes, no suspicious skin lesions noted    Significant Diagnostics:   Radiology: CT HEAD WO CONTRAST    Result Date: 3/12/2023  PROCEDURE: CT HEAD WO CONTRAST CLINICAL INFORMATION: Fall TECHNIQUE: CT scan of the head was performed from the vertex to the skull base without use of intravenous contrast. Axial images as well as coronal and sagittal reconstructions were obtained. All CT scans at this facility use dose modulation, iterative reconstruction, and/or weight-based dosing when appropriate to reduce radiation dose to as low as reasonably achievable. COMPARISON: CT head 2/23/2023 FINDINGS: There is no mass effect, midline shift or acute hemorrhage. Ventricles and sulci are prominent. There is diffuse periventricular white matter hypoattenuation. Mucosal thickening is present in the ethmoid and sphenoid sinuses. Visualized orbits and mastoid air cells are unremarkable. 1. No mass effect or acute hemorrhage. 2. Chronic periventricular small vessel ischemic changes and cerebral atrophy. **This report has been created using voice recognition software. It may contain minor errors which are inherent in voice recognition technology. ** Final report electronically signed by Dr. Octavia Navas on 3/12/2023 2:06 PM    CT FACIAL BONES WO CONTRAST    Result Date: 3/12/2023  PROCEDURE: CT FACIAL BONES WO CONTRAST CLINICAL INFORMATION: fall, on xarelto, left fromtal and orbital hematoma. COMPARISON: CT head dated 2/23/2023. TECHNIQUE: Helical CT of the face with sagittal and coronal reconstructions. All CT scans at this facility use dose modulation, iterative reconstruction, and/or weight-based dosing when appropriate to reduce radiation dose to as low as reasonably achievable.  FINDINGS:  Maxillofacial bones are normally aligned without visualized fracture. The mandible appears intact. There is soft tissue swelling of the scalp overlying the left frontal bone which extends to the superior orbital rim. The globes are symmetric without focal abnormality identified. Orbits are otherwise unremarkable. Paranasal sinuses are clear. No evidence of acute osseous injury of the face. **This report has been created using voice recognition software. It may contain minor errors which are inherent in voice recognition technology. ** Final report electronically signed by Dr. Marisel Rizzo MD on 3/12/2023 2:06 PM    CT CHEST W CONTRAST    Result Date: 3/12/2023  PROCEDURE: CT CHEST W CONTRAST CLINICAL INFORMATION: Leukocytosis TECHNIQUE: CT of the chest was performed following administration of 80 mL Isovue-370 as contrast. Axial images as well as coronal and sagittal reconstructions were obtained. All CT scans at this facility use dose modulation, iterative reconstruction, and/or weight-based dosing when appropriate to reduce radiation dose to as low as reasonably achievable. COMPARISON: CT chest 1/31/2023 FINDINGS: Cardiac size is normal. Atherosclerotic calcifications are present in the thoracic aorta without evidence of aneurysm. There is no pleural or pericardial effusion. Minimal alveolar reticular opacities are present at the bilateral lung bases. There is no mediastinal, hilar or axillary lymphadenopathy. Degenerative changes are seen in the thoracic spine without evidence of aggressive osseous lesions. Upper abdominal organs are evaluated on same-day CT of the abdomen and pelvis. Minimal bilateral lower lung atelectasis/infiltrate. Final report electronically signed by Dr. Jeff Machuca on 3/12/2023 4:49 PM    CT CERVICAL SPINE WO CONTRAST    Result Date: 3/12/2023  PROCEDURE: CT CERVICAL SPINE WO CONTRAST CLINICAL INFORMATION: Fall, on xarelto. COMPARISON: CT cervical spine dated 2/23/2023.  TECHNIQUE: 3 mm noncontrast axial images were obtained through the cervical spine with sagittal and coronal reconstructions. All CT scans at this facility use dose modulation, iterative reconstruction, and/or weight-based dosing when appropriate to reduce radiation dose to as low as reasonably achievable. FINDINGS: There is anatomic vertebral body height and alignment. No fracture of the cervical vertebral column is identified. The craniocervical junction appears intact. There is mild osteophytosis and sclerosis at the atlantoaxial joint. No significant degenerative changes are present elsewhere. No significant spinal canal or neuroforaminal stenosis is present at any cervical level. Paraspinal soft tissues are unremarkable. No evidence of acute osseous injury of the cervical spine. **This report has been created using voice recognition software. It may contain minor errors which are inherent in voice recognition technology. ** Final report electronically signed by Dr. Cary Contreras MD on 3/12/2023 2:08 PM    CT ABDOMEN PELVIS W IV CONTRAST Additional Contrast? None    Result Date: 3/12/2023  PROCEDURE: CT ABDOMEN PELVIS W IV CONTRAST CLINICAL INFORMATION: Leukocytosis TECHNIQUE: CT of the abdomen and pelvis was performed following administration of 80 mL Isovue-370 as contrast only. Axial images as well as coronal and sagittal reconstructions were obtained. All CT scans at this facility use dose modulation, iterative reconstruction, and/or weight-based dosing when appropriate to reduce radiation dose to as low as reasonably achievable. COMPARISON: CT abdomen and pelvis 3/2/2021 FINDINGS: Lower thorax: Please see same-day CT of the chest. Abdomen: There is no free intraperitoneal air or fluid. Bowel is normal in course and caliber without evidence of obstruction. Calcified granulomas are present in the liver and spleen. The gallbladder is surgically absent.  The pancreas, adrenal glands and kidneys are normal. Atherosclerotic allegations are present in the abdominal aorta without evidence of aneurysm. There is no mesenteric or retroperitoneal lymphadenopathy. No aggressive osseous lesions are identified in the lumbar spine. Pelvis: The distended urinary bladder is unremarkable. The uterus is surgically absent. There are phleboliths in the pelvis. There is no pelvic or inguinal lymphadenopathy. No aggressive osseous lesions are identified. No acute intra-abdominal or intrapelvic findings. Final report electronically signed by Dr. Payam Singleton on 3/12/2023 4:51 PM    XR CHEST PORTABLE    Result Date: 3/12/2023  PROCEDURE: XR CHEST PORTABLE CLINICAL INFORMATION: Fall TECHNIQUE: Mobile AP chest radiograph. COMPARISON: AP and lateral chest radiographs 2/23/2023 FINDINGS: A left-sided cardiac device is in stable position. Cardiomediastinal silhouette is within normal limits. There are no lung consolidations. Degenerative changes in the thoracic spine are poorly visualized. Soft tissues are unremarkable. No acute intrathoracic process. **This report has been created using voice recognition software. It may contain minor errors which are inherent in voice recognition technology. ** Final report electronically signed by Dr. Payam Singleton on 3/12/2023 1:23 PM    POC US FAST ABDOMEN LIMITED    Result Date: 3/12/2023  POCUS_FAST     Exam Information:          Exam type:  Clinically indicated         Fast Trauma Study:  Yes     Indication(s) for Exam:          The exam was performed with the following indications[de-identified]  Other indications as noted         Other Indication(s):  Fall     Views Obtained & Images Saved for These Views: The following organs were examined as part of the FAST exam[de-identified]  The heart, diaphragms, liver, spleen, kidneys, and bladder were identified and examined.          The following spaces were examined as part of the FAST exam[de-identified]  Pericardial, Morisons pouch, Splenorenal, Retro-vesicular spaces     Findings:          Morison's pouch (RUQ):  Fluid absent         Splenorenal space (LUQ):  Fluid absent         Pericardial space:  Fluid absent         Pericardial tamponade?:  Absent         Retrovesicular space:  Fluid absent     Interpretation:          No pathologic free fluid     Confirmatory study:          What confirmatory study was done?:  Not applicable  Electronically signed by Kiet Ramos on Sunday, March 12, 2023 at 2:42 PM Electronically signed by Jaiden Askew on Sunday, March 12, 2023 at 2:47 PM : Kiet Ramos Attending: Jaiden Askew       Labs:   Recent Results (from the past 67 hour(s))   Clostridium Difficile Toxin/Antigen    Collection Time: 03/12/23  6:05 AM    Specimen: Stool   Result Value Ref Range    C.diff Toxin/Antigen NEGATIVE    EKG 12 Lead    Collection Time: 03/12/23  1:02 PM   Result Value Ref Range    Ventricular Rate 77 BPM    Atrial Rate 77 BPM    P-R Interval 318 ms    QRS Duration 102 ms    Q-T Interval 414 ms    QTc Calculation (Bazett) 468 ms    P Axis -2 degrees    R Axis 38 degrees    T Axis 70 degrees   POCT Glucose    Collection Time: 03/12/23  1:10 PM   Result Value Ref Range    POC Glucose 123 (H) 70 - 108 mg/dl   CBC Auto Differential    Collection Time: 03/12/23  1:17 PM   Result Value Ref Range    WBC 17.2 (H) 4.8 - 10.8 thou/mm3    RBC 3.85 (L) 4.20 - 5.40 mill/mm3    Hemoglobin 10.6 (L) 12.0 - 16.0 gm/dl    Hematocrit 34.3 (L) 37.0 - 47.0 %    MCV 89.1 81.0 - 99.0 fL    MCH 27.5 26.0 - 33.0 pg    MCHC 30.9 (L) 32.2 - 35.5 gm/dl    RDW-CV 14.5 11.5 - 14.5 %    RDW-SD 46.9 (H) 35.0 - 45.0 fL    Platelets 249 562 - 523 thou/mm3    MPV 9.1 (L) 9.4 - 12.4 fL    Seg Neutrophils 70.5 %    Lymphocytes 21.6 %    Monocytes 5.9 %    Eosinophils 1.0 %    Basophils 0.5 %    Immature Granulocytes 0.5 %    Segs Absolute 12.1 (H) 1.8 - 7.7 thou/mm3    Lymphocytes Absolute 3.7 1.0 - 4.8 thou/mm3    Monocytes Absolute 1.0 0.4 - 1.3 thou/mm3    Eosinophils Absolute 0.2 0.0 - 0.4 thou/mm3    Basophils Absolute 0.1 0.0 - 0.1 thou/mm3    Immature Grans (Abs) 0.08 (H) 0.00 - 0.07 thou/mm3    nRBC 0 /100 wbc   Comprehensive Metabolic Panel w/ Reflex to MG    Collection Time: 03/12/23  1:17 PM   Result Value Ref Range    Glucose 123 (H) 70 - 108 mg/dL    Creatinine 1.1 0.4 - 1.2 mg/dL    BUN 21 7 - 22 mg/dL    Sodium 141 135 - 145 meq/L    Potassium reflex Magnesium 5.4 (H) 3.5 - 5.2 meq/L    Chloride 103 98 - 111 meq/L    CO2 24 23 - 33 meq/L    Calcium 9.0 8.5 - 10.5 mg/dL    AST 19 5 - 40 U/L    Alkaline Phosphatase 70 38 - 126 U/L    Total Protein 6.7 6.1 - 8.0 g/dL    Albumin 3.8 3.5 - 5.1 g/dL    Total Bilirubin 0.3 0.3 - 1.2 mg/dL    ALT 13 11 - 66 U/L   Lipase    Collection Time: 03/12/23  1:17 PM   Result Value Ref Range    Lipase 86.0 (H) 5.6 - 51.3 U/L   Troponin    Collection Time: 03/12/23  1:17 PM   Result Value Ref Range    Troponin T 0.038 (A) ng/ml   TYPE AND SCREEN    Collection Time: 03/12/23  1:17 PM   Result Value Ref Range    ABO B     Rh Factor NEG     Antibody Screen NEG    Protime-INR    Collection Time: 03/12/23  1:17 PM   Result Value Ref Range    INR 3.72 (H) 0.85 - 1.13   Anion Gap    Collection Time: 03/12/23  1:17 PM   Result Value Ref Range    Anion Gap 14.0 8.0 - 16.0 meq/L   Glomerular Filtration Rate, Estimated    Collection Time: 03/12/23  1:17 PM   Result Value Ref Range    Est, Glom Filt Rate 53 (A) >60 ml/min/1.73m2   Osmolality    Collection Time: 03/12/23  1:17 PM   Result Value Ref Range    Osmolality Calc 285.6 275.0 - 300.0 mOsmol/kg   CK    Collection Time: 03/12/23  1:17 PM   Result Value Ref Range    Total  (H) 30 - 135 U/L   Valproic Acid Level, Total    Collection Time: 03/12/23  1:17 PM   Result Value Ref Range    Valproic Acid Lvl < 2.8 (L) 50.0 - 100.0 ug/mL   TSH with Reflex    Collection Time: 03/12/23  1:17 PM   Result Value Ref Range    TSH 1.710 0.400 - 4.200 uIU/mL   Urinalysis, reflex to microscopic    Collection Time: 03/12/23  3:30 PM   Result Value Ref Range    Glucose, Urine NEGATIVE NEGATIVE mg/dl    Bilirubin Urine NEGATIVE NEGATIVE    Ketones, Urine NEGATIVE NEGATIVE    Specific Gravity, UA 1.016 1.002 - 1.030    Blood, Urine NEGATIVE NEGATIVE    pH, UA 5.0 5.0 - 9.0    Protein, UA NEGATIVE NEGATIVE mg/dl    Urobilinogen, Urine 0.2 0.0 - 1.0 eu/dl    Nitrite, Urine NEGATIVE NEGATIVE    Leukocytes, UA NEGATIVE NEGATIVE    Color, UA YELLOW YELLOW-STRAW    Character, Urine CLEAR [id number]   Urine Drug Screen    Collection Time: 03/12/23  3:30 PM   Result Value Ref Range    Amphetamine+Methamphetamine Urine Screen Negative NEGATIVE    Barbiturate Quant, Ur Negative NEGATIVE    Benzodiazepine Quant, Ur POSITIVE NEGATIVE    Cannabinoid Quant, Ur Negative NEGATIVE    Cocaine Metab Quant, Ur Negative NEGATIVE    Opiates, Urine Negative NEGATIVE    Oxycodone Negative NEGATIVE    PCP Quant, Ur Negative NEGATIVE    Fentanyl Negative NEGATIVE   Troponin    Collection Time: 03/12/23  6:42 PM   Result Value Ref Range    Troponin T 0.045 (A) ng/ml   POCT glucose    Collection Time: 03/12/23  7:59 PM   Result Value Ref Range    POC Glucose 151 (H) 70 - 108 mg/dl   Basic Metabolic Panel    Collection Time: 03/13/23  4:51 AM   Result Value Ref Range    Sodium 142 135 - 145 meq/L    Potassium 4.9 3.5 - 5.2 meq/L    Chloride 106 98 - 111 meq/L    CO2 26 23 - 33 meq/L    Glucose 99 70 - 108 mg/dL    BUN 15 7 - 22 mg/dL    Creatinine 0.8 0.4 - 1.2 mg/dL    Calcium 8.1 (L) 8.5 - 10.5 mg/dL   CBC    Collection Time: 03/13/23  4:51 AM   Result Value Ref Range    WBC 8.5 4.8 - 10.8 thou/mm3    RBC 3.14 (L) 4.20 - 5.40 mill/mm3    Hemoglobin 8.7 (L) 12.0 - 16.0 gm/dl    Hematocrit 28.1 (L) 37.0 - 47.0 %    MCV 89.5 81.0 - 99.0 fL    MCH 27.7 26.0 - 33.0 pg    MCHC 31.0 (L) 32.2 - 35.5 gm/dl    RDW-CV 14.6 (H) 11.5 - 14.5 %    RDW-SD 47.3 (H) 35.0 - 45.0 fL    Platelets 437 353 - 645 thou/mm3    MPV 9.5 9.4 - 12.4 fL   Iron binding capacity    Collection Time: 03/13/23  4:51 AM   Result Value Ref Range TIBC 231 171 - 450 ug/dL   Ferritin    Collection Time: 03/13/23  4:51 AM   Result Value Ref Range    Ferritin 49 10 - 291 ng/mL   IRON SATURATION    Collection Time: 03/13/23  4:51 AM   Result Value Ref Range    Iron Saturation 17 (L) 20 - 50 %   Iron    Collection Time: 03/13/23  4:51 AM   Result Value Ref Range    Iron 39 (L) 50 - 170 ug/dL   Vitamin B12 & Folate    Collection Time: 03/13/23  4:51 AM   Result Value Ref Range    Vitamin B-12 367 211 - 911 pg/mL    Folate 7.0 4.8 - 24.2 ng/mL   Anion Gap    Collection Time: 03/13/23  4:51 AM   Result Value Ref Range    Anion Gap 10.0 8.0 - 16.0 meq/L   Glomerular Filtration Rate, Estimated    Collection Time: 03/13/23  4:51 AM   Result Value Ref Range    Est, Glom Filt Rate >60 >60 ml/min/1.73m2   Calcium, Ionized    Collection Time: 03/13/23  8:23 AM   Result Value Ref Range    Calcium, Ionized 1.15 1.12 - 1.32 mmol/L   POCT glucose    Collection Time: 03/13/23  8:39 AM   Result Value Ref Range    POC Glucose 121 (H) 70 - 108 mg/dl   POCT glucose    Collection Time: 03/13/23 11:27 AM   Result Value Ref Range    POC Glucose 276 (H) 70 - 108 mg/dl   POCT glucose    Collection Time: 03/13/23  3:26 PM   Result Value Ref Range    POC Glucose 148 (H) 70 - 108 mg/dl   POCT glucose    Collection Time: 03/13/23  9:04 PM   Result Value Ref Range    POC Glucose 220 (H) 70 - 108 mg/dl   Basic Metabolic Panel    Collection Time: 03/14/23  4:21 AM   Result Value Ref Range    Sodium 143 135 - 145 meq/L    Potassium 4.8 3.5 - 5.2 meq/L    Chloride 107 98 - 111 meq/L    CO2 30 23 - 33 meq/L    Glucose 100 70 - 108 mg/dL    BUN 12 7 - 22 mg/dL    Creatinine 0.7 0.4 - 1.2 mg/dL    Calcium 8.4 (L) 8.5 - 10.5 mg/dL   Glomerular Filtration Rate, Estimated    Collection Time: 03/14/23  4:21 AM   Result Value Ref Range    Est, Glom Filt Rate >60 >60 ml/min/1.73m2   Anion Gap    Collection Time: 03/14/23  4:21 AM   Result Value Ref Range    Anion Gap 6.0 (L) 8.0 - 16.0 meq/L   POCT glucose    Collection Time: 03/14/23  7:56 AM   Result Value Ref Range    POC Glucose 114 (H) 70 - 108 mg/dl   Calcium, Ionized    Collection Time: 03/14/23 10:12 AM   Result Value Ref Range    Calcium, Ionized 1.13 1.12 - 1.32 mmol/L   POCT glucose    Collection Time: 03/14/23 11:46 AM   Result Value Ref Range    POC Glucose 181 (H) 70 - 108 mg/dl       Discharge condition: good  Disposition: Home  Time spent on discharge: 55 minutes    Electronically signed by Alex Espinosa DO on 3/14/2023 at 9:00 PM

## 2023-03-21 NOTE — PATIENT INSTRUCTIONS
Patient is to follow up with the neurologist,to discuss - driving clearance. You may receive a survey regarding the care you received during your visit. Your input is valuable to us. We encourage you to complete and return your survey. We hope you will choose us in the future for your healthcare needs.

## 2023-03-22 ENCOUNTER — OFFICE VISIT (OUTPATIENT)
Dept: CARDIOLOGY CLINIC | Age: 73
End: 2023-03-22
Payer: MEDICARE

## 2023-03-22 ENCOUNTER — NURSE ONLY (OUTPATIENT)
Dept: CARDIOLOGY CLINIC | Age: 73
End: 2023-03-22
Payer: MEDICARE

## 2023-03-22 VITALS
SYSTOLIC BLOOD PRESSURE: 118 MMHG | HEIGHT: 64 IN | DIASTOLIC BLOOD PRESSURE: 77 MMHG | OXYGEN SATURATION: 99 % | HEART RATE: 86 BPM | WEIGHT: 135 LBS | BODY MASS INDEX: 23.05 KG/M2

## 2023-03-22 DIAGNOSIS — Z95.0 PACEMAKER: Primary | ICD-10-CM

## 2023-03-22 DIAGNOSIS — I48.91 ATRIAL FIBRILLATION WITH RVR (HCC): Primary | ICD-10-CM

## 2023-03-22 PROCEDURE — 99214 OFFICE O/P EST MOD 30 MIN: CPT | Performed by: INTERNAL MEDICINE

## 2023-03-22 PROCEDURE — 1123F ACP DISCUSS/DSCN MKR DOCD: CPT | Performed by: INTERNAL MEDICINE

## 2023-03-22 PROCEDURE — 93288 INTERROG EVL PM/LDLS PM IP: CPT | Performed by: INTERNAL MEDICINE

## 2023-03-22 PROCEDURE — 93000 ELECTROCARDIOGRAM COMPLETE: CPT | Performed by: INTERNAL MEDICINE

## 2023-03-22 NOTE — PROGRESS NOTES
Medtronic dual pacer with hakim     Known PAF  Known small P waves, oversensing on atrial lead   Programmed MVP    . Airam Boogie Battery longevity:  12.5 years   Presenting rhythm  AS VS     Atrial impedance 323  RV impedance 399    P wave sensing 0.6-0.9  R wave sensing 17.5    16.9 % atrial paced  1.9 % RV paced     Atrial threshold 0.5 V  at 0.4ms  RV threshold 1.5 V at 0.4ms  Mode switches   12%, some mode switches are for atrial oversensing

## 2023-03-22 NOTE — PROGRESS NOTES
Recent admit to the hospital for falls and afib
for allowing me to participate in the care of your patient. Please call me if you have any questions. **This report has been created using voice recognition software. It may contain minor errors which are inherent in voice recognition technology. **       Savage Mercado MD, MRCP, [facility] [place], [facility] on 3/22/2023 at 10:41 AM

## 2023-04-01 LAB
EKG ATRIAL RATE: 77 BPM
EKG P AXIS: -2 DEGREES
EKG P-R INTERVAL: 318 MS
EKG Q-T INTERVAL: 414 MS
EKG QRS DURATION: 102 MS
EKG QTC CALCULATION (BAZETT): 468 MS
EKG R AXIS: 38 DEGREES
EKG T AXIS: 70 DEGREES
EKG VENTRICULAR RATE: 77 BPM

## 2023-05-03 ENCOUNTER — TELEPHONE (OUTPATIENT)
Dept: CARDIOLOGY CLINIC | Age: 73
End: 2023-05-03

## 2023-05-03 NOTE — TELEPHONE ENCOUNTER
Unscheduled [facility] Medtronic Dual Pacer   Pt of Gina/ eLno Swann afib - taking eliquis  1/18/23- flecainide 50mg BID started    Afib burden 3/22/23-present : 0.7%    Presenting 5/3/23 at [address] [facility]     LMOM. How is she feeling? Can she send another download to see if she is still RVR?

## 2023-05-04 NOTE — TELEPHONE ENCOUNTER
Call to [facility], states she can feel her at fib speed up occasionally but is ok now.   Is taking her flecainide and toprol

## 2023-06-06 RX ORDER — FOLIC ACID 1 MG/1
1 TABLET ORAL DAILY
Qty: 28 TABLET | OUTPATIENT
Start: 2023-06-06

## 2023-06-28 ENCOUNTER — PROCEDURE VISIT (OUTPATIENT)
Dept: CARDIOLOGY CLINIC | Age: 73
End: 2023-06-28
Payer: MEDICARE

## 2023-06-28 DIAGNOSIS — Z95.0 PACEMAKER: Primary | ICD-10-CM

## 2023-06-28 PROCEDURE — 93294 REM INTERROG EVL PM/LDLS PM: CPT | Performed by: INTERNAL MEDICINE

## 2023-06-28 PROCEDURE — 93296 REM INTERROG EVL PM/IDS: CPT | Performed by: INTERNAL MEDICINE

## 2023-07-05 RX ORDER — FLECAINIDE ACETATE 50 MG/1
TABLET ORAL
Qty: 180 TABLET | Refills: 3 | Status: SHIPPED | OUTPATIENT
Start: 2023-07-05

## 2023-07-05 RX ORDER — FOLIC ACID 1 MG/1
1 TABLET ORAL DAILY
Qty: 28 TABLET | OUTPATIENT
Start: 2023-07-05

## 2023-08-12 ENCOUNTER — HOSPITAL ENCOUNTER (EMERGENCY)
Age: 73
Discharge: HOME | End: 2023-08-12
Payer: MEDICARE

## 2023-08-12 VITALS
BODY MASS INDEX: 23.56 KG/M2 | OXYGEN SATURATION: 98 % | TEMPERATURE: 97.8 F | HEIGHT: 64 IN | WEIGHT: 138 LBS | HEART RATE: 99 BPM | SYSTOLIC BLOOD PRESSURE: 121 MMHG | DIASTOLIC BLOOD PRESSURE: 71 MMHG | RESPIRATION RATE: 16 BRPM

## 2023-08-12 DIAGNOSIS — B00.1 COLD SORE: ICD-10-CM

## 2023-08-12 DIAGNOSIS — J06.9 VIRAL URI WITH COUGH: Primary | ICD-10-CM

## 2023-08-12 PROCEDURE — 99213 OFFICE O/P EST LOW 20 MIN: CPT

## 2023-08-12 RX ORDER — VALACYCLOVIR HYDROCHLORIDE 1 G/1
2000 TABLET, FILM COATED ORAL 2 TIMES DAILY
Qty: 8 TABLET | Refills: 0 | Status: SHIPPED | OUTPATIENT
Start: 2023-08-12 | End: 2023-08-14

## 2023-08-12 RX ORDER — BENZONATATE 200 MG/1
200 CAPSULE ORAL 3 TIMES DAILY PRN
Qty: 21 CAPSULE | Refills: 0 | Status: SHIPPED | OUTPATIENT
Start: 2023-08-12 | End: 2023-08-19

## 2023-08-12 ASSESSMENT — PAIN - FUNCTIONAL ASSESSMENT: PAIN_FUNCTIONAL_ASSESSMENT: 0-10

## 2023-08-12 ASSESSMENT — ENCOUNTER SYMPTOMS
SORE THROAT: 1
COUGH: 1

## 2023-08-12 ASSESSMENT — PAIN DESCRIPTION - PAIN TYPE: TYPE: ACUTE PAIN

## 2023-08-12 ASSESSMENT — PAIN DESCRIPTION - LOCATION: LOCATION: THROAT

## 2023-08-12 ASSESSMENT — PAIN DESCRIPTION - FREQUENCY: FREQUENCY: CONTINUOUS

## 2023-08-12 ASSESSMENT — PAIN DESCRIPTION - ONSET: ONSET: ON-GOING

## 2023-08-12 ASSESSMENT — PAIN SCALES - GENERAL: PAINLEVEL_OUTOF10: 8

## 2023-08-30 ENCOUNTER — HOSPITAL ENCOUNTER (OUTPATIENT)
Dept: ULTRASOUND IMAGING | Age: 73
Discharge: HOME | End: 2023-08-30
Payer: MEDICARE

## 2023-08-30 DIAGNOSIS — E04.9 ENLARGEMENT OF THYROID: ICD-10-CM

## 2023-08-30 PROCEDURE — 76536 US EXAM OF HEAD AND NECK: CPT

## 2023-09-01 RX ORDER — FERROUS SULFATE 325(65) MG
TABLET ORAL
Qty: 14 TABLET | OUTPATIENT
Start: 2023-09-01

## 2023-10-03 PROCEDURE — 93294 REM INTERROG EVL PM/LDLS PM: CPT | Performed by: INTERNAL MEDICINE

## 2023-10-03 PROCEDURE — 93296 REM INTERROG EVL PM/IDS: CPT | Performed by: INTERNAL MEDICINE

## 2023-10-04 ENCOUNTER — PROCEDURE VISIT (OUTPATIENT)
Dept: CARDIOLOGY CLINIC | Age: 73
End: 2023-10-04
Payer: MEDICARE

## 2023-10-04 DIAGNOSIS — Z95.0 PACEMAKER: Primary | ICD-10-CM

## 2023-10-04 NOTE — PROGRESS NOTES
[facility] Medtronic Dual Pacemaker   Patient of Gina([facility])/ [facility]    Battery 12 years    Presenting rhythm APVS    A Impedance 323  RV Impedance 380    P wave sensing 0.8  R wave sensing 15.1    A Threshold 0.625 @ 0.40  A Amplitude 1.50 @ 0.40  RV Thresholds 1.25 @ 0.40  RV Amplitude 2.75 @ 0.40      A Paced 13.7%  V Paced <0.1%    Programmed Mode AAIR <=> DDDR     Hx AF - taking xarelto  Afib [place] 1.5%    Episodes   8/2023 -AF

## 2023-10-25 RX ORDER — FERROUS SULFATE 325(65) MG
1 TABLET ORAL EVERY OTHER DAY
Qty: 14 TABLET | OUTPATIENT
Start: 2023-10-25

## 2023-12-15 RX ORDER — FERROUS SULFATE 325(65) MG
1 TABLET ORAL EVERY OTHER DAY
Qty: 14 TABLET | OUTPATIENT
Start: 2023-12-15

## 2024-01-11 ENCOUNTER — TELEPHONE (OUTPATIENT)
Dept: CARDIOLOGY CLINIC | Age: 74
End: 2024-01-11

## 2024-01-24 ENCOUNTER — TELEPHONE (OUTPATIENT)
Dept: CARDIOLOGY CLINIC | Age: 74
End: 2024-01-24

## 2024-01-24 NOTE — TELEPHONE ENCOUNTER
I CALLED THIS PT . [employer] SAID PT HAS BEEN DISCONNECTED FOR OVER 21 DAYS    SHE TOLD ME SHE SENT IT BACK TO THE COMPANY. SHE SIAD SHE HAD TOO MUCH EQUIPMENT LYING AROUND AND SHE DID NOT FEEL LIKE SHE NEEDED IT SO SHE SENT IT BACK TO [employer] AND SHE SAID SHE ONLY WANTS TO COME IN TWICE YEARLY    SHE HAS A PACER APPT SCHEDULED IN MARCH WHEN SHE SEES DR AMAYA

## 2024-03-20 ENCOUNTER — TELEPHONE (OUTPATIENT)
Dept: CARDIOLOGY CLINIC | Age: 74
End: 2024-03-20

## 2024-03-21 ENCOUNTER — TELEPHONE (OUTPATIENT)
Dept: CARDIOLOGY CLINIC | Age: 74
End: 2024-03-21